# Patient Record
Sex: MALE | Race: WHITE | NOT HISPANIC OR LATINO | Employment: OTHER | URBAN - METROPOLITAN AREA
[De-identification: names, ages, dates, MRNs, and addresses within clinical notes are randomized per-mention and may not be internally consistent; named-entity substitution may affect disease eponyms.]

---

## 2017-01-11 DIAGNOSIS — I10 ESSENTIAL HYPERTENSION WITH GOAL BLOOD PRESSURE LESS THAN 140/90: Primary | ICD-10-CM

## 2017-01-11 NOTE — TELEPHONE ENCOUNTER
amLODIPine (NORVASC) 10 MG tablet      Last Written Prescription Date: 09/02/16  Last Fill Quantity: 90, # refills: 0    Last Office Visit with [facility], [facility] or [facility] prescribing provider:  09/02/16   Future Office Visit:        BP Readings from Last 3 Encounters:   09/02/16 160/80   08/09/16 150/76   08/09/16 126/75           [facility]

## 2017-01-11 NOTE — TELEPHONE ENCOUNTER
Routing refill request to provider for review/approval because:  Last 2 BP reading above goal.    Vielka Mcguire RN

## 2017-01-13 RX ORDER — AMLODIPINE BESYLATE 10 MG/1
10 TABLET ORAL DAILY
Qty: 30 TABLET | Refills: 0 | Status: SHIPPED | OUTPATIENT
Start: 2017-01-13 | End: 2017-02-10

## 2017-02-10 ENCOUNTER — OFFICE VISIT (OUTPATIENT)
Dept: FAMILY MEDICINE | Facility: CLINIC | Age: 57
End: 2017-02-10
Payer: COMMERCIAL

## 2017-02-10 VITALS
HEIGHT: 71 IN | OXYGEN SATURATION: 99 % | SYSTOLIC BLOOD PRESSURE: 148 MMHG | BODY MASS INDEX: 29.4 KG/M2 | DIASTOLIC BLOOD PRESSURE: 80 MMHG | TEMPERATURE: 98.8 F | HEART RATE: 75 BPM | WEIGHT: 210 LBS

## 2017-02-10 DIAGNOSIS — E78.5 HYPERLIPIDEMIA LDL GOAL <130: ICD-10-CM

## 2017-02-10 DIAGNOSIS — I10 ESSENTIAL HYPERTENSION WITH GOAL BLOOD PRESSURE LESS THAN 140/90: Primary | ICD-10-CM

## 2017-02-10 PROCEDURE — 99214 OFFICE O/P EST MOD 30 MIN: CPT | Performed by: FAMILY MEDICINE

## 2017-02-10 RX ORDER — METOPROLOL SUCCINATE 50 MG/1
50 TABLET, EXTENDED RELEASE ORAL DAILY
Qty: 90 TABLET | Refills: 0 | Status: SHIPPED | OUTPATIENT
Start: 2017-02-10 | End: 2017-08-03 | Stop reason: ALTCHOICE

## 2017-02-10 RX ORDER — AMLODIPINE BESYLATE 5 MG/1
10 TABLET ORAL DAILY
Qty: 180 TABLET | Refills: 0 | Status: SHIPPED | OUTPATIENT
Start: 2017-02-10 | End: 2017-05-25

## 2017-02-10 RX ORDER — SIMVASTATIN 20 MG
20 TABLET ORAL EVERY EVENING
Qty: 90 TABLET | Refills: 0 | Status: SHIPPED | OUTPATIENT
Start: 2017-02-10 | End: 2017-05-25

## 2017-02-10 RX ORDER — CHLORTHALIDONE 25 MG/1
25 TABLET ORAL DAILY
Qty: 90 TABLET | Refills: 0 | Status: SHIPPED | OUTPATIENT
Start: 2017-02-10 | End: 2017-05-25

## 2017-02-10 ASSESSMENT — PAIN SCALES - GENERAL: PAINLEVEL: NO PAIN (0)

## 2017-02-10 NOTE — NURSING NOTE
"Chief Complaint   Patient presents with     Hypertension     Lipids       Initial /84 mmHg  Pulse 75  Temp(Src) 98.8  F (37.1  C) (Oral)  Ht 5' 11\" (1.803 m)  Wt 210 lb (95.255 kg)  BMI 29.30 kg/m2  SpO2 99% Estimated body mass index is 29.3 kg/(m^2) as calculated from the following:    Height as of this encounter: 5' 11\" (1.803 m).    Weight as of this encounter: 210 lb (95.255 kg).  Medication Reconciliation: complete   YUSUF Burt MA      "

## 2017-02-10 NOTE — PATIENT INSTRUCTIONS
================================================================================  Normal Values   Blood pressure  <140/90 for most adults    <130/80 for some chronic diseases (ask your care team about yours)    BMI (body mass index)  18.5-25 kg/m2 (based on height and weight)     Thank you for visiting [facility]    Normal or non-critical lab and imaging results will be communicated to you by MyChart, letter or phone within 7 days.  If you do not hear from us within 10 days, please call the clinic. If you have a critical or abnormal lab result, we will notify you by phone as soon as possible.     If you have any questions regarding your visit please contact:     Team Comfort:   Clinic Hours Telephone Number   Dr. Chacho Jones 7am-5pm  Monday - Friday [phone]  Azucena Vora RN   Pharmacy 8:00am-8pm Monday-Friday    9am-5pm Saturday-Sunday [phone]   Urgent Care 11am-9pm Monday-Friday        9am-5pm Saturday-Sunday [phone]     After hours, weekend or if you need to make an appointment with your primary provider please call [phone].   After Hours nurse advise: call [place] [facility]: [phone]    Medication Refills:  Call your pharmacy and they will forward the refill to us. Please allow 3 business days for your refills to be completed.

## 2017-02-10 NOTE — MR AVS SNAPSHOT
After Visit Summary   2/10/2017    Erik Youngblood    MRN: [id number]           Patient Information     Date Of Birth          1960        Visit Information        Provider Department      2/10/2017 10:00 AM Chacho Garcia MD [facility]        Today's Diagnoses     Essential hypertension with goal blood pressure less than 140/90    -  1     Hyperlipidemia LDL goal <130           Care Instructions        ================================================================================  Normal Values   Blood pressure  <140/90 for most adults    <130/80 for some chronic diseases (ask your care team about yours)    BMI (body mass index)  18.5-25 kg/m2 (based on height and weight)     Thank you for visiting [facility]    Normal or non-critical lab and imaging results will be communicated to you by MyChart, letter or phone within 7 days.  If you do not hear from us within 10 days, please call the clinic. If you have a critical or abnormal lab result, we will notify you by phone as soon as possible.     If you have any questions regarding your visit please contact:     Team Comfort:   Clinic Hours Telephone Number   Dr. Chacho Jones 7am-5pm  Monday - Friday [phone]  Azucena Vora RN   Pharmacy 8:00am-8pm Monday-Friday    9am-5pm Saturday-Sunday [phone]   Urgent Care 11am-9pm Monday-Friday        9am-5pm Saturday-Sunday [phone]     After hours, weekend or if you need to make an appointment with your primary provider please call [phone].   After Hours nurse advise: call [place] [facility]: [phone]    Medication Refills:  Call your pharmacy and they will forward the refill to us. Please allow 3 business days for your refills to be completed.                Follow-ups after your visit        Follow-up notes from your care team     Return in about 4 weeks  "(around 3/7/2017) for blood pressure check (and fasting labs in the near future).      Future tests that were ordered for you today     Open Future Orders        Priority Expected Expires Ordered    Lipid panel reflex to direct LDL Routine 2/11/2017 2/10/2018 2/10/2017    ALT Routine 2/11/2017 2/10/2018 2/10/2017    Potassium Routine 2/11/2017 2/10/2018 2/10/2017    Creatinine Routine 2/11/2017 2/10/2018 2/10/2017            Who to contact     If you have questions or need follow up information about today's clinic visit or your schedule please contact [facility] directly at [phone].  Normal or non-critical lab and imaging results will be communicated to you by Fingooroohart, letter or phone within 4 business days after the clinic has received the results. If you do not hear from us within 7 days, please contact the clinic through Optimal Internet Solutionst or phone. If you have a critical or abnormal lab result, we will notify you by phone as soon as possible.  Submit refill requests through Solairedirect or call your pharmacy and they will forward the refill request to us. Please allow 3 business days for your refill to be completed.          Additional Information About Your Visit        FingoorooharCITTIO Information     Solairedirect gives you secure access to your electronic health record. If you see a primary care provider, you can also send messages to your care team and make appointments. If you have questions, please call your primary care clinic.  If you do not have a primary care provider, please call [phone] and they will assist you.        Care EveryWhere ID     This is your Care EveryWhere ID. This could be used by other organizations to access your [place] medical records  [id number]        Your Vitals Were     Pulse Temperature Height BMI (Body Mass Index) Pulse Oximetry       75 98.8  F (37.1  C) (Oral) 5' 11\" (1.803 m) 29.30 kg/m2 99%        Blood Pressure from Last 3 Encounters:   02/10/17 148/80   09/02/16 " 160/80   08/09/16 150/76    Weight from Last 3 Encounters:   02/10/17 210 lb (95.255 kg)   09/02/16 216 lb 4.8 oz (98.113 kg)   08/09/16 212 lb (96.163 kg)                 Today's Medication Changes          These changes are accurate as of: 2/10/17 10:42 AM.  If you have any questions, ask your nurse or doctor.               Start taking these medicines.        Dose/Directions    metoprolol 50 MG 24 hr tablet   Commonly known as:  TOPROL-XL   Used for:  Essential hypertension with goal blood pressure less than 140/90   Started by:  Chacho Garcia MD        Dose:  50 mg   Take 1 tablet (50 mg) by mouth daily for blood pressure.   Quantity:  90 tablet   Refills:  0         These medicines have changed or have updated prescriptions.        Dose/Directions    amLODIPine 5 MG tablet   Commonly known as:  NORVASC   This may have changed:  medication strength   Used for:  Essential hypertension with goal blood pressure less than 140/90   Changed by:  Chacho Garcia MD        Dose:  10 mg   Take 2 tablets (10 mg) by mouth daily for blood pressure.   Quantity:  180 tablet   Refills:  0       chlorthalidone 25 MG tablet   Commonly known as:  HYGROTON   This may have changed:  See the new instructions.   Used for:  Essential hypertension with goal blood pressure less than 140/90   Changed by:  Chacho Garcia MD        Dose:  25 mg   Take 1 tablet (25 mg) by mouth daily for blood pressure.   Quantity:  90 tablet   Refills:  0       simvastatin 20 MG tablet   Commonly known as:  ZOCOR   This may have changed:  See the new instructions.   Used for:  Hyperlipidemia LDL goal <130   Changed by:  Chacho Garcia MD        Dose:  20 mg   Take 1 tablet (20 mg) by mouth every evening for cholesterol.   Quantity:  90 tablet   Refills:  0            Where to get your medicines      These medications were sent to [facility] [place] - [address]  [address], [place] [address]      Phone:  [phone]    - amLODIPine 5 MG tablet  - chlorthalidone 25 MG tablet  - metoprolol 50 MG 24 hr tablet  - simvastatin 20 MG tablet             Primary Care Provider Office Phone # Fax #    Chacho Garcia [phone]       [facility] [address]  [facility] [id number]        Thank you!     Thank you for choosing [facility]  for your care. Our goal is always to provide you with excellent care. Hearing back from our patients is one way we can continue to improve our services. Please take a few minutes to complete the written survey that you may receive in the mail after your visit with us. Thank you!             Your Updated Medication List - Protect others around you: Learn how to safely use, store and throw away your medicines at www.disposemymeds.org.          This list is accurate as of: 2/10/17 10:42 AM.  Always use your most recent med list.                   Brand Name Dispense Instructions for use    amLODIPine 5 MG tablet    NORVASC    180 tablet    Take 2 tablets (10 mg) by mouth daily for blood pressure.       chlorthalidone 25 MG tablet    HYGROTON    90 tablet    Take 1 tablet (25 mg) by mouth daily for blood pressure.       CITRUCEL Powd   Generic drug:  methylcellulose (laxative)      Take by mouth daily       metoprolol 50 MG 24 hr tablet    TOPROL-XL    90 tablet    Take 1 tablet (50 mg) by mouth daily for blood pressure.       simvastatin 20 MG tablet    ZOCOR    90 tablet    Take 1 tablet (20 mg) by mouth every evening for cholesterol.       VITAMIN D3 PO      Take 2,000 Units by mouth daily

## 2017-02-13 DIAGNOSIS — I10 ESSENTIAL HYPERTENSION WITH GOAL BLOOD PRESSURE LESS THAN 140/90: ICD-10-CM

## 2017-02-13 DIAGNOSIS — E78.5 HYPERLIPIDEMIA LDL GOAL <130: ICD-10-CM

## 2017-02-13 LAB
ALT SERPL W P-5'-P-CCNC: 58 U/L (ref 0–70)
CHOLEST SERPL-MCNC: 115 MG/DL
CREAT SERPL-MCNC: 1.24 MG/DL (ref 0.66–1.25)
GFR SERPL CREATININE-BSD FRML MDRD: 60 ML/MIN/1.7M2
HDLC SERPL-MCNC: 52 MG/DL
LDLC SERPL CALC-MCNC: 44 MG/DL
NONHDLC SERPL-MCNC: 63 MG/DL
POTASSIUM SERPL-SCNC: 3.2 MMOL/L (ref 3.4–5.3)
TRIGL SERPL-MCNC: 94 MG/DL

## 2017-02-13 PROCEDURE — 82565 ASSAY OF CREATININE: CPT | Performed by: FAMILY MEDICINE

## 2017-02-13 PROCEDURE — 36415 COLL VENOUS BLD VENIPUNCTURE: CPT | Performed by: FAMILY MEDICINE

## 2017-02-13 PROCEDURE — 84460 ALANINE AMINO (ALT) (SGPT): CPT | Performed by: FAMILY MEDICINE

## 2017-02-13 PROCEDURE — 84132 ASSAY OF SERUM POTASSIUM: CPT | Performed by: FAMILY MEDICINE

## 2017-02-13 PROCEDURE — 80061 LIPID PANEL: CPT | Performed by: FAMILY MEDICINE

## 2017-02-14 RX ORDER — AMLODIPINE BESYLATE 10 MG/1
TABLET ORAL
Qty: 30 TABLET | Refills: 0 | OUTPATIENT
Start: 2017-02-14

## 2017-02-14 NOTE — TELEPHONE ENCOUNTER
Medication is being denied due to: RX was sent to [facility] in [place] on 02/10/2017 for #180 with 0 refills.  No further refills should be needed at this time.   Charline Rodríguez RN

## 2017-05-17 ENCOUNTER — TRANSFERRED RECORDS (OUTPATIENT)
Dept: HEALTH INFORMATION MANAGEMENT | Facility: CLINIC | Age: 57
End: 2017-05-17

## 2017-05-25 DIAGNOSIS — I10 ESSENTIAL HYPERTENSION WITH GOAL BLOOD PRESSURE LESS THAN 140/90: ICD-10-CM

## 2017-05-25 DIAGNOSIS — E78.5 HYPERLIPIDEMIA LDL GOAL <130: ICD-10-CM

## 2017-05-25 NOTE — TELEPHONE ENCOUNTER
simvastatin (ZOCOR) 20 MG tablet     Last Written Prescription Date: 02/10/17  Last Fill Quantity: 90, # refills: 0  Last Office Visit with [facility] or [facility] prescribing provider: 02/10/17       Lab Results   Component Value Date    CHOL 115 02/13/2017     Lab Results   Component Value Date    HDL 52 02/13/2017     Lab Results   Component Value Date    LDL 44 02/13/2017     Lab Results   Component Value Date    TRIG 94 02/13/2017     No results found for: CHOLJHONLRATIO          amLODIPine (NORVASC) 5 MG tablet      Last Written Prescription Date: 02/10/17  Last Fill Quantity: 90, # refills: 0    Last Office Visit with [facility] or [facility] prescribing provider:  02/10/17   Future Office Visit:        BP Readings from Last 3 Encounters:   02/10/17 148/80   09/02/16 160/80   08/09/16 150/76       chlorthalidone (HYGROTON) 25 MG tablet      Last Written Prescription Date: 02/10/17  Last Fill Quantity: 90, # refills: 0  Last Office Visit with [facility] or [facility] prescribing provider: 02/10/17       Potassium   Date Value Ref Range Status   02/13/2017 3.2 (L) 3.4 - 5.3 mmol/L Final     Creatinine   Date Value Ref Range Status   02/13/2017 1.24 0.66 - 1.25 mg/dL Final     BP Readings from Last 3 Encounters:   02/10/17 148/80   09/02/16 160/80   08/09/16 150/76           Laura Hernandez  [facility]

## 2017-05-30 RX ORDER — SIMVASTATIN 20 MG
TABLET ORAL
Qty: 90 TABLET | Refills: 1 | Status: SHIPPED | OUTPATIENT
Start: 2017-05-30 | End: 2017-08-03

## 2017-05-30 RX ORDER — AMLODIPINE BESYLATE 5 MG/1
TABLET ORAL
Qty: 180 TABLET | Refills: 0 | Status: SHIPPED | OUTPATIENT
Start: 2017-05-30 | End: 2017-08-03

## 2017-05-30 NOTE — TELEPHONE ENCOUNTER
Simvastatin and Amlodipine - Prescription approved per [facility] Refill Protocol.    Chlorthalidone - Routing refill request to provider for review/approval because:  Labs out of range:  Potassium    Charline Rodríguez RN

## 2017-06-01 RX ORDER — CHLORTHALIDONE 25 MG/1
TABLET ORAL
Qty: 30 TABLET | Refills: 0 | Status: SHIPPED | OUTPATIENT
Start: 2017-06-01 | End: 2017-06-29

## 2017-06-01 NOTE — TELEPHONE ENCOUNTER
Called pt is notified of refill and lab appt is scheduled.  Julio C Rabago,  For [facility] Comfort [facility]

## 2017-06-08 DIAGNOSIS — I10 ESSENTIAL HYPERTENSION WITH GOAL BLOOD PRESSURE LESS THAN 140/90: ICD-10-CM

## 2017-06-08 LAB — POTASSIUM SERPL-SCNC: 3.1 MMOL/L (ref 3.4–5.3)

## 2017-06-08 PROCEDURE — 84132 ASSAY OF SERUM POTASSIUM: CPT | Performed by: FAMILY MEDICINE

## 2017-06-08 PROCEDURE — 36415 COLL VENOUS BLD VENIPUNCTURE: CPT | Performed by: FAMILY MEDICINE

## 2017-06-29 ENCOUNTER — TELEPHONE (OUTPATIENT)
Dept: FAMILY MEDICINE | Facility: CLINIC | Age: 57
End: 2017-06-29

## 2017-06-29 DIAGNOSIS — I10 ESSENTIAL HYPERTENSION WITH GOAL BLOOD PRESSURE LESS THAN 140/90: ICD-10-CM

## 2017-07-03 NOTE — TELEPHONE ENCOUNTER
Reason for Call:  Other prescription    Detailed comments: only 30 pills for Hygroton but 90 for the others - please call back maybe you dont want me to continue on them?    Phone Number Patient can be reached at: Home number on file [phone] (home)    Best Time: any    Can we leave a detailed message on this number? YES    Call taken on 7/3/2017 at 11:13 AM by Radha Quiroga

## 2017-07-06 RX ORDER — CHLORTHALIDONE 25 MG/1
TABLET ORAL
Qty: 90 TABLET | Refills: 0 | Status: SHIPPED | OUTPATIENT
Start: 2017-07-06 | End: 2017-08-03

## 2017-07-06 NOTE — TELEPHONE ENCOUNTER
Routing refill request to provider for review/approval because:  Potassium is abnormal; provider to advise. Patient would like 90 day.    Shakila Meredith RN, [facility] Triage

## 2017-07-06 NOTE — TELEPHONE ENCOUNTER
Pt is out of medication, is this being approved or denied?  Thank you very kindly!  [facility]aela [facility] [place]

## 2017-07-11 RX ORDER — POTASSIUM CHLORIDE 750 MG/1
10 TABLET, EXTENDED RELEASE ORAL DAILY
Qty: 90 TABLET | Refills: 1 | Status: SHIPPED | OUTPATIENT
Start: 2017-07-11 | End: 2017-12-21

## 2017-07-11 NOTE — TELEPHONE ENCOUNTER
Please advise the patient that I recommend he takes a potassium supplement. I sent a prescription for that. His potassium is probably low because of his chlorthalidone, which is otherwise a very good blood pressure medication. We can recheck the potassium level at his next hypertension visit, which needs to be scheduled within the next 2-4 weeks.

## 2017-08-03 ENCOUNTER — OFFICE VISIT (OUTPATIENT)
Dept: FAMILY MEDICINE | Facility: CLINIC | Age: 57
End: 2017-08-03
Payer: COMMERCIAL

## 2017-08-03 VITALS
BODY MASS INDEX: 29.01 KG/M2 | DIASTOLIC BLOOD PRESSURE: 82 MMHG | WEIGHT: 208 LBS | TEMPERATURE: 97.9 F | HEART RATE: 54 BPM | OXYGEN SATURATION: 96 % | SYSTOLIC BLOOD PRESSURE: 138 MMHG

## 2017-08-03 DIAGNOSIS — E78.5 HYPERLIPIDEMIA LDL GOAL <130: ICD-10-CM

## 2017-08-03 DIAGNOSIS — I10 ESSENTIAL HYPERTENSION WITH GOAL BLOOD PRESSURE LESS THAN 140/90: ICD-10-CM

## 2017-08-03 LAB
ALT SERPL W P-5'-P-CCNC: 50 U/L (ref 0–70)
ANION GAP SERPL CALCULATED.3IONS-SCNC: 8 MMOL/L (ref 3–14)
BUN SERPL-MCNC: 17 MG/DL (ref 7–30)
CALCIUM SERPL-MCNC: 9.4 MG/DL (ref 8.5–10.1)
CHLORIDE SERPL-SCNC: 98 MMOL/L (ref 94–109)
CHOLEST SERPL-MCNC: 125 MG/DL
CO2 SERPL-SCNC: 31 MMOL/L (ref 20–32)
CREAT SERPL-MCNC: 1.08 MG/DL (ref 0.66–1.25)
GFR SERPL CREATININE-BSD FRML MDRD: 70 ML/MIN/1.7M2
GLUCOSE SERPL-MCNC: 98 MG/DL (ref 70–99)
HDLC SERPL-MCNC: 58 MG/DL
LDLC SERPL CALC-MCNC: 48 MG/DL
NONHDLC SERPL-MCNC: 67 MG/DL
POTASSIUM SERPL-SCNC: 3.4 MMOL/L (ref 3.4–5.3)
SODIUM SERPL-SCNC: 137 MMOL/L (ref 133–144)
TRIGL SERPL-MCNC: 93 MG/DL

## 2017-08-03 PROCEDURE — 99214 OFFICE O/P EST MOD 30 MIN: CPT | Performed by: FAMILY MEDICINE

## 2017-08-03 PROCEDURE — 36415 COLL VENOUS BLD VENIPUNCTURE: CPT | Performed by: FAMILY MEDICINE

## 2017-08-03 PROCEDURE — 84460 ALANINE AMINO (ALT) (SGPT): CPT | Performed by: FAMILY MEDICINE

## 2017-08-03 PROCEDURE — 80061 LIPID PANEL: CPT | Performed by: FAMILY MEDICINE

## 2017-08-03 PROCEDURE — 80048 BASIC METABOLIC PNL TOTAL CA: CPT | Performed by: FAMILY MEDICINE

## 2017-08-03 RX ORDER — SIMVASTATIN 20 MG
TABLET ORAL
Qty: 90 TABLET | Refills: 1 | Status: SHIPPED | OUTPATIENT
Start: 2017-08-03 | End: 2018-03-22

## 2017-08-03 RX ORDER — AMLODIPINE BESYLATE 5 MG/1
10 TABLET ORAL DAILY
Qty: 180 TABLET | Refills: 1 | Status: SHIPPED | OUTPATIENT
Start: 2017-08-03 | End: 2018-03-08

## 2017-08-03 RX ORDER — CHLORTHALIDONE 25 MG/1
TABLET ORAL
Qty: 90 TABLET | Refills: 1 | Status: SHIPPED | OUTPATIENT
Start: 2017-08-03 | End: 2018-03-22

## 2017-08-03 NOTE — NURSING NOTE
"Chief Complaint   Patient presents with     Hypertension       Initial /82 (BP Location: Right arm, Patient Position: Chair, Cuff Size: Adult Large)  Pulse 54  Temp 97.9  F (36.6  C) (Oral)  Wt 208 lb (94.3 kg)  SpO2 96%  BMI 29.01 kg/m2 Estimated body mass index is 29.01 kg/(m^2) as calculated from the following:    Height as of 2/10/17: 5' 11\" (1.803 m).    Weight as of this encounter: 208 lb (94.3 kg).  Medication Reconciliation: complete   An,CMA (AMAA)      "

## 2017-08-03 NOTE — MR AVS SNAPSHOT
After Visit Summary   8/3/2017    Erik Youngblood    MRN: [id number]           Patient Information     Date Of Birth          1960        Visit Information        Provider Department      8/3/2017 9:40 AM Chacho Garcia MD [facility]        Today's Diagnoses     Essential hypertension with goal blood pressure less than 140/90        Hyperlipidemia LDL goal <130          Care Instructions    This summary includes the important diagnoses, test, medications and other important parts of your medical history.  Below are a few good we sites you can use to learn more about these.     Www.[facility] Center[facility].org : Up to date and easily searchable information on multiple topics.  Www.[facility] Center[facility].[email or web address]/[email or web address] : [facility] $4.99 medications  Www.medlineZe-gen.gov : medication info, interactive tutorials, watch real surgeries online  Www.familydoctor.org : good info from the Academy of Family Physicians  Www.Verified Person.Olaworks : good info from the [facility]  Www.cdc.gov : public health info, travel advisories, epidemics (H1N1)  Www.aap.org : children's health info, normal development, vaccinations  Www.health.state.[place].us : [place] dept of heatlh, public health issues in [place], N1N1    Based on your medical history and these are the current health maintenance or preventive care services that you are due for (some may have been done at this visit:)  Health Maintenance Due   Topic Date Due     EYE EXAM Q1 YEAR  01/11/2017     =================================================================================  Normal Values   Blood pressure  <140/90 for most adults    <130/80 for some chronic diseases (ask your care team about yours)    BMI (body mass index)  18.5-25 kg/m2 (based on height and weight)     Thank you for visiting [facility]    Normal or non-critical lab and imaging results will be communicated to you by MyChart, letter or phone within 7 days.  If you do not  hear from us within 10 days, please call the clinic. If you have a critical or abnormal lab result, we will notify you by phone as soon as possible.     If you have any questions regarding your visit please contact:     Team Comfort:   Clinic Hours Telephone Number   Dr. Chacho Jones   7am-5pm  Monday - Friday [phone]     Pharmacy 8:30am-9pm Monday-Friday    9am-5pm Saturday-Sunday [phone]   Urgent Care 11am-9pm Monday-Friday        9am-5pm Saturday-Sunday [phone]     After hours, weekend or if you need to make an appointment with your primary provider please call [phone].   After Hours nurse advise: call [place] [facility]: [phone]    Medication Refills:  Call your pharmacy and they will forward the refill to us. Please allow 3 business days for your refills to be completed.    Use Znaptag (secure email communication and access to your chart) to send your primary care provider a message or make an appointment. Ask someone on your Team how to sign up for Znaptag. To log on to [employer] or for more information in [place] please visit the website at [email or web address].  As of October 8, 2013, all password changes, disabled accounts, or ID changes in Znaptag/MyHealth will be done by our Access Services Department.   If you need help with your account or password, call: [phone]. Clinic staff no longer has the ability to change passwords.             Follow-ups after your visit        Follow-up notes from your care team     Return in about 6 months (around 1/20/2018) for full physical, blood pressure check, lab tests.      Who to contact     If you have questions or need follow up information about today's clinic visit or your schedule please contact [facility] directly at [phone].  Normal or non-critical lab and imaging results will be communicated to you by PredictAdhart, letter or phone  within 4 business days after the clinic has received the results. If you do not hear from us within 7 days, please contact the clinic through VasoNova or phone. If you have a critical or abnormal lab result, we will notify you by phone as soon as possible.  Submit refill requests through VasoNova or call your pharmacy and they will forward the refill request to us. Please allow 3 business days for your refill to be completed.          Additional Information About Your Visit        VasoNova Information     VasoNova gives you secure access to your electronic health record. If you see a primary care provider, you can also send messages to your care team and make appointments. If you have questions, please call your primary care clinic.  If you do not have a primary care provider, please call [phone] and they will assist you.        Care EveryWhere ID     This is your Care EveryWhere ID. This could be used by other organizations to access your [place] medical records  [id number]        Your Vitals Were     Pulse Temperature Pulse Oximetry BMI (Body Mass Index)          54 97.9  F (36.6  C) (Oral) 96% 29.01 kg/m2         Blood Pressure from Last 3 Encounters:   08/03/17 138/82   02/10/17 148/80   09/02/16 160/80    Weight from Last 3 Encounters:   08/03/17 208 lb (94.3 kg)   02/10/17 210 lb (95.3 kg)   09/02/16 216 lb 4.8 oz (98.1 kg)              We Performed the Following     ALT     Basic metabolic panel     Lipid panel reflex to direct LDL          Today's Medication Changes          These changes are accurate as of: 8/3/17 10:30 AM.  If you have any questions, ask your nurse or doctor.               These medicines have changed or have updated prescriptions.        Dose/Directions    amLODIPine 5 MG tablet   Commonly known as:  NORVASC   This may have changed:  See the new instructions.   Used for:  Essential hypertension with goal blood pressure less than 140/90   Changed by:  Chacho Garcia MD        Dose:   10 mg   Take 2 tablets (10 mg) by mouth daily for blood pressure.   Quantity:  180 tablet   Refills:  1       chlorthalidone 25 MG tablet   Commonly known as:  HYGROTON   This may have changed:  See the new instructions.   Used for:  Essential hypertension with goal blood pressure less than 140/90   Changed by:  Chacoh Garcia MD        TAKE ONE TABLET BY MOUTH EVERY DAY FOR BLOOD PRESSURE   Quantity:  90 tablet   Refills:  1       simvastatin 20 MG tablet   Commonly known as:  ZOCOR   This may have changed:  See the new instructions.   Used for:  Hyperlipidemia LDL goal <130   Changed by:  Chacho Garcia MD        TAKE ONE TABLET BY MOUTH EVERY DAY IN THE EVENING FOR CHOLESTEROL   Quantity:  90 tablet   Refills:  1         Stop taking these medicines if you haven't already. Please contact your care team if you have questions.     metoprolol 50 MG 24 hr tablet   Commonly known as:  TOPROL-XL   Stopped by:  Chacho Garcia MD                Where to get your medicines      These medications were sent to [facility] - [place], [place] - [address]  [address], [facility] [address]     Phone:  [phone]     amLODIPine 5 MG tablet    chlorthalidone 25 MG tablet    simvastatin 20 MG tablet                Primary Care Provider Office Phone # Fax #    Chacho Garcia [phone]       [facility] [address]  [facility] [address]        Equal Access to Services     [facility]: Hadii aad ku hadasho Soomaali, waaxda luqadaha, qaybta kaalmada adeegyada, waxay idiin hayaan chana kharash huber . So [facility] [phone].    ATENCIÓN: Si habla español, tiene a morris disposición servicios gratuitos de asistencia lingüística. Llame al [phone].    We comply with applicable federal civil rights laws and [place] laws. We do not discriminate on the basis of race, color, national origin, age, disability sex, sexual orientation or gender  identity.            Thank you!     Thank you for choosing [facility]  for your care. Our goal is always to provide you with excellent care. Hearing back from our patients is one way we can continue to improve our services. Please take a few minutes to complete the written survey that you may receive in the mail after your visit with us. Thank you!             Your Updated Medication List - Protect others around you: Learn how to safely use, store and throw away your medicines at www.disposemymeds.org.          This list is accurate as of: 8/3/17 10:30 AM.  Always use your most recent med list.                   Brand Name Dispense Instructions for use Diagnosis    amLODIPine 5 MG tablet    NORVASC    180 tablet    Take 2 tablets (10 mg) by mouth daily for blood pressure.    Essential hypertension with goal blood pressure less than 140/90       chlorthalidone 25 MG tablet    HYGROTON    90 tablet    TAKE ONE TABLET BY MOUTH EVERY DAY FOR BLOOD PRESSURE    Essential hypertension with goal blood pressure less than 140/90       CITRUCEL Powd   Generic drug:  methylcellulose (laxative)      Take by mouth daily        potassium chloride SA 10 MEQ CR tablet    K-DUR/KLOR-CON M    90 tablet    Take 1 tablet (10 mEq) by mouth daily    Essential hypertension with goal blood pressure less than 140/90       simvastatin 20 MG tablet    ZOCOR    90 tablet    TAKE ONE TABLET BY MOUTH EVERY DAY IN THE EVENING FOR CHOLESTEROL    Hyperlipidemia LDL goal <130       VITAMIN D3 PO      Take 2,000 Units by mouth daily

## 2017-08-03 NOTE — PROGRESS NOTES
SUBJECTIVE:                                                    Erik Youngblood is a 57 year old male who presents to clinic today for the following health issues:      Hypertension Follow-up      Outpatient blood pressures are being checked at home.  Results are 118/78.    Low Salt Diet: low salt      Amount of exercise or physical activity: 4-5 days/week for an average of 30-45 minutes    Problems taking medications regularly: No    Medication side effects: none  Diet: regular (no restrictions)    Past medical, family, and social histories, medications, and allergies are reviewed and updated in Epic.     ROS:  C: NEGATIVE for fever, chills, change in weight  E/M: NEGATIVE for ear, mouth and throat problems  R: NEGATIVE for significant cough or SOB  CV: NEGATIVE for chest pain, palpitations or peripheral edema  ROS otherwise negative    Any history above obtained by the Medical Assistant was reviewed by Dr. Chacho Garcia MD, and edited when necessary.    This document serves as a record of the services and decisions personally performed and made by Dr. Garcia. It was created on his behalf by Lyudmila Berrios, a trained medical scribe. The creation of this document is based the provider's statements to the medical scribe.  Lyudmila Berrios August 3, 2017 10:22 AM     OBJECTIVE:                                                    /82 (BP Location: Right arm, Patient Position: Chair, Cuff Size: Adult Large)  Pulse 54  Temp 97.9  F (36.6  C) (Oral)  Wt 94.3 kg (208 lb)  SpO2 96%  BMI 29.01 kg/m2   Body mass index is 29.01 kg/(m^2).     GENERAL: healthy, alert and no distress  EYES: Eyes grossly normal to inspection, PERRL, EOMI, sclerae white and conjunctivae normal  Heart & CV:  RRR no murmur.  Intact distal pulses, good cap refill.  LUNGS:  CTA B/L, no wheezing or crackles.   MS: no gross musculoskeletal defects noted, no edema  SKIN: no suspicious lesions or rashes  NEURO: Normal strength and tone,  sensory exam grossly normal, mentation intact, oriented times 3 and cranial nerves 2-12 intact  PSYCH: mentation appears normal, affect normal/bright     Diagnostic Test Results:  none      ASSESSMENT/PLAN:                                                      (I10) Essential hypertension with goal blood pressure less than 140/90  Comment: Well controlled.  Plan: amLODIPine (NORVASC) 5 MG tablet,         chlorthalidone (HYGROTON) 25 MG tablet, Basic         metabolic panel        Follow up in 6 months at [facility].    (E78.5) Hyperlipidemia LDL goal <130  Comment: Fasting. Historically at goal.  Plan: simvastatin (ZOCOR) 20 MG tablet, Lipid panel         reflex to direct LDL, ALT        Follow up in 6 months at [facility].      The information in this document, created by the medical scribe for me, accurately reflects the services I personally performed and the decisions made by me. I have reviewed and approved this document for accuracy prior to leaving the patient care area. August 3, 2017 10:22 AM   Chacho Garcia MD

## 2017-08-03 NOTE — PATIENT INSTRUCTIONS
This summary includes the important diagnoses, test, medications and other important parts of your medical history.  Below are a few good we sites you can use to learn more about these.     Www.iHigh.org : Up to date and easily searchable information on multiple topics.  Www.iHigh.org/Pharmacy/c_[email or web address].asp : [facility] $4.99 medications  Www.medlineplus.gov : medication info, interactive tutorials, watch real surgeries online  Www.familydoctor.org : good info from the Academy of Family Physicians  Www.mayoDailyLookinic.com : good info from the [facility]  Www.cdc.gov : public health info, travel advisories, epidemics (H1N1)  Www.aap.org : children's health info, normal development, vaccinations  Www.health.[facility].[place].us : [place] dept of heat, public health issues in [place], N1N1    Based on your medical history and these are the current health maintenance or preventive care services that you are due for (some may have been done at this visit:)  Health Maintenance Due   Topic Date Due     EYE EXAM Q1 YEAR  01/11/2017     =================================================================================  Normal Values   Blood pressure  <140/90 for most adults    <130/80 for some chronic diseases (ask your care team about yours)    BMI (body mass index)  18.5-25 kg/m2 (based on height and weight)     Thank you for visiting [facility]    Normal or non-critical lab and imaging results will be communicated to you by MyChart, letter or phone within 7 days.  If you do not hear from us within 10 days, please call the clinic. If you have a critical or abnormal lab result, we will notify you by phone as soon as possible.     If you have any questions regarding your visit please contact:     Team Comfort:   Clinic Hours Telephone Number   Dr. Chacho Jones   7am-5pm  Monday - Friday [phone]     Pharmacy 8:30am-9pm Monday-Friday     9am-5pm Saturday-Sunday [phone]   Urgent Care 11am-9pm Monday-Friday        9am-5pm Saturday-Sunday [phone]     After hours, weekend or if you need to make an appointment with your primary provider please call [phone].   After Hours nurse advise: call [facility]: [phone]    Medication Refills:  Call your pharmacy and they will forward the refill to us. Please allow 3 business days for your refills to be completed.    Use [employer] (secure email communication and access to your chart) to send your primary care provider a message or make an appointment. Ask someone on your Team how to sign up for [employer]. To log on to [facility] or for more information in [employer] please visit the website at [email or web address].  As of October 8, 2013, all password changes, disabled accounts, or ID changes in [employer]/MyHealth will be done by our Access Services Department.   If you need help with your account or password, call: [phone]. Clinic staff no longer has the ability to change passwords.

## 2017-12-21 DIAGNOSIS — I10 ESSENTIAL HYPERTENSION WITH GOAL BLOOD PRESSURE LESS THAN 140/90: ICD-10-CM

## 2017-12-21 NOTE — TELEPHONE ENCOUNTER
Requested Prescriptions   Pending Prescriptions Disp Refills     potassium chloride (K-TAB,KLOR-CON) 10 MEQ tablet [Pharmacy Med Name: POTASSIUM CHLORIDE ER 10MEQ TBCR]  Last Written Prescription Date:  07/11/17  Last Fill Quantity: 90,  # refills: 1   Last Office Visit with [facility], P or [facility] prescribing provider:  08/03/17   Future Office Visit:    90 tablet 1     Sig: TAKE ONE TABLET BY MOUTH EVERY DAY    Potassium Supplements Protocol Passed    12/21/2017  8:09 AM       Passed - Recent or future visit with authorizing provider's specialty    Patient had office visit in the last year or has a visit in the next 30 days with authorizing provider.  See chart review.              Passed - Patient is age 18 or older       Passed - Normal serum potassium in past 12 months    Recent Labs   Lab Test  08/03/17   1040   POTASSIUM  3.4

## 2017-12-26 RX ORDER — POTASSIUM CHLORIDE 750 MG/1
TABLET, EXTENDED RELEASE ORAL
Qty: 90 TABLET | Refills: 0 | Status: SHIPPED | OUTPATIENT
Start: 2017-12-26 | End: 2018-03-22

## 2017-12-27 NOTE — TELEPHONE ENCOUNTER
Prescription approved per [facility] Refill Protocol.  Maggy Dalton RN

## 2018-03-08 DIAGNOSIS — I10 ESSENTIAL HYPERTENSION WITH GOAL BLOOD PRESSURE LESS THAN 140/90: ICD-10-CM

## 2018-03-08 NOTE — TELEPHONE ENCOUNTER
"Requested Prescriptions   Pending Prescriptions Disp Refills     amLODIPine (NORVASC) 5 MG tablet [Pharmacy Med Name: AMLODIPINE BESYLATE 5MG TABS]  Last Written Prescription Date:  08/03/17  Last Fill Quantity: 180,  # refills: 1   Last Office Visit with [facility], [facility] or [facility] prescribing provider:  08/03/17   Future Office Visit:    Next 5 appointments (look out 90 days)     Mar 22, 2018  9:40 AM CDT   Office Visit with Chacho Garcia MD   [facility]    [address] [place] [id number]   [phone]                180 tablet 1     Sig: TAKE TWO TABLETS BY MOUTH EVERY DAY FOR BLOOD PRESSURE    Calcium Channel Blockers Protocol  Passed    3/8/2018 11:08 AM       Passed - Blood pressure under 140/90 in past 12 months    BP Readings from Last 3 Encounters:   08/03/17 138/82   02/10/17 148/80   09/02/16 160/80                Passed - Recent (12 mo) or future (30 days) visit within the authorizing provider's specialty    Patient had office visit in the last year or has a visit in the next 30 days with authorizing provider.  See \"Patient Info\" tab in inbasket, or \"Choose Columns\" in Meds & Orders section of the refill encounter.            Passed - Patient is age 18 or older       Passed - Normal serum creatinine on file in past 12 months    Recent Labs   Lab Test  08/03/17   1040   CR  1.08               "

## 2018-03-09 RX ORDER — AMLODIPINE BESYLATE 5 MG/1
TABLET ORAL
Qty: 60 TABLET | Refills: 0 | Status: SHIPPED | OUTPATIENT
Start: 2018-03-09 | End: 2018-03-22

## 2018-03-09 NOTE — TELEPHONE ENCOUNTER
"Requested Prescriptions   Pending Prescriptions Disp Refills     amLODIPine (NORVASC) 5 MG tablet [Pharmacy Med Name: AMLODIPINE BESYLATE 5MG TABS] 60 tablet 0     Sig: TAKE TWO TABLETS BY MOUTH EVERY DAY FOR BLOOD PRESSURE    Calcium Channel Blockers Protocol  Passed    3/9/2018 12:02 PM       Passed - Blood pressure under 140/90 in past 12 months    BP Readings from Last 3 Encounters:   08/03/17 138/82   02/10/17 148/80   09/02/16 160/80                Passed - Recent (12 mo) or future (30 days) visit within the authorizing provider's specialty    Patient had office visit in the last year or has a visit in the next 30 days with authorizing provider.  See \"Patient Info\" tab in inbasket, or \"Choose Columns\" in Meds & Orders section of the refill encounter.            Passed - Patient is age 18 or older       Passed - Normal serum creatinine on file in past 12 months    Recent Labs   Lab Test  08/03/17   1040   CR  1.08             Medication is being filled for 1 time refill only due to:  Patient needs to be seen because due for yearly physical. Scheduled..     Next 5 appointments (look out 90 days)     Mar 22, 2018  9:40 AM CDT   [facility] MD   [facility]    [address] [place] [id number] [phone]                  Triny Nye RN, BSN      "

## 2018-03-22 ENCOUNTER — OFFICE VISIT (OUTPATIENT)
Dept: FAMILY MEDICINE | Facility: CLINIC | Age: 58
End: 2018-03-22
Payer: COMMERCIAL

## 2018-03-22 VITALS
TEMPERATURE: 97.6 F | SYSTOLIC BLOOD PRESSURE: 138 MMHG | HEIGHT: 71 IN | WEIGHT: 206 LBS | BODY MASS INDEX: 28.84 KG/M2 | HEART RATE: 62 BPM | OXYGEN SATURATION: 99 % | DIASTOLIC BLOOD PRESSURE: 78 MMHG

## 2018-03-22 DIAGNOSIS — I10 ESSENTIAL HYPERTENSION WITH GOAL BLOOD PRESSURE LESS THAN 140/90: Primary | ICD-10-CM

## 2018-03-22 DIAGNOSIS — E78.5 HYPERLIPIDEMIA LDL GOAL <130: ICD-10-CM

## 2018-03-22 LAB
ALT SERPL W P-5'-P-CCNC: 51 U/L (ref 0–70)
CHOLEST SERPL-MCNC: 134 MG/DL
CREAT SERPL-MCNC: 1.09 MG/DL (ref 0.66–1.25)
GFR SERPL CREATININE-BSD FRML MDRD: 69 ML/MIN/1.7M2
HDLC SERPL-MCNC: 58 MG/DL
LDLC SERPL CALC-MCNC: 57 MG/DL
NONHDLC SERPL-MCNC: 76 MG/DL
POTASSIUM SERPL-SCNC: 3 MMOL/L (ref 3.4–5.3)
TRIGL SERPL-MCNC: 96 MG/DL

## 2018-03-22 PROCEDURE — 36415 COLL VENOUS BLD VENIPUNCTURE: CPT | Performed by: FAMILY MEDICINE

## 2018-03-22 PROCEDURE — 99214 OFFICE O/P EST MOD 30 MIN: CPT | Performed by: FAMILY MEDICINE

## 2018-03-22 PROCEDURE — 84132 ASSAY OF SERUM POTASSIUM: CPT | Performed by: FAMILY MEDICINE

## 2018-03-22 PROCEDURE — 82565 ASSAY OF CREATININE: CPT | Performed by: FAMILY MEDICINE

## 2018-03-22 PROCEDURE — 84460 ALANINE AMINO (ALT) (SGPT): CPT | Performed by: FAMILY MEDICINE

## 2018-03-22 PROCEDURE — 80061 LIPID PANEL: CPT | Performed by: FAMILY MEDICINE

## 2018-03-22 RX ORDER — CHLORTHALIDONE 25 MG/1
25 TABLET ORAL DAILY
Qty: 90 TABLET | Refills: 1 | Status: SHIPPED | OUTPATIENT
Start: 2018-03-22 | End: 2018-04-12 | Stop reason: ALTCHOICE

## 2018-03-22 RX ORDER — SIMVASTATIN 20 MG
20 TABLET ORAL EVERY EVENING
Qty: 90 TABLET | Refills: 1 | Status: SHIPPED | OUTPATIENT
Start: 2018-03-22 | End: 2018-10-18

## 2018-03-22 RX ORDER — AMLODIPINE BESYLATE 5 MG/1
10 TABLET ORAL DAILY
Qty: 180 TABLET | Refills: 1 | Status: SHIPPED | OUTPATIENT
Start: 2018-03-22 | End: 2018-09-29

## 2018-03-22 RX ORDER — POTASSIUM CHLORIDE 750 MG/1
10 TABLET, EXTENDED RELEASE ORAL DAILY
Qty: 90 TABLET | Refills: 1 | Status: SHIPPED | OUTPATIENT
Start: 2018-03-22 | End: 2018-06-29

## 2018-03-22 ASSESSMENT — PAIN SCALES - GENERAL: PAINLEVEL: NO PAIN (0)

## 2018-03-22 NOTE — PATIENT INSTRUCTIONS
At [facility], we strive to deliver an exceptional experience to you, every time we see you.  If you receive a survey in the mail, please send us back your thoughts. We really do value your feedback.    Based on your medical history, these are the current health maintenance/preventive care services that you are due for (some may have been done at this visit.)  Health Maintenance Due   Topic Date Due     EYE EXAM Q1 YEAR  01/11/2017         Suggested websites for health information:  Www.[facility].org : Up to date and easily searchable information on multiple topics.  Www.medlineplus.gov : medication info, interactive tutorials, watch real surgeries online  Www.familydoctor.org : good info from the Academy of Family Physicians  Www.cdc.gov : public health info, travel advisories, epidemics (H1N1)  Www.aap.org : children's health info, normal development, vaccinations  Www.health.[facility].[place].us : [place] dept of health, public health issues in [place], N1N1    Your care team:                            Family Medicine Internal Medicine   MD Waylon Reyes MD Shantel Branch-Fleming, MD Katya Georgiev PA-C Megan Hill, APRN CNP    Dimitrios Andino MD Pediatrics   Austin Stephens, PAAsaelC  Jesi Newby, CNP Mirela Zamudio APRN CNP   MD Amanda Baron MD Deborah Mielke, MD Kim Thein, APRN CNP      Clinic hours: Monday - Thursday 7 am-7 pm; Fridays 7 am-5 pm.   Urgent care: Monday - Friday 11 am-9 pm; Saturday and Sunday 9 am-5 pm.  Pharmacy : Monday -Thursday 8 am-8 pm; Friday 8 am-6 pm; Saturday and Sunday 9 am-5 pm.     Clinic: [phone]   Pharmacy: [phone]

## 2018-03-22 NOTE — MR AVS SNAPSHOT
After Visit Summary   3/22/2018    Erik Youngblood    MRN: [id number]           Patient Information     Date Of Birth          1960        Visit Information        Provider Department      3/22/2018 1:40 PM Chacho Garcia MD [facility]        Today's Diagnoses     Essential hypertension with goal blood pressure less than 140/90    -  1    Hyperlipidemia LDL goal <130          Care Instructions    At [facility], we strive to deliver an exceptional experience to you, every time we see you.  If you receive a survey in the mail, please send us back your thoughts. We really do value your feedback.    Based on your medical history, these are the current health maintenance/preventive care services that you are due for (some may have been done at this visit.)  Health Maintenance Due   Topic Date Due     EYE EXAM Q1 YEAR  01/11/2017         Suggested websites for health information:  WwwStepOne : Up to date and easily searchable information on multiple topics.  Www.[employer].gov : medication info, interactive tutorials, watch real surgeries online  Www.familydoctor.org : good info from the Academy of Family Physicians  Www.cdc.gov : public health info, travel advisories, epidemics (H1N1)  Www.aap.org : children's health info, normal development, vaccinations  Www.health.[facility].[place].us : [place] dept of health, public health issues in [place], N1N1    Your care team:                            Family Medicine Internal Medicine   MD Waylon Reyes MD Shantel Branch-Fleming, MD Katya Georgiev PA-C Megan Hill, IRMA Andino MD Pediatrics   COLIN Vu, NGUYEN Zamudio APRN CNP   MD Amanda Baron MD Deborah Mielke, MD Kim Thein, APRN CNP      Clinic hours: Monday - Thursday 7 am-7 pm; Fridays 7 am-5 pm.   Urgent care: Monday - Friday 11 am-9 pm; Saturday and Sunday 9 am-5 pm.  Pharmacy : Monday  "-Thursday 8 am-8 pm; Friday 8 am-6 pm; Saturday and Sunday 9 am-5 pm.     Clinic: [phone]   Pharmacy: [phone]              Follow-ups after your visit        Follow-up notes from your care team     Return in about 6 months (around 9/22/2018) for full physical, cholesterol, blood pressure check.      Who to contact     If you have questions or need follow up information about today's clinic visit or your schedule please contact [facility] directly at [phone].  Normal or non-critical lab and imaging results will be communicated to you by [employer], letter or phone within 4 business days after the clinic has received the results. If you do not hear from us within 7 days, please contact the clinic through [employer] or phone. If you have a critical or abnormal lab result, we will notify you by phone as soon as possible.  Submit refill requests through [employer] or call your pharmacy and they will forward the refill request to us. Please allow 3 business days for your refill to be completed.          Additional Information About Your Visit        MyChart Information     [employer] gives you secure access to your electronic health record. If you see a primary care provider, you can also send messages to your care team and make appointments. If you have questions, please call your primary care clinic.  If you do not have a primary care provider, please call [phone] and they will assist you.        Care EveryWhere ID     This is your Care EveryWhere ID. This could be used by other organizations to access your [facility] medical records  [id number]        Your Vitals Were     Pulse Temperature Height Pulse Oximetry BMI (Body Mass Index)       62 97.6  F (36.4  C) (Oral) 5' 11\" (1.803 m) 99% 28.73 kg/m2        Blood Pressure from Last 3 Encounters:   03/22/18 138/78   08/03/17 138/82   02/10/17 148/80    Weight from Last 3 Encounters:   03/22/18 206 lb (93.4 kg)   08/03/17 208 lb (94.3 kg) "   02/10/17 210 lb (95.3 kg)              We Performed the Following     ALT     Creatinine     Lipid panel reflex to direct LDL Non-fasting     Potassium          Today's Medication Changes          These changes are accurate as of 3/22/18  2:15 PM.  If you have any questions, ask your nurse or doctor.               These medicines have changed or have updated prescriptions.        Dose/Directions    amLODIPine 5 MG tablet   Commonly known as:  NORVASC   This may have changed:  See the new instructions.   Used for:  Essential hypertension with goal blood pressure less than 140/90   Changed by:  Chacho Garcia MD        Dose:  10 mg   Take 2 tablets (10 mg) by mouth daily for blood pressure.   Quantity:  180 tablet   Refills:  1       chlorthalidone 25 MG tablet   Commonly known as:  HYGROTON   This may have changed:    - how much to take  - how to take this  - when to take this  - additional instructions   Used for:  Essential hypertension with goal blood pressure less than 140/90   Changed by:  Chacho Garcia MD        Dose:  25 mg   Take 1 tablet (25 mg) by mouth daily for blood pressure.   Quantity:  90 tablet   Refills:  1       potassium chloride 10 MEQ tablet   Commonly known as:  K-TAB,KLOR-CON   This may have changed:  See the new instructions.   Used for:  Essential hypertension with goal blood pressure less than 140/90   Changed by:  Chacho Garcia MD        Dose:  10 mEq   Take 1 tablet (10 mEq) by mouth daily   Quantity:  90 tablet   Refills:  1       simvastatin 20 MG tablet   Commonly known as:  ZOCOR   This may have changed:    - how much to take  - how to take this  - when to take this  - additional instructions   Used for:  Hyperlipidemia LDL goal <130   Changed by:  Chacho Garcia MD        Dose:  20 mg   Take 1 tablet (20 mg) by mouth every evening for cholesterol.   Quantity:  90 tablet   Refills:  1            Where to get your medicines      [place]  [facility] [place] [address]  [address], [facility] [address]     Phone:  [phone]     amLODIPine 5 MG tablet    chlorthalidone 25 MG tablet    potassium chloride 10 MEQ tablet    simvastatin 20 MG tablet                Primary Care Provider Office Phone # Fax #    Chacho Garcia [phone]       [address]  [facility] [address]        Equal Access to Services     ASIYA SPENCE : Hadii aad ku hadasho Soomaali, waaxda luqadaha, qaybta kaalmada adeegyada, waxay idiin hayaan adeeg kharash la'michael . So [facility] [phone].    ATENCIÓN: Si habla español, tiene a morris disposición servicios gratuitos de asistencia lingüística. [facility] [phone].    We comply with applicable federal civil rights laws and [place] laws. We do not discriminate on the basis of race, color, national origin, age, disability, sex, sexual orientation, or gender identity.            Thank you!     Thank you for choosing [facility]  for your care. Our goal is always to provide you with excellent care. Hearing back from our patients is one way we can continue to improve our services. Please take a few minutes to complete the written survey that you may receive in the mail after your visit with us. Thank you!             Your Updated Medication List - Protect others around you: Learn how to safely use, store and throw away your medicines at www.disposemymeds.org.          This list is accurate as of 3/22/18  2:15 PM.  Always use your most recent med list.                   Brand Name Dispense Instructions for use Diagnosis    amLODIPine 5 MG tablet    NORVASC    180 tablet    Take 2 tablets (10 mg) by mouth daily for blood pressure.    Essential hypertension with goal blood pressure less than 140/90       chlorthalidone 25 MG tablet    HYGROTON    90 tablet    Take 1 tablet (25 mg) by mouth daily for blood pressure.    Essential hypertension with  goal blood pressure less than 140/90       CITRUCEL Powd   Generic drug:  methylcellulose (laxative)      Take by mouth daily        potassium chloride 10 MEQ tablet    K-TAB,KLOR-CON    90 tablet    Take 1 tablet (10 mEq) by mouth daily    Essential hypertension with goal blood pressure less than 140/90       simvastatin 20 MG tablet    ZOCOR    90 tablet    Take 1 tablet (20 mg) by mouth every evening for cholesterol.    Hyperlipidemia LDL goal <130       VITAMIN D3 PO      Take 2,000 Units by mouth daily

## 2018-03-22 NOTE — PROGRESS NOTES
"  SUBJECTIVE:   Erik Youngblood is a 57 year old male who presents to clinic today for the following health issues:      Hyperlipidemia Follow-Up      Rate your low fat/cholesterol diet?: poor, went on vacation recently     Taking statin?  Yes, no muscle aches from statin    Other lipid medications/supplements?:  none    Hypertension Follow-up      Outpatient blood pressures are being checked at home.  Results are very good: yesterday morning - 118/73, yesterday afternoon - 118/72; this morning - 122/82    Low Salt Diet: no added salt    He suspects he has a component of white coat hypertension.      Amount of exercise or physical activity: 4-5 days/week for an average of 30-45 minutes    Problems taking medications regularly: No    Medication side effects: none    Diet: regular (no restrictions)      Past medical, family, and social histories, medications, and allergies are reviewed and updated in Epic.     ROS:  CONSTITUTIONAL: NEGATIVE for fever, chills, change in weight  ENT/MOUTH: He notes his left ear has been bothersome since he flew home from [place].  RESP: NEGATIVE for significant cough or SOB  CV: NEGATIVE for chest pain, palpitations or peripheral edema  ROS otherwise negative    This document serves as a record of the services and decisions personally performed and made by Dr. Garcia. It was created on his behalf by Lyudmila Berrios, a trained medical scribe. The creation of this document is based the provider's statements to the medical scribe.  Lyudmila Berrios March 22, 2018 2:09 PM     OBJECTIVE:                                                    /78  Pulse 62  Temp 97.6  F (36.4  C) (Oral)  Ht 1.803 m (5' 11\")  Wt 93.4 kg (206 lb)  SpO2 99%  BMI 28.73 kg/m2   Body mass index is 28.73 kg/(m^2).     GENERAL: healthy, alert and no distress  EYES: Eyes grossly normal to inspection, PERRL, EOMI, sclerae white and conjunctivae normal  HENT: bilateral ear canals and TMs normal  RESP: lungs " clear to auscultation - no crackles or wheezes, no areas of dullness, no tachypnea  CV: Heart regular rate and rhythm without murmur, click or rub. No peripheral edema and peripheral pulses strong  MS: no gross musculoskeletal defects noted, no edema  SKIN: no suspicious lesions or rashes  NEURO: Normal strength and tone, sensory exam grossly normal, mentation intact, oriented times 3 and cranial nerves 2-12 intact  PSYCH: mentation appears normal, affect normal/bright     Diagnostic Test Results:  none      ASSESSMENT/PLAN:                                                      (I10) Essential hypertension with goal blood pressure less than 140/90  (primary encounter diagnosis)  Comment: Well controlled.  Plan: amLODIPine (NORVASC) 5 MG tablet,         chlorthalidone (HYGROTON) 25 MG tablet,         potassium chloride (K-TAB,KLOR-CON) 10 MEQ         tablet, Potassium, Creatinine        Return in about 6 months (around 9/22/2018) for full physical, cholesterol, blood pressure check.     (E78.5) Hyperlipidemia LDL goal <130  Comment: historically at goal. Non-fasting (last ate 6:30am).  Plan: simvastatin (ZOCOR) 20 MG tablet, Lipid panel         reflex to direct LDL Non-fasting, ALT        Follow up in 6 months.       The information in this document, created by the medical scribe for me, accurately reflects the services I personally performed and the decisions made by me. I have reviewed and approved this document for accuracy prior to leaving the patient care area. March 22, 2018 2:09 PM   Chacho Garcia MD

## 2018-04-09 ENCOUNTER — MYC MEDICAL ADVICE (OUTPATIENT)
Dept: FAMILY MEDICINE | Facility: CLINIC | Age: 58
End: 2018-04-09

## 2018-04-09 DIAGNOSIS — I10 ESSENTIAL HYPERTENSION WITH GOAL BLOOD PRESSURE LESS THAN 140/90: ICD-10-CM

## 2018-04-09 LAB — POTASSIUM SERPL-SCNC: 3.2 MMOL/L (ref 3.4–5.3)

## 2018-04-09 PROCEDURE — 36415 COLL VENOUS BLD VENIPUNCTURE: CPT | Performed by: FAMILY MEDICINE

## 2018-04-09 PROCEDURE — 84132 ASSAY OF SERUM POTASSIUM: CPT | Performed by: FAMILY MEDICINE

## 2018-04-10 NOTE — TELEPHONE ENCOUNTER
Labs are complete from yesterday. Also review wanting a change in BP medication. Did send MyChart to obtain additional information on the symptoms patient is having.    Shakila Meredith RN, [facility] Triage

## 2018-04-11 ENCOUNTER — MYC MEDICAL ADVICE (OUTPATIENT)
Dept: FAMILY MEDICINE | Facility: CLINIC | Age: 58
End: 2018-04-11

## 2018-04-11 DIAGNOSIS — I10 ESSENTIAL HYPERTENSION WITH GOAL BLOOD PRESSURE LESS THAN 140/90: Primary | ICD-10-CM

## 2018-04-12 ENCOUNTER — MYC MEDICAL ADVICE (OUTPATIENT)
Dept: FAMILY MEDICINE | Facility: CLINIC | Age: 58
End: 2018-04-12

## 2018-04-12 RX ORDER — HYDROCHLOROTHIAZIDE 25 MG/1
25 TABLET ORAL DAILY
Qty: 90 TABLET | Refills: 0 | Status: SHIPPED | OUTPATIENT
Start: 2018-04-12 | End: 2018-06-27

## 2018-04-23 ENCOUNTER — OFFICE VISIT (OUTPATIENT)
Dept: OPTOMETRY | Facility: CLINIC | Age: 58
End: 2018-04-23
Payer: COMMERCIAL

## 2018-04-23 DIAGNOSIS — H10.13 ALLERGIC CONJUNCTIVITIS OF BOTH EYES: ICD-10-CM

## 2018-04-23 DIAGNOSIS — H52.4 PRESBYOPIA: Primary | ICD-10-CM

## 2018-04-23 DIAGNOSIS — H52.203 ASTIGMATISM OF BOTH EYES, UNSPECIFIED TYPE: ICD-10-CM

## 2018-04-23 PROCEDURE — 92015 DETERMINE REFRACTIVE STATE: CPT | Performed by: OPTOMETRIST

## 2018-04-23 PROCEDURE — 92014 COMPRE OPH EXAM EST PT 1/>: CPT | Performed by: OPTOMETRIST

## 2018-04-23 RX ORDER — AZELASTINE HYDROCHLORIDE 0.5 MG/ML
1 SOLUTION/ DROPS OPHTHALMIC 2 TIMES DAILY
Qty: 1 BOTTLE | Refills: 11 | Status: SHIPPED | OUTPATIENT
Start: 2018-04-23 | End: 2018-10-18

## 2018-04-23 ASSESSMENT — TONOMETRY
OS_IOP_MMHG: 18
IOP_METHOD: TONOPEN
OD_IOP_MMHG: 20

## 2018-04-23 ASSESSMENT — REFRACTION_MANIFEST
OD_SPHERE: PLANO
OD_AXIS: 178
OS_ADD: +2.25
OS_AXIS: 002
OS_SPHERE: -0.75
OD_ADD: +2.25
OS_CYLINDER: +1.25
OD_CYLINDER: +0.75

## 2018-04-23 ASSESSMENT — REFRACTION_WEARINGRX
OD_ADD: +2.00
OS_SPHERE: -0.75
OD_AXIS: 178
SPECS_TYPE: OTC READERS
SPECS_TYPE: EXAM
OS_AXIS: 002
OS_CYLINDER: +0.50
OD_SPHERE: PLANO
OD_CYLINDER: +0.75
OS_ADD: +2.00

## 2018-04-23 ASSESSMENT — EXTERNAL EXAM - LEFT EYE: OS_EXAM: NORMAL

## 2018-04-23 ASSESSMENT — VISUAL ACUITY
OD_SC: 20/30
METHOD: SNELLEN - LINEAR
OD_SC: 20/30
OS_SC: 2030
OD_SC+: -2
OS_SC: 20/40

## 2018-04-23 ASSESSMENT — SLIT LAMP EXAM - LIDS
COMMENTS: NORMAL
COMMENTS: NORMAL

## 2018-04-23 ASSESSMENT — EXTERNAL EXAM - RIGHT EYE: OD_EXAM: NORMAL

## 2018-04-23 ASSESSMENT — CONF VISUAL FIELD
OD_NORMAL: 1
OS_NORMAL: 1

## 2018-04-23 ASSESSMENT — CUP TO DISC RATIO
OS_RATIO: 0.5
OD_RATIO: 0.5

## 2018-04-23 NOTE — PATIENT INSTRUCTIONS
Eyeglass prescription given.    Optivar- 1 drop both eyes 2 x day as needed for itchy eyes.    Return in 1 year for a complete eye exam or sooner if needed.    Adam Aguilera, JEFF    The affects of the dilating drops last for 4- 6 hours.  You will be more sensitive to light and vision will be blurry up close.  Mydriatic sunglasses were given if needed.      Optometry Providers       Clinic Locations                                 Telephone Number   Dr. Laurel Mejia [facility] [place]   [place] [phone]     [facility] Hours:                [place] [facility] Hours:       [facility] Hours:   [address]   [address]      [address] [place] [address]   [place] [address]    [place] [address]  Phone: [phone]                    Phone: [phone]     Phone: [phone]                      Monday 8:00-7:00                          Monday 8:00-7:00                          Monday 8:00-7:00              Tuesday 8:00-6:00                          Tuesday 8:00-7:00                          Tuesday 8:00-7:00              Wednesday 8:00-6:00                  Wednesday 8:00-7:00                   Wednesday 8:00-7:00      Thursday 8:00-6:00                        Thursday 8:00-7:00                         Thursday 8:00-7:00            Friday 8:00-5:00                              Friday 8:00-5:00                              Friday 8:00-5:00    [facility] Hours:   [address] [place] [address]  [phone]    Monday 8:00-7:00  Tuesday 8:00-7:00  Wednesday 8:00-7:00  Thursday 8:00-7:00  Friday 8:00-5:00  Please log on to [email or web address] to order your contact lenses.  The link is found on the [facility] page.  As always, Thank you for trusting us with your health care needs!

## 2018-04-23 NOTE — PROGRESS NOTES
Chief Complaint   Patient presents with     COMPREHENSIVE EYE EXAM         Last Eye Exam: 1-  Dilated Previously: Yes    What are you currently using to see? otc readers       Distance Vision Acuity: Satisfied with vision    Near Vision Acuity: Satisfied with vision while reading  with otc readers    Eye Comfort: good,today itchy and heavy patient was in [facility] last week  Do you use eye drops? 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          Medical, surgical and family histories reviewed and updated 4/23/2018.       OBJECTIVE: See Ophthalmology exam    ASSESSMENT:    ICD-10-CM    1. Presbyopia H52.4 REFRACTION   2. Astigmatism of both eyes, unspecified type H52.203 REFRACTION   3. Allergic conjunctivitis of both eyes H10.13 EYE EXAM (SIMPLE-NONBILLABLE)     azelastine (OPTIVAR) 0.05 % SOLN ophthalmic solution      PLAN:     Patient Instructions   Eyeglass prescription given.    Optivar- 1 drop both eyes 2 x day as needed for itchy eyes.    Return in 1 year for a complete eye exam or sooner if needed.    Adam Aguilera, OD

## 2018-04-23 NOTE — LETTER
4/23/2018         RE: Erik Youngblood  [address]  [facility] [address]        Dear Colleague,    Thank you for referring your patient, Erik Youngblood, to the [facility]. Please see a copy of my visit note below.    Chief Complaint   Patient presents with     COMPREHENSIVE EYE EXAM         Last Eye Exam: 1-  Dilated Previously: Yes    What are you currently using to see? otc readers       Distance Vision Acuity: Satisfied with vision    Near Vision Acuity: Satisfied with vision while reading  with otc readers    Eye Comfort: good,today itchy and heavy patient was in [facility] last week  Do you use eye drops? 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          Medical, surgical and family histories reviewed and updated 4/23/2018.       OBJECTIVE: See Ophthalmology exam    ASSESSMENT:    ICD-10-CM    1. Presbyopia H52.4 REFRACTION   2. Astigmatism of both eyes, unspecified type H52.203 REFRACTION   3. Allergic conjunctivitis of both eyes H10.13 EYE EXAM (SIMPLE-NONBILLABLE)     azelastine (OPTIVAR) 0.05 % SOLN ophthalmic solution      PLAN:     Patient Instructions   Eyeglass prescription given.    Optivar- 1 drop both eyes 2 x day as needed for itchy eyes.    Return in 1 year for a complete eye exam or sooner if needed.    Adam Aguilera, JEFF           Again, thank you for allowing me to participate in the care of your patient.        Sincerely,        Adam Aguilera, OD

## 2018-04-23 NOTE — MR AVS SNAPSHOT
After Visit Summary   4/23/2018    Erik Youngblood    MRN: [id number]           Patient Information     Date Of Birth          1960        Visit Information        Provider Department      4/23/2018 2:40 PM Adam Aguilera OD [facility]        Today's Diagnoses     Presbyopia    -  1    Astigmatism of both eyes, unspecified type        Allergic conjunctivitis of both eyes          Care Instructions    Eyeglass prescription given.    Optivar- 1 drop both eyes 2 x day as needed for itchy eyes.    Return in 1 year for a complete eye exam or sooner if needed.    Adam Aguilera, JEFF    The affects of the dilating drops last for 4- 6 hours.  You will be more sensitive to light and vision will be blurry up close.  Mydriatic sunglasses were given if needed.      Optometry Providers       Clinic Locations                                 Telephone Number   Dr. Laurel Mejia [facility] [place]   [place] [phone]     [facility] Hours:                [facility] Hours:       [facility] Hours:   [address]   [address]     [address] [place] [address]   [place] [address]    [place] [address]  Phone: [phone]                    Phone: [phone]     Phone: [phone]                      Monday 8:00-7:00                          Monday 8:00-7:00                          Monday 8:00-7:00              Tuesday 8:00-6:00                          Tuesday 8:00-7:00                          Tuesday 8:00-7:00              Wednesday 8:00-6:00                  Wednesday 8:00-7:00                   Wednesday 8:00-7:00      Thursday 8:00-6:00                        Thursday 8:00-7:00                         Thursday 8:00-7:00            Friday 8:00-5:00                              Friday 8:00-5:00                               Friday 8:00-5:00    [facility] Hours:   [address] [place] [address]  [phone]    Monday 8:00-7:00  Tuesday 8:00-7:00  Wednesday 8:00-7:00  Thursday 8:00-7:00  Friday 8:00-5:00  Please log on to [email or web address] to order your contact lenses.  The link is found on the Eye Care [facility] Vision Services page.  As always, Thank you for trusting us with your health care needs!              Follow-ups after your visit        Follow-up notes from your care team     Return in about 1 year (around 4/23/2019) for Annual Visit.      Your next 10 appointments already scheduled     Oct 16, 2018  1:00 PM CDT   New Visit with Suzanna Elmore MD   [facility]    [address] [place] [address] [phone]              Who to contact     If you have questions or need follow up information about today's clinic visit or your schedule please contact [facility] directly at [phone].  Normal or non-critical lab and imaging results will be communicated to you by MyChart, letter or phone within 4 business days after the clinic has received the results. If you do not hear from us within 7 days, please contact the clinic through [employer] or phone. If you have a critical or abnormal lab result, we will notify you by phone as soon as possible.  Submit refill requests through [employer] or call your pharmacy and they will forward the refill request to us. Please allow 3 business days for your refill to be completed.          Additional Information About Your Visit        MyChart Information     [employer] gives you secure access to your electronic health record. If you see a primary care provider, you can also send messages to your care team and make appointments. If you have questions, please call your primary care clinic.  If you do not have a primary care provider, please call [phone] and they will assist you.        Care EveryWhere ID      This is your Care EveryWhere ID. This could be used by other organizations to access your [facility] medical records  [id number]         Blood Pressure from Last 3 Encounters:   03/22/18 138/78   08/03/17 138/82   02/10/17 148/80    Weight from Last 3 Encounters:   03/22/18 93.4 kg (206 lb)   08/03/17 94.3 kg (208 lb)   02/10/17 95.3 kg (210 lb)              We Performed the Following     EYE EXAM (SIMPLE-NONBILLABLE)     REFRACTION          Today's Medication Changes          These changes are accurate as of 4/23/18  3:44 PM.  If you have any questions, ask your nurse or doctor.               Start taking these medicines.        Dose/Directions    azelastine 0.05 % Soln ophthalmic solution   Commonly known as:  OPTIVAR   Used for:  Allergic conjunctivitis of both eyes   Started by:  Adam Aguilera, OD        Dose:  1 drop   Apply 1 drop to eye 2 times daily   Quantity:  1 Bottle   Refills:  11            Where to get your medicines      These medications were sent to [facility] - [place], [place] - [address]  [address], [facility] [address]     Phone:  [phone]     azelastine 0.05 % Soln ophthalmic solution                Primary Care Provider Office Phone # Fax #    Chacho Garcia [phone]       [address]  [facility] [address]        Equal Access to Services     ASIYA SPENCE AH: Hadii aad ku hadasho Soomaali, waaxda luqadaha, qaybta kaalmada adeegyada, waxdave anabell scruggs. So [facility] [phone].    ATENCIÓN: Si habla español, tiene a morris disposición servicios gratuitos de asistencia lingüística. Llame al [phone].    We comply with applicable federal civil rights laws and [place] laws. We do not discriminate on the basis of race, color, national origin, age, disability, sex, sexual orientation, or gender identity.            Thank you!     Thank you for choosing [facility]  for your care. Our goal  is always to provide you with excellent care. Hearing back from our patients is one way we can continue to improve our services. Please take a few minutes to complete the written survey that you may receive in the mail after your visit with us. Thank you!             Your Updated Medication List - Protect others around you: Learn how to safely use, store and throw away your medicines at www.disposemymeds.org.          This list is accurate as of 4/23/18  3:44 PM.  Always use your most recent med list.                   Brand Name Dispense Instructions for use Diagnosis    amLODIPine 5 MG tablet    NORVASC    180 tablet    Take 2 tablets (10 mg) by mouth daily for blood pressure.    Essential hypertension with goal blood pressure less than 140/90       azelastine 0.05 % Soln ophthalmic solution    OPTIVAR    1 Bottle    Apply 1 drop to eye 2 times daily    Allergic conjunctivitis of both eyes       CITRUCEL Powd   Generic drug:  methylcellulose (laxative)      Take by mouth daily        hydrochlorothiazide 25 MG tablet    HYDRODIURIL    90 tablet    Take 1 tablet (25 mg) by mouth daily for blood pressure.    Essential hypertension with goal blood pressure less than 140/90       potassium chloride 10 MEQ tablet    K-TAB,KLOR-CON    90 tablet    Take 1 tablet (10 mEq) by mouth daily    Essential hypertension with goal blood pressure less than 140/90       simvastatin 20 MG tablet    ZOCOR    90 tablet    Take 1 tablet (20 mg) by mouth every evening for cholesterol.    Hyperlipidemia LDL goal <130       VITAMIN D3 PO      Take 2,000 Units by mouth daily

## 2018-05-22 ENCOUNTER — TRANSFERRED RECORDS (OUTPATIENT)
Dept: HEALTH INFORMATION MANAGEMENT | Facility: CLINIC | Age: 58
End: 2018-05-22

## 2018-06-27 DIAGNOSIS — I10 ESSENTIAL HYPERTENSION WITH GOAL BLOOD PRESSURE LESS THAN 140/90: ICD-10-CM

## 2018-06-28 NOTE — TELEPHONE ENCOUNTER
"Requested Prescriptions   Pending Prescriptions Disp Refills     hydrochlorothiazide (HYDRODIURIL) 25 MG tablet [Pharmacy Med Name: HYDROCHLOROTHIAZIDE 25MG TABS]  Last Written Prescription Date:  04/12/18  Last Fill Quantity: 90,  # refills: 0   Last Office Visit with G, P or [facility] prescribing provider:  03/22/18   Future Office Visit:    90 tablet 0     Sig: TAKE ONE TABLET BY MOUTH EVERY DAY FOR BLOOD PRESSURE    Diuretics (Including Combos) Protocol Failed    6/27/2018  9:51 PM       Failed - Normal serum potassium on file in past 12 months    Recent Labs   Lab Test  04/09/18   1335   POTASSIUM  3.2*                   Passed - Blood pressure under 140/90 in past 12 months    BP Readings from Last 3 Encounters:   03/22/18 138/78   08/03/17 138/82   02/10/17 148/80                Passed - Recent (12 mo) or future (30 days) visit within the authorizing provider's specialty    Patient had office visit in the last 12 months or has a visit in the next 30 days with authorizing provider or within the authorizing provider's specialty.  See \"Patient Info\" tab in inbasket, or \"Choose Columns\" in Meds & Orders section of the refill encounter.           Passed - Patient is age 18 or older       Passed - Normal serum creatinine on file in past 12 months    Recent Labs   Lab Test  03/22/18   1417   CR  1.09             Passed - Normal serum sodium on file in past 12 months    Recent Labs   Lab Test  08/03/17   1040   NA  137                "

## 2018-06-29 ENCOUNTER — MYC MEDICAL ADVICE (OUTPATIENT)
Dept: FAMILY MEDICINE | Facility: CLINIC | Age: 58
End: 2018-06-29

## 2018-06-29 DIAGNOSIS — I10 ESSENTIAL HYPERTENSION WITH GOAL BLOOD PRESSURE LESS THAN 140/90: ICD-10-CM

## 2018-06-29 RX ORDER — HYDROCHLOROTHIAZIDE 25 MG/1
25 TABLET ORAL DAILY
Qty: 90 TABLET | Refills: 1 | Status: SHIPPED | OUTPATIENT
Start: 2018-06-29 | End: 2018-10-18

## 2018-06-29 RX ORDER — POTASSIUM CHLORIDE 1500 MG/1
20 TABLET, EXTENDED RELEASE ORAL DAILY
Qty: 90 TABLET | Refills: 1 | Status: SHIPPED | OUTPATIENT
Start: 2018-06-29 | End: 2018-08-16 | Stop reason: ALTCHOICE

## 2018-06-29 NOTE — TELEPHONE ENCOUNTER
Routing refill request to provider for review/approval because:  Protocol failed  Nubia Jones RN

## 2018-08-16 RX ORDER — POTASSIUM CHLORIDE 750 MG/1
20 TABLET, EXTENDED RELEASE ORAL DAILY
Qty: 180 TABLET | Refills: 1 | Status: SHIPPED | OUTPATIENT
Start: 2018-08-16 | End: 2018-10-18

## 2018-08-17 ENCOUNTER — TELEPHONE (OUTPATIENT)
Dept: FAMILY MEDICINE | Facility: CLINIC | Age: 58
End: 2018-08-17

## 2018-08-17 NOTE — TELEPHONE ENCOUNTER
Prior Authorization:   need a prior auth to get 2 10meq tablets covered due to patient preference; patient says the 20meq tablets are too large for him to swallow and he doesnt want to split them in half; he has been taking 2 of his 10meq tablets instead hence the refill too soon; called insurance and they said it would have to be a prior auth    Drug: Potassium Chloride ER 10meq    Insurance: [employer]    Phone: [phone]    ID: [id number]    Group:     Albaro Tam  [facility]  Phone: [phone]  Fax: [phone]

## 2018-09-04 NOTE — TELEPHONE ENCOUNTER
Central Prior Authorization Team   Phone: [phone]      PA Initiation    Medication: klor con 10meq  Insurance Company: [employer] - Phone [phone] Fax [phone]  Pharmacy Filling the Rx: [facility] - [place], [place] - [address]  Filling Pharmacy Phone: [phone]  Filling Pharmacy Fax: [phone]  Start Date: 9/4/2018

## 2018-09-12 ENCOUNTER — DOCUMENTATION ONLY (OUTPATIENT)
Dept: LAB | Facility: CLINIC | Age: 58
End: 2018-09-12

## 2018-09-12 DIAGNOSIS — I10 ESSENTIAL HYPERTENSION WITH GOAL BLOOD PRESSURE LESS THAN 140/90: Primary | ICD-10-CM

## 2018-09-12 DIAGNOSIS — I10 ESSENTIAL HYPERTENSION WITH GOAL BLOOD PRESSURE LESS THAN 140/90: ICD-10-CM

## 2018-09-12 PROCEDURE — 84132 ASSAY OF SERUM POTASSIUM: CPT | Performed by: FAMILY MEDICINE

## 2018-09-12 PROCEDURE — 36415 COLL VENOUS BLD VENIPUNCTURE: CPT | Performed by: FAMILY MEDICINE

## 2018-09-12 NOTE — PROGRESS NOTES
Patient has a lab appointment on 0912/2018. Please sign pended orders and place any other future orders that are needed.    Thank you,   Kaylene Quigley

## 2018-09-18 ENCOUNTER — TELEPHONE (OUTPATIENT)
Dept: FAMILY MEDICINE | Facility: CLINIC | Age: 58
End: 2018-09-18

## 2018-09-18 LAB — POTASSIUM SERPL-SCNC: 3.6 MMOL/L (ref 3.4–5.3)

## 2018-09-18 NOTE — TELEPHONE ENCOUNTER
..Reason for Call:  Request for results:    Name of test or procedure: labs    Date of test of procedure: 09-12-18    Location of the test or procedure: [facility] lab    OK to leave the result message on voice mail or with a family member? NO, just patient    Phone number Patient can be reached at:  [phone]    Additional comments: just please call with the results, Thank you    Call taken on 9/18/2018 at 11:01 AM by Radha Rivas

## 2018-09-18 NOTE — TELEPHONE ENCOUNTER
Contacted patient and he states came in as a walk in and did bloodwork on 9/12/18. Future potassium order is placed now for processing. Lab verbally notified and will get it processed.  Estevan Perry CMA

## 2018-09-18 NOTE — TELEPHONE ENCOUNTER
Team please contact patient and ask what results he is looking for. Labs have future order in, but no results since 4/9/18 and there are no imaging results in his chart. Please find out what he is needing.  Aviva Bob RN

## 2018-09-29 DIAGNOSIS — I10 ESSENTIAL HYPERTENSION WITH GOAL BLOOD PRESSURE LESS THAN 140/90: ICD-10-CM

## 2018-09-29 NOTE — TELEPHONE ENCOUNTER
"Requested Prescriptions   Pending Prescriptions Disp Refills     amLODIPine (NORVASC) 5 MG tablet [Pharmacy Med Name: AMLODIPINE BESYLATE 5MG TABS] 180 tablet 1     Sig: TAKE TWO TABLETS BY MOUTH DAILY FOR BLOOD PRESSURE    Calcium Channel Blockers Protocol  Passed    9/29/2018  9:14 AM       Passed - Blood pressure under 140/90 in past 12 months    BP Readings from Last 3 Encounters:   03/22/18 138/78   08/03/17 138/82   02/10/17 148/80                Passed - Recent (12 mo) or future (30 days) visit within the authorizing provider's specialty    Patient had office visit in the last 12 months or has a visit in the next 30 days with authorizing provider or within the authorizing provider's specialty.  See \"Patient Info\" tab in inbasket, or \"Choose Columns\" in Meds & Orders section of the refill encounter.           Passed - Patient is age 18 or older       Passed - Normal serum creatinine on file in past 12 months    Recent Labs   Lab Test  03/22/18   1417   CR  1.09             Last Written Prescription Date:  3/22/18  Last Fill Quantity: 180,  # refills: 1   Last office visit: 3/22/2018 with prescribing provider:     Future Office Visit:      "

## 2018-10-01 RX ORDER — AMLODIPINE BESYLATE 5 MG/1
TABLET ORAL
Qty: 60 TABLET | Refills: 0 | Status: SHIPPED | OUTPATIENT
Start: 2018-10-01 | End: 2018-10-18

## 2018-10-01 NOTE — TELEPHONE ENCOUNTER
Medication is being filled for 1 time refill only due to:  Patient needs to be seen because physical and HTN.     Shakila Meredith RN, [facility]

## 2018-10-03 NOTE — TELEPHONE ENCOUNTER
Sent [employer] message for patient to schedule an appointment for further refills.  Julio C Rabago,  [facility]

## 2018-10-05 NOTE — TELEPHONE ENCOUNTER
Patient has scheduled an appointment. See appointment  History for further details.  Julio C Rabago,  [facility]

## 2018-10-16 ENCOUNTER — OFFICE VISIT (OUTPATIENT)
Dept: DERMATOLOGY | Facility: CLINIC | Age: 58
End: 2018-10-16
Payer: COMMERCIAL

## 2018-10-16 DIAGNOSIS — L82.1 SEBORRHEIC KERATOSIS: ICD-10-CM

## 2018-10-16 DIAGNOSIS — D48.5 NEOPLASM OF UNCERTAIN BEHAVIOR OF SKIN: Primary | ICD-10-CM

## 2018-10-16 DIAGNOSIS — L81.4 SOLAR LENTIGINOSIS: ICD-10-CM

## 2018-10-16 PROCEDURE — 99202 OFFICE O/P NEW SF 15 MIN: CPT | Mod: 25 | Performed by: DERMATOLOGY

## 2018-10-16 PROCEDURE — 11100 HC BIOPSY SKIN/SUBQ/MUC MEM, SINGLE LESION: CPT | Performed by: DERMATOLOGY

## 2018-10-16 PROCEDURE — 88342 IMHCHEM/IMCYTCHM 1ST ANTB: CPT | Mod: TC | Performed by: DERMATOLOGY

## 2018-10-16 PROCEDURE — 88305 TISSUE EXAM BY PATHOLOGIST: CPT | Mod: TC | Performed by: DERMATOLOGY

## 2018-10-16 NOTE — LETTER
10/16/2018         RE: Erik Youngblood  [address]  [facility] [address]        Dear Colleague,    Thank you for referring your patient, Erik Youngblood, to the [facility]. Please see a copy of my visit note below.    [facility] Dermatology Note      Dermatology Problem List:  1Lamin NUB s/p bx 10/16/18    Encounter Date: Oct 16, 2018    CC:  Chief Complaint   Patient presents with     Derm Problem     waist up skin check spots on face          History of Present Illness:  Mr. Erik Youngblood is a 58 year old male who presents for a waist up skin check. He was referred to this clinic by his wife. 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 He would like a lesion on his left cheek to be examined due to concern of hitting it while shaving. The patient voices no other concerns.     Past Medical History:   Patient Active Problem List   Diagnosis     Overweight (BMI 25.0-29.9)     Essential hypertension with goal blood pressure less than 140/90     Hyperlipidemia LDL goal <130     Prostate cancer (H)     Status post prostatectomy     History of adenomatous polyp of colon     Advanced directives, counseling/discussion     Advance care planning     Past Medical History:   Diagnosis Date     Adenomatous colon polyp      Allergic conjunctivitis      Cancer (H) 12/22/14    Prostate Cancer / Removed     Complication of anesthesia     nausea     Hypercholesterolemia      Hypertension      Obesity, Class I, BMI 30-34.9 5/19/2016     Spinal stenosis      Past Surgical History:   Procedure Laterality Date     APPENDECTOMY       COLONOSCOPY  8/9/2016    All was good     COLONOSCOPY WITH CO2 INSUFFLATION N/A 8/9/2016    Procedure: COLONOSCOPY WITH CO2 INSUFFLATION;  Surgeon: Evan Brown MD;  Location: [facility]     LAPAROSCOPIC PROSTATECTOMY, LYMPHADENECTOMY, COMBINED  3/20/15    robot assisted, Dr. Anton Alonso     REPAIR PTOSIS       REPAIR  PTOSIS BILATERAL  11/23/2012    Procedure: REPAIR PTOSIS BILATERAL;  BILATERAL UPPER LID MECHANICAL PTOSIS REPAIR AND BILATERAL BROWN PTOSIS;  Surgeon: Lowell Wood MD;  Location: [facility]     REPAIR PTOSIS BROW BILATERAL  11/23/2012    Procedure: REPAIR PTOSIS BROW BILATERAL;;  Surgeon: Lowell Wood MD;  Location: [facility]     VASECTOMY         Social History:   reports that he has never smoked. He has never used smokeless tobacco. He reports that he drinks alcohol. He reports that he does not use illicit drugs.    Family History:  Family History   Problem Relation Age of Onset     Hypertension Mother      Diabetes Mother      Hypertension Father      Prostate Cancer Father      Cancer Father      Hypertension Maternal Grandmother      Hypertension Maternal Grandfather      Hypertension Paternal Grandmother      Hypertension Paternal Grandfather      Hypertension Brother      Prostate Cancer Brother      Cancer Brother      Prostate Cancer Brother      Cerebrovascular Disease No family hx of      Thyroid Disease No family hx of      Glaucoma No family hx of      Macular Degeneration No family hx of        Medications:  Current Outpatient Prescriptions   Medication Sig Dispense Refill     amLODIPine (NORVASC) 5 MG tablet TAKE TWO TABLETS BY MOUTH DAILY FOR BLOOD PRESSURE 60 tablet 0     azelastine (OPTIVAR) 0.05 % SOLN ophthalmic solution Apply 1 drop to eye 2 times daily 1 Bottle 11     Cholecalciferol (VITAMIN D3 PO) Take 2,000 Units by mouth daily       hydrochlorothiazide (HYDRODIURIL) 25 MG tablet Take 1 tablet (25 mg) by mouth daily for blood pressure. 90 tablet 1     methylcellulose, laxative, (CITRUCEL) POWD Take by mouth daily       potassium chloride (K-TAB,KLOR-CON) 10 MEQ tablet Take 2 tablets (20 mEq) by mouth daily 180 tablet 1     simvastatin (ZOCOR) 20 MG tablet Take 1 tablet (20 mg) by mouth every evening for cholesterol. 90 tablet 1       Allergies   Allergen Reactions     Lisinopril  Hives, Swelling and Other (See Comments)     Angioedema?     Penicillins Hives and Swelling       Review of Systems:  -Constitutional: The patient denies fatigue, fevers, chills, unintended weight loss, and night sweats.  -Skin: As above in HPI. No additional skin concerns.    Physical exam:  Vitals: There were no vitals taken for this visit.  GEN: This is a well developed, well-nourished male in no acute distress, in a pleasant mood.    SKIN: Waist-up skin, which includes the head/face, neck, both arms, chest, back, abdomen, digits and/or nails was examined. Declines exam of lower extremities  -pigmented macules left flank both 5 mm adjacent to one another  -pigmented patch on mid back 1.2 cm  -There are waxy stuck on tan to brown papules on the face and back.  -Scattered brown macules on sun exposed areas.   -hypopigmented macules, lower legs  -No other lesions of concern on areas examined.      Impression/Plan:  1. Seborrheic keratosis, solar lentigines    Recommend sunscreens SPF #30 or greater, protective clothing and avoidance of tanning beds.    2. 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 Time-out was performed. The area was cleaned with isopropyl alcohol.  was injected to obtain adequate anesthesia of the lesion on the mid back. 0.5ml of 1% lidocaine with 1:100,000 epinephrine was injected to obtain adequate anesthesia. A  shave biopsy was performed. Hemostasis was achieved with aluminium chloride. Vaseline and a sterile dressing were applied. The patient tolerated the procedure and no complications were noted. The patient was provided with verbal and written  post care instructions.      3. Pigmented macules left flank both 5 mm adjacent to one another- consistent with benign nevi but given close proximity we will photograph and follow    Photodocumentation obtained - we will continue to monitor.   4. 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 Suzanna Elmore MD, based on data collection and the provider's statements to me.       Provider Disclosure:   The documentation recorded by the scribe accurately reflects the services I personally performed and the decisions made by me.    Suzanna Elmore MD    Department of Dermatology  [facility]: Phone: [phone], Fax:[phone]  [facility]: Phone: [phone], Fax: [phone]          Again, thank you for allowing me to participate in the care of your patient.        Sincerely,        Suzanna Elmore MD

## 2018-10-16 NOTE — NURSING NOTE
The following medication was given:     MEDICATION:  Lidocaine with epinephrine 1% 1:[id number]  ROUTE: SQ  SITE: mid back  DOSE: 1cc  LOT #: -[id number]  : Herb  EXPIRATION DATE: sept 1, 2019  NDC#: 1210-9307-13   Was there drug waste? No  Multi-dose vial: Yes    Ellen Louis LPN  October 16, 2018

## 2018-10-16 NOTE — MR AVS SNAPSHOT
After Visit Summary   10/16/2018    Erik Youngblood    MRN: [id number]           Patient Information     Date Of Birth          1960        Visit Information        Provider Department      10/16/2018 1:00 PM Suzanna Elmore MD [facility]        Today's Diagnoses     Neoplasm of uncertain behavior of skin    -  1    Solar lentiginosis        Seborrheic keratosis          Care Instructions    Wound Care After a Biopsy    What is a skin biopsy?  A skin biopsy allows the doctor to examine a very small piece of tissue under the microscope to determine the diagnosis and the best treatment for the skin condition. A local anesthetic (numbing medicine)  is injected with a very small needle into the skin area to be tested. A small piece of skin is taken from the area. Sometimes a suture (stitch) is used.     What are the risks of a skin biopsy?  I will experience scar, bleeding, swelling, pain, crusting and redness. I may experience incomplete removal or recurrence. Risks of this procedure are excessive bleeding, bruising, infection, nerve damage, numbness, thick (hypertrophic or keloidal) scar and non-diagnostic biopsy.    How should I care for my wound for the first 24 hours?    Keep the wound dry and covered for 24 hours    If it bleeds, hold direct pressure on the area for 15 minutes. If bleeding does not stop then go to the emergency room    Avoid strenuous exercise the first 1-2 days or as your doctor instructs you    How should I care for the wound after 24 hours?    After 24 hours, remove the bandage    You may bathe or shower as normal    If you had a scalp biopsy, you can shampoo as usual and can use shower water to clean the biopsy site daily    Clean the wound twice a day with gentle soap and water    Do not scrub, be gentle    Apply white petroleum/Vaseline after cleaning the wound with a cotton swab or a clean finger, and keep the site covered with a Bandaid /bandage. Bandages  are not necessary with a scalp biopsy    If you are unable to cover the site with a Bandaid /bandage, re-apply ointment 2-3 times a day to keep the site moist. Moisture will help with healing    Avoid strenuous activity for first 1-2 days    Avoid lakes, rivers, pools, and oceans until the stitches are removed or the site is healed    How do I clean my wound?    Wash hands thoroughly with soap or use hand  before all wound care    Clean the wound with gentle soap and water    Apply white petroleum/Vaseline  to wound after it is clean    Replace the Bandaid /bandage to keep the wound covered for the first few days or as instructed by your doctor    If you had a scalp biopsy, warm shower water to the area on a daily basis should suffice    What should I use to clean my wound?     Cotton-tipped applicators (Qtips )    White petroleum jelly (Vaseline ). Use a clean new container and use Q-tips to apply.    Bandaids   as needed    Gentle soap     How should I care for my wound long term?    Do not get your wound dirty    Keep up with wound care for one week or until the area is healed.    A small scab will form and fall off by itself when the area is completely healed. The area will be red and will become pink in color as it heals. Sun protection is very important for how your scar will turn out. Sunscreen with an SPF 30 or greater is recommended once the area is healed.      You should have some soreness but it should be mild and slowly go away over several days. Talk to your doctor about using tylenol for pain,    When should I call my doctor?  If you have increased:     Pain or swelling    Pus or drainage (clear or slightly yellow drainage is ok)    Temperature over 100F    Spreading redness or warmth around wound    When will I hear about my results?  The biopsy results can take 2-3 weeks to come back. The clinic will call you with the results, send you a Maozhao message, or have you schedule a follow-up  clinic or phone time to discuss the results. Contact our clinics if you do not hear from us in 3 weeks.     Who should I call with questions?    [facility]: [phone]     [facility]: [phone]    For urgent needs outside of business hours call the [facility] at [phone] and ask for the dermatology resident on call              Follow-ups after your visit        Follow-up notes from your care team     Return in about 4 months (around 2/16/2019) for spot check for photo.      Your next 10 appointments already scheduled     Oct 18, 2018  9:20 AM CDT   Office Visit with Chacho Garcia MD   [facility] ([facility]    [address] [address] [phone]           Bring a current list of meds and any records pertaining to this visit. For Physicals, please bring immunization records and any forms needing to be filled out. Please arrive 10 minutes early to complete paperwork.            Feb 12, 2019  8:15 AM CST   Return Visit with Suzanna Elmore MD   [facility] ([facility])    [address] [place] [address] [phone]              Who to contact     If you have questions or need follow up information about today's clinic visit or your schedule please contact [facility] directly at [phone].  Normal or non-critical lab and imaging results will be communicated to you by MyChart, letter or phone within 4 business days after the clinic has received the results. If you do not hear from us within 7 days, please contact the clinic through MyChart or phone. If you have a critical or abnormal lab result, we will notify you by phone as soon as possible.  Submit refill requests through [employer]mi or call your pharmacy and they will forward the refill request to us. Please allow 3 business days for your refill  to be completed.          Additional Information About Your Visit        [employer] Information     [employer] gives you secure access to your electronic health record. If you see a primary care provider, you can also send messages to your care team and make appointments. If you have questions, please call your primary care clinic.  If you do not have a primary care provider, please call [phone] and they will assist you.      [employer]iron is an electronic gateway that provides easy, online access to your medical records. With [employer], you can request a clinic appointment, read your test results, renew a prescription or communicate with your care team.     To access your existing account, please contact your [facility] or call [phone] for assistance.        Care EveryWhere ID     This is your Care EveryWhere ID. This could be used by other organizations to access your [place] medical records  [id number]         Blood Pressure from Last 3 Encounters:   03/22/18 138/78   08/03/17 138/82   02/10/17 148/80    Weight from Last 3 Encounters:   03/22/18 93.4 kg (206 lb)   08/03/17 94.3 kg (208 lb)   02/10/17 95.3 kg (210 lb)              We Performed the Following     BIOPSY SKIN/SUBQ/MUC MEM, SINGLE LESION     Dermatological path order and indications        Primary Care Provider Office Phone # Fax #    Chacho Garcia [phone]       [address]  [facility] [address]        Equal Access to Services     [facility] : Hadii aad ku hadasho Soomaali, waaxda luqadaha, qaybta kaalmada adeegyada, waxay anabell ngo . So [facility] [phone].    ATENCIÓN: Si habla español, tiene a morris disposición servicios gratuitos de asistencia lingüística. Llame al [phone].    We comply with applicable federal civil rights laws and [place] laws. We do not discriminate on the basis of race, color, national origin, age, disability, sex, sexual orientation, or  gender identity.            Thank you!     Thank you for choosing [facility]  for your care. Our goal is always to provide you with excellent care. Hearing back from our patients is one way we can continue to improve our services. Please take a few minutes to complete the written survey that you may receive in the mail after your visit with us. Thank you!             Your Updated Medication List - Protect others around you: Learn how to safely use, store and throw away your medicines at www.disposemymeds.org.          This list is accurate as of 10/16/18  1:38 PM.  Always use your most recent med list.                   Brand Name Dispense Instructions for use Diagnosis    amLODIPine 5 MG tablet    NORVASC    60 tablet    TAKE TWO TABLETS BY MOUTH DAILY FOR BLOOD PRESSURE    Essential hypertension with goal blood pressure less than 140/90       azelastine 0.05 % ophthalmic solution    OPTIVAR    1 Bottle    Apply 1 drop to eye 2 times daily    Allergic conjunctivitis of both eyes       CITRUCEL Powd   Generic drug:  methylcellulose (laxative)      Take by mouth daily        hydrochlorothiazide 25 MG tablet    HYDRODIURIL    90 tablet    Take 1 tablet (25 mg) by mouth daily for blood pressure.    Essential hypertension with goal blood pressure less than 140/90       potassium chloride 10 MEQ tablet    K-TAB,KLOR-CON    180 tablet    Take 2 tablets (20 mEq) by mouth daily    Essential hypertension with goal blood pressure less than 140/90       simvastatin 20 MG tablet    ZOCOR    90 tablet    Take 1 tablet (20 mg) by mouth every evening for cholesterol.    Hyperlipidemia LDL goal <130       VITAMIN D3 PO      Take 2,000 Units by mouth daily

## 2018-10-16 NOTE — PROGRESS NOTES
[facility] Dermatology Note      Dermatology Problem List:  1. NUB s/p bx 10/16/18    Encounter Date: Oct 16, 2018    CC:  Chief Complaint   Patient presents with     Derm Problem     waist up skin check spots on face          History of Present Illness:  Mr. Erik Youngblood is a 58 year old male who presents for a waist up skin check. He was referred to this clinic by his wife. 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 He would like a lesion on his left cheek to be examined due to concern of hitting it while shaving. The patient voices no other concerns.     Past Medical History:   Patient Active Problem List   Diagnosis     Overweight (BMI 25.0-29.9)     Essential hypertension with goal blood pressure less than 140/90     Hyperlipidemia LDL goal <130     Prostate cancer (H)     Status post prostatectomy     History of adenomatous polyp of colon     Advanced directives, counseling/discussion     Advance care planning     Past Medical History:   Diagnosis Date     Adenomatous colon polyp      Allergic conjunctivitis      Cancer (H) 12/22/14    Prostate Cancer / Removed     Complication of anesthesia     nausea     Hypercholesterolemia      Hypertension      Obesity, Class I, BMI 30-34.9 5/19/2016     Spinal stenosis      Past Surgical History:   Procedure Laterality Date     APPENDECTOMY       COLONOSCOPY  8/9/2016    All was good     COLONOSCOPY WITH CO2 INSUFFLATION N/A 8/9/2016    Procedure: COLONOSCOPY WITH CO2 INSUFFLATION;  Surgeon: Evan Brown MD;  Location: [facility]     LAPAROSCOPIC PROSTATECTOMY, LYMPHADENECTOMY, COMBINED  3/20/15    robot assisted, Dr. Anton Alonso     REPAIR PTOSIS       REPAIR PTOSIS BILATERAL  11/23/2012    Procedure: REPAIR PTOSIS BILATERAL;  BILATERAL UPPER LID MECHANICAL PTOSIS REPAIR AND BILATERAL BROWN PTOSIS;  Surgeon: Lowell Wood MD;  Location: [facility]     REPAIR PTOSIS BROW BILATERAL  11/23/2012     Procedure: REPAIR PTOSIS BROW BILATERAL;;  Surgeon: Lowell Wood MD;  Location:  [facility]     VASECTOMY         Social History:   reports that he has never smoked. He has never used smokeless tobacco. He reports that he drinks alcohol. He reports that he does not use illicit drugs.    Family History:  Family History   Problem Relation Age of Onset     Hypertension Mother      Diabetes Mother      Hypertension Father      Prostate Cancer Father      Cancer Father      Hypertension Maternal Grandmother      Hypertension Maternal Grandfather      Hypertension Paternal Grandmother      Hypertension Paternal Grandfather      Hypertension Brother      Prostate Cancer Brother      Cancer Brother      Prostate Cancer Brother      Cerebrovascular Disease No family hx of      Thyroid Disease No family hx of      Glaucoma No family hx of      Macular Degeneration No family hx of        Medications:  Current Outpatient Prescriptions   Medication Sig Dispense Refill     amLODIPine (NORVASC) 5 MG tablet TAKE TWO TABLETS BY MOUTH DAILY FOR BLOOD PRESSURE 60 tablet 0     azelastine (OPTIVAR) 0.05 % SOLN ophthalmic solution Apply 1 drop to eye 2 times daily 1 Bottle 11     Cholecalciferol (VITAMIN D3 PO) Take 2,000 Units by mouth daily       hydrochlorothiazide (HYDRODIURIL) 25 MG tablet Take 1 tablet (25 mg) by mouth daily for blood pressure. 90 tablet 1     methylcellulose, laxative, (CITRUCEL) POWD Take by mouth daily       potassium chloride (K-TAB,KLOR-CON) 10 MEQ tablet Take 2 tablets (20 mEq) by mouth daily 180 tablet 1     simvastatin (ZOCOR) 20 MG tablet Take 1 tablet (20 mg) by mouth every evening for cholesterol. 90 tablet 1       Allergies   Allergen Reactions     Lisinopril Hives, Swelling and Other (See Comments)     Angioedema?     Penicillins Hives and Swelling       Review of Systems:  -Constitutional: The patient denies fatigue, fevers, chills, unintended weight loss, and night sweats.  -Skin: As above in HPI.  No additional skin concerns.    Physical exam:  Vitals: There were no vitals taken for this visit.  GEN: This is a well developed, well-nourished male in no acute distress, in a pleasant mood.    SKIN: Waist-up skin, which includes the head/face, neck, both arms, chest, back, abdomen, digits and/or nails was examined. Declines exam of lower extremities  -pigmented macules left flank both 5 mm adjacent to one another  -pigmented patch on mid back 1.2 cm  -There are waxy stuck on tan to brown papules on the face and back.  -Scattered brown macules on sun exposed areas.   -hypopigmented macules, lower legs  -No other lesions of concern on areas examined.      Impression/Plan:  1. Seborrheic keratosis, solar lentigines    Recommend sunscreens SPF #30 or greater, protective clothing and avoidance of tanning beds.    2. 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 Time-out was performed. The area was cleaned with isopropyl alcohol.  was injected to obtain adequate anesthesia of the lesion on the mid back. 0.5ml of 1% lidocaine with 1:100,000 epinephrine was injected to obtain adequate anesthesia. A  shave biopsy was performed. Hemostasis was achieved with aluminium chloride. Vaseline and a sterile dressing were applied. The patient tolerated the procedure and no complications were noted. The patient was provided with verbal and written post care instructions.      3. Pigmented macules left flank both 5 mm adjacent to one another- consistent with benign nevi but given close proximity we will photograph and follow    Photodocumentation obtained - we will continue to monitor.   4.  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 Suzanna Elmore MD, based on data collection and the provider's statements to me.       Provider Disclosure:   The documentation recorded by the scribe accurately reflects the services I personally performed and the decisions made by me.    Suzanna Elmore MD    Department of Dermatology  [facility]: Phone: [phone], Fax:[phone]  [facility]: Phone: [phone], Fax: [phone]

## 2018-10-16 NOTE — PATIENT INSTRUCTIONS

## 2018-10-16 NOTE — NURSING NOTE
Erik Youngblood's goals for this visit include:   Chief Complaint   Patient presents with     Derm Problem     waist up skin check spots on face      He requests these members of his care team be copied on today's visit information: yes     PCP: Chacho Garcia    Referring Provider:  No referring provider defined for this encounter.    There were no vitals taken for this visit.    Do you need any medication refills at today's visit? No     Amorrae SUMEET Zarate

## 2018-10-18 ENCOUNTER — OFFICE VISIT (OUTPATIENT)
Dept: FAMILY MEDICINE | Facility: CLINIC | Age: 58
End: 2018-10-18
Payer: COMMERCIAL

## 2018-10-18 VITALS
WEIGHT: 212.2 LBS | HEART RATE: 66 BPM | BODY MASS INDEX: 29.6 KG/M2 | SYSTOLIC BLOOD PRESSURE: 162 MMHG | OXYGEN SATURATION: 98 % | DIASTOLIC BLOOD PRESSURE: 80 MMHG | TEMPERATURE: 96.8 F

## 2018-10-18 DIAGNOSIS — I10 ESSENTIAL HYPERTENSION WITH GOAL BLOOD PRESSURE LESS THAN 140/90: ICD-10-CM

## 2018-10-18 DIAGNOSIS — Z11.4 SCREENING FOR HUMAN IMMUNODEFICIENCY VIRUS: ICD-10-CM

## 2018-10-18 DIAGNOSIS — Z23 NEED FOR SHINGLES VACCINE: ICD-10-CM

## 2018-10-18 DIAGNOSIS — E78.5 HYPERLIPIDEMIA LDL GOAL <130: Primary | ICD-10-CM

## 2018-10-18 LAB
ALT SERPL W P-5'-P-CCNC: 49 U/L (ref 0–70)
ANION GAP SERPL CALCULATED.3IONS-SCNC: 9 MMOL/L (ref 3–14)
BUN SERPL-MCNC: 16 MG/DL (ref 7–30)
CALCIUM SERPL-MCNC: 9.5 MG/DL (ref 8.5–10.1)
CHLORIDE SERPL-SCNC: 99 MMOL/L (ref 94–109)
CHOLEST SERPL-MCNC: 143 MG/DL
CO2 SERPL-SCNC: 30 MMOL/L (ref 20–32)
CREAT SERPL-MCNC: 1.17 MG/DL (ref 0.66–1.25)
GFR SERPL CREATININE-BSD FRML MDRD: 64 ML/MIN/1.7M2
GLUCOSE SERPL-MCNC: 94 MG/DL (ref 70–99)
HDLC SERPL-MCNC: 59 MG/DL
HIV 1+2 AB+HIV1 P24 AG SERPL QL IA: NONREACTIVE
LDLC SERPL CALC-MCNC: 66 MG/DL
NONHDLC SERPL-MCNC: 84 MG/DL
POTASSIUM SERPL-SCNC: 3.5 MMOL/L (ref 3.4–5.3)
SODIUM SERPL-SCNC: 138 MMOL/L (ref 133–144)
TRIGL SERPL-MCNC: 90 MG/DL

## 2018-10-18 PROCEDURE — 87389 HIV-1 AG W/HIV-1&-2 AB AG IA: CPT | Performed by: FAMILY MEDICINE

## 2018-10-18 PROCEDURE — 84460 ALANINE AMINO (ALT) (SGPT): CPT | Performed by: FAMILY MEDICINE

## 2018-10-18 PROCEDURE — 36415 COLL VENOUS BLD VENIPUNCTURE: CPT | Performed by: FAMILY MEDICINE

## 2018-10-18 PROCEDURE — 99214 OFFICE O/P EST MOD 30 MIN: CPT | Performed by: FAMILY MEDICINE

## 2018-10-18 PROCEDURE — 80048 BASIC METABOLIC PNL TOTAL CA: CPT | Performed by: FAMILY MEDICINE

## 2018-10-18 PROCEDURE — 80061 LIPID PANEL: CPT | Performed by: FAMILY MEDICINE

## 2018-10-18 RX ORDER — POTASSIUM CHLORIDE 750 MG/1
10 TABLET, EXTENDED RELEASE ORAL DAILY
Qty: 90 TABLET | Refills: 1 | Status: SHIPPED | OUTPATIENT
Start: 2018-10-18 | End: 2021-01-13

## 2018-10-18 RX ORDER — SIMVASTATIN 20 MG
20 TABLET ORAL EVERY EVENING
Qty: 90 TABLET | Refills: 1 | Status: SHIPPED | OUTPATIENT
Start: 2018-10-18 | End: 2019-03-26

## 2018-10-18 RX ORDER — HYDROCHLOROTHIAZIDE 25 MG/1
25 TABLET ORAL DAILY
Qty: 90 TABLET | Refills: 1 | Status: SHIPPED | OUTPATIENT
Start: 2018-10-18 | End: 2019-05-25

## 2018-10-18 RX ORDER — AMLODIPINE BESYLATE 10 MG/1
10 TABLET ORAL DAILY
Qty: 90 TABLET | Refills: 1 | Status: SHIPPED | OUTPATIENT
Start: 2018-10-18 | End: 2019-03-26

## 2018-10-18 NOTE — PROGRESS NOTES
SUBJECTIVE:   Erik Youngblood is a 58 year old male who presents to clinic today for the following health issues:    Hypertension Follow-up    Outpatient blood pressures are being checked at home.  Results are normal.    He reports his blood pressure is typically under 130/90 when he checks it at the store but it tends to be higher when checked in the clinic.    Low Salt Diet: low salt      Amount of exercise or physical activity: 4-5 days/week for an average of 30-45 minutes    Problems taking medications regularly: No    Medication side effects: none    Diet: low salt    Past medical, family, and social histories, medications, and allergies are reviewed and updated in [facility].     ROS:  CONSTITUTIONAL: NEGATIVE for fever, chills, change in weight  ENT/MOUTH: NEGATIVE for ear, mouth and throat problems  RESP: NEGATIVE for significant cough or SOB  CV: NEGATIVE for chest pain, palpitations or peripheral edema  ROS otherwise negative    This document serves as a record of the services and decisions personally performed and made by Dr. Garcia. It was created on his behalf by Ayad Eller, a trained medical scribe. The creation of this document is based the provider's statements to the medical scribe.  Ayad Eller October 18, 2018 9:53 AM     OBJECTIVE:                                                    /86 (BP Location: Right arm, Patient Position: Chair, Cuff Size: Adult Regular)  Pulse 66  Temp 96.8  F (36  C) (Tympanic)  Wt 96.3 kg (212 lb 3.2 oz)  SpO2 98%  BMI 29.6 kg/m2   Body mass index is 29.6 kg/(m^2).   GENERAL: healthy, alert and no distress  EYES: Eyes grossly normal to inspection, PERRL, EOMI, sclerae white and conjunctivae normal  RESP: lungs clear to auscultation - no crackles or wheezes, no areas of dullness, no tachypnea  CV: Heart regular rate and rhythm without murmur, click or rub. No peripheral edema and peripheral pulses strong  MS: no gross musculoskeletal defects noted, no  edema  SKIN: no suspicious lesions or rashes  NEURO: Normal strength and tone, sensory exam grossly normal, mentation intact, oriented times 3 and cranial nerves 2-12 intact  PSYCH: mentation appears normal, affect normal/bright     Diagnostic Test Results:  none      ASSESSMENT/PLAN:                                                      (E78.5) Hyperlipidemia LDL goal <130  (primary encounter diagnosis)  Comment: historically at goal   Plan: simvastatin (ZOCOR) 20 MG tablet, Lipid panel         reflex to direct LDL Non-fasting, ALT        Follow up in 6 months.     (I10) Essential hypertension with goal blood pressure less than 140/90  Comment: uncontrolled on today's visit but normal on more recent checks.  Plan: amLODIPine (NORVASC) 10 MG tablet,         hydrochlorothiazide (HYDRODIURIL) 25 MG tablet,        potassium chloride (K-TAB,KLOR-CON) 10 MEQ         tablet, Basic metabolic panel        Return in about 2 weeks (around 11/1/2018) for nurse (ancillary) blood pressure check. If the blood pressure is still high then, follow up with me for a medication adjustment. Otherwise return in about 5 months (around 3/18/2019) for full physical, blood pressure check.     (Z11.4) Screening for human immunodeficiency virus  Comment: indications for screening discussed with the patient   Plan: HIV Screening          (Z23) Need for shingles vaccine  Comment:   Plan: VIS provided.      The information in this document, created by the medical scribe for me, accurately reflects the services I personally performed and the decisions made by me. I have reviewed and approved this document for accuracy prior to leaving the patient care area. October 18, 2018 9:53 AM     Chacho Garcia MD

## 2018-10-18 NOTE — MR AVS SNAPSHOT
After Visit Summary   10/18/2018    Erik Youngblood    MRN: [id number]           Patient Information     Date Of Birth          1960        Visit Information        Provider Department      10/18/2018 9:20 AM Chacho Garcia MD [facility]        Today's Diagnoses     Hyperlipidemia LDL goal <130    -  1    Essential hypertension with goal blood pressure less than 140/90        Screening for human immunodeficiency virus        Need for shingles vaccine          Care Instructions    Return in about 2 weeks (around 11/1/2018) for nurse (ancillary) blood pressure check.          Follow-ups after your visit        Follow-up notes from your care team     Return in about 5 months (around 3/18/2019) for full physical, blood pressure check.      Your next 10 appointments already scheduled     Feb 12, 2019  8:15 AM CST   [facility] Visit with Suz[facility] Elmore MD   [facility]    [id number] [place] [id number] [phone]              Who to contact     If you have questions or need follow up information about today's clinic visit or your schedule please contact [facility] directly at [phone].  Normal or non-critical lab and imaging results will be communicated to you by MyChart, letter or phone within 4 business days after the clinic has received the results. If you do not hear from us within 7 days, please contact the clinic through MyChart or phone. If you have a critical or abnormal lab result, we will notify you by phone as soon as possible.  Submit refill requests through [facility] or call your pharmacy and they will forward the refill request to us. Please allow 3 business days for your refill to be completed.          Additional Information About Your Visit        MyChart Information     [facility] gives you secure access to your electronic health record. If you see a primary care provider, you can  also send messages to your care team and make appointments. If you have questions, please call your primary care clinic.  If you do not have a primary care provider, please call [phone] and they will assist you.        Care EveryWhere ID     This is your Care EveryWhere ID. This could be used by other organizations to access your [facility] medical records  [id number]        Your Vitals Were     Pulse Temperature Pulse Oximetry BMI (Body Mass Index)          66 96.8  F (36  C) (Tympanic) 98% 29.6 kg/m2         Blood Pressure from Last 3 Encounters:   10/18/18 162/80   03/22/18 138/78   08/03/17 138/82    Weight from Last 3 Encounters:   10/18/18 96.3 kg (212 lb 3.2 oz)   03/22/18 93.4 kg (206 lb)   08/03/17 94.3 kg (208 lb)              We Performed the Following     ALT     Basic metabolic panel     HIV Screening     Lipid panel reflex to direct LDL Non-fasting          Today's Medication Changes          These changes are accurate as of 10/18/18 10:04 AM.  If you have any questions, ask your nurse or doctor.               These medicines have changed or have updated prescriptions.        Dose/Directions    amLODIPine 10 MG tablet   Commonly known as:  NORVASC   This may have changed:    - medication strength  - See the new instructions.   Used for:  Essential hypertension with goal blood pressure less than 140/90   Changed by:  Chacho Garcia MD        Dose:  10 mg   Take 1 tablet (10 mg) by mouth daily for blood pressure.   Quantity:  90 tablet   Refills:  1            Where to get your medicines      These medications were sent to [facility] - [place], [place] - [address]  [address], [facility] [address]     Phone:  [phone]     amLODIPine 10 MG tablet    hydrochlorothiazide 25 MG tablet    potassium chloride 10 MEQ tablet    simvastatin 20 MG tablet                Primary Care Provider Office Phone # Fax #    Chacho Garcia [phone]        [address]  [facility] [address]        Equal Access to Services     JUICE SPECNE : Hadii aad ku hadasho Soomaali, waaxda luqadaha, qaybta kaalmada adeegyada, luanne hung saydadaniel selbyallyyesenia ngo . So [facility] [phone].    ATENCIÓN: Si habla español, tiene a morris disposición servicios gratuitos de asistencia lingüística. Llame al [phone].    We comply with applicable federal civil rights laws and [place] laws. We do not discriminate on the basis of race, color, national origin, age, disability, sex, sexual orientation, or gender identity.            Thank you!     Thank you for choosing [facility]  for your care. Our goal is always to provide you with excellent care. Hearing back from our patients is one way we can continue to improve our services. Please take a few minutes to complete the written survey that you may receive in the mail after your visit with us. Thank you!             Your Updated Medication List - Protect others around you: Learn how to safely use, store and throw away your medicines at www.disposemymeds.org.          This list is accurate as of 10/18/18 10:04 AM.  Always use your most recent med list.                   Brand Name Dispense Instructions for use Diagnosis    amLODIPine 10 MG tablet    NORVASC    90 tablet    Take 1 tablet (10 mg) by mouth daily for blood pressure.    Essential hypertension with goal blood pressure less than 140/90       CITRUCEAAYUSH Edge   Generic drug:  methylcellulose (laxative)      Take by mouth daily        hydrochlorothiazide 25 MG tablet    HYDRODIURIL    90 tablet    Take 1 tablet (25 mg) by mouth daily for blood pressure.    Essential hypertension with goal blood pressure less than 140/90       potassium chloride 10 MEQ tablet    K-TAB,KLOR-CON    90 tablet    Take 1 tablet (10 mEq) by mouth daily    Essential hypertension with goal blood pressure less than 140/90       simvastatin 20 MG tablet    ZOCOR    90 tablet    Take 1  tablet (20 mg) by mouth every evening for cholesterol.    Hyperlipidemia LDL goal <130       VITAMIN D3 PO      Take 2,000 Units by mouth daily

## 2018-10-22 LAB — COPATH REPORT: NORMAL

## 2018-11-01 ENCOUNTER — OFFICE VISIT (OUTPATIENT)
Dept: DERMATOLOGY | Facility: CLINIC | Age: 58
End: 2018-11-01
Payer: COMMERCIAL

## 2018-11-01 VITALS — HEART RATE: 72 BPM | SYSTOLIC BLOOD PRESSURE: 157 MMHG | DIASTOLIC BLOOD PRESSURE: 84 MMHG

## 2018-11-01 DIAGNOSIS — D22.5 ATYPICAL NEVUS OF BACK: Primary | ICD-10-CM

## 2018-11-01 PROCEDURE — 12032 INTMD RPR S/A/T/EXT 2.6-7.5: CPT | Performed by: DERMATOLOGY

## 2018-11-01 PROCEDURE — 11403 EXC TR-EXT B9+MARG 2.1-3CM: CPT | Performed by: DERMATOLOGY

## 2018-11-01 PROCEDURE — 88305 TISSUE EXAM BY PATHOLOGIST: CPT | Mod: TC | Performed by: DERMATOLOGY

## 2018-11-01 ASSESSMENT — PAIN SCALES - GENERAL: PAINLEVEL: NO PAIN (0)

## 2018-11-01 NOTE — PROGRESS NOTES
DERMATOLOGY EXCISION PROCEDURE NOTE    Dermatology Problem List:  1. Moderately atypical nevus, mid back - s/p excision 11/1/18    NAME OF PROCEDURE: Excision intermediate layered linear closure  Staff surgeon: Arjun Thomas DO  Scrub Nurse: Brittany Zarate CMA     PRE-OPERATIVE DIAGNOSIS:  Compound dysplastic nevus with moderate atypia   POST-OPERATIVE DIAGNOSIS: same   FINAL EXCISION SIZE(DEFECT SIZE): 2.1 x 1.9 cm, with 3 mm margin   FINAL REPAIR LENGTH: 5.0 cm     INDICATIONS: This patient presented with a 1.5 x 1.3 cm moderately atypical nevus of the mid back. Excision was indicated. We discussed the principles of treatment and most likely complications including scarring, bleeding, infection, incomplete excision, wound dehiscence, pain, nerve damage, and recurrence. Informed consent was obtained and the patient underwent the procedure as follows:    PROCEDURE: The patient was taken to the operative suite. Time-out was performed.  The treatment area was anesthetized with 1% lidocaine and epinephrine (1:100,000). The area was prepped with Chlorhexidine and rinsed with sterile saline and draped with sterile towels. The lesion was delineated and excised down to subcutaneous fat in a elliptical manner. Hemostasis was obtained by electrocoagulation.     REPAIR: An intermediate layered linear closure was selected as the procedure which would maximally preserve both function and cosmesis.    After the excision of the tumor, the area was carefully undermined. Hemostasis was obtained with electrocoagulation.  Closure was oriented so that the wound was in the patient's natural skin tension lines. The subcutaneous and dermal layers were then closed with 3-0 Vicryl sutures. The epidermis was then carefully approximated along the length of the wound using 4-0 Vicryl running subcuticular sutures.     The final wound length was 5.0 cm. A total of 8.0 ml of anesthesia was administered for all surgical sites. Estimated blood  loss was less than 10 ml for all surgical sites. A sterile pressure dressing was applied and wound care instructions, with a written handout, were given. The patient was [facility] alert and ambulatory.    Follow-up as needed for evaluation of wound.       Anatomic Pathology Results: pending      Staff Involved:    Scribe Disclosure  I, Hill Ku, am serving as a scribe to document services personally performed by Dr. Arjun Thomas DO, based on data collection and the provider's statements to me.     Provider Disclosure:   The documentation recorded by the scribe accurately reflects the services I personally performed and the decisions made by me.  I personally performed the procedures today.    Arjun Thomas DO    Department of Dermatology  [facility]: Phone: [phone], Fax:[phone]  [facility]: Phone: [phone], Fax: [phone]      Performed at:   [facility]    [address] [place] [address]

## 2018-11-01 NOTE — NURSING NOTE
Excision Intake                                                    /84  Pulse 72    Do you take the following medications:  Coumadin, Eliquis, Pradaxa, Xarelto:   NO  -If on Coumadin, INR should be checked within 7 days of surgery.  Range is 3.5 or less or within therapeutic range.  Antibiotic Prophylaxis:  NO    Do you have an iodine allergy:  NO    Past Medical History                                                    Do you currently or have you previously had any of the following conditions:       HIV/AIDS:  NO    Hepatitis:  NO    Suppressed Immune System:  NO    Autoimmune disorder (eg RA, Lupus):  NO    Fever blisters/herpes, cold sores:  YEs    Kidney disease:  NO  Creatinine   Date Value Ref Range Status   10/18/2018 1.17 0.66 - 1.25 mg/dL Final   ]    Pacemaker or Defibrillator:  NO.      History of artificial or heart valve replacement:  No     Endocarditis: NO    Organ transplant: NO.      Joint replacement within past 2 years: NO    Previous prosthetic joint infection: NO    Diabetes: NO    Pregnant:: NO    Tobacco use:  NO.    History   Smoking Status     Never Smoker   Smokeless Tobacco     Never Used     Reviewed by:  SUMEET Manzano CMA

## 2018-11-01 NOTE — LETTER
11/1/2018         RE: Erik Youngblood  [address]  [facility] [address]        Dear Colleague,    Thank you for referring your patient, Erik Youngblood, to the [facility]. Please see a copy of my visit note below.    DERMATOLOGY EXCISION PROCEDURE NOTE    Dermatology Problem List:  1. Moderately atypical nevus, mid back - s/p excision 11/1/18    NAME OF PROCEDURE: Excision intermediate layered linear closure  Staff surgeon: Arjun Thomas DO  Scrub Nurse: Brittany Zarate CMA     PRE-OPERATIVE DIAGNOSIS:  Compound dysplastic nevus with moderate atypia   POST-OPERATIVE DIAGNOSIS: same   FINAL EXCISION SIZE(DEFECT SIZE): 2.1 x 1.9 cm, with 3 mm margin   FINAL REPAIR LENGTH: 5.0 cm     INDICATIONS: This patient presented with a 1.5 x 1.3 cm moderately atypical nevus of the mid back. Excision was indicated. We discussed the principles of treatment and most likely complications including scarring, bleeding, infection, incomplete excision, wound dehiscence, pain, nerve damage, and recurrence. Informed consent was obtained and the patient underwent the procedure as follows:    PROCEDURE: The patient was taken to the operative suite. Time-out was performed.  The treatment area was anesthetized with 1% lidocaine and epinephrine (1:100,000). The area was prepped with Chlorhexidine and rinsed with sterile saline and draped with sterile towels. The lesion was delineated and excised down to subcutaneous fat in a elliptical manner. Hemostasis was obtained by electrocoagulation.     REPAIR: An intermediate layered linear closure was selected as the procedure which would maximally preserve both function and cosmesis.    After the excision of the tumor, the area was carefully undermined. Hemostasis was obtained with electrocoagulation.  Closure was oriented so that the wound was in the patient's natural skin tension lines. The subcutaneous and dermal layers were then closed with 3-0 Vicryl sutures. The  epidermis was then carefully approximated along the length of the wound using 4-0 Vicryl running subcuticular sutures.     The final wound length was 5.0 cm. A total of 8.0 ml of anesthesia was administered for all surgical sites. Estimated blood loss was less than 10 ml for all surgical sites. A sterile pressure dressing was applied and wound care instructions, with a written handout, were given. The patient was discharged from the [facility] alert and ambulatory.    Follow-up as needed for evaluation of wound.       Anatomic Pathology Results: pending      Staff Involved:    Scribe Disclosure  I, Hill Ku, quentin serving as a scribe to document services personally performed by Dr. Arjun Thomas DO, based on data collection and the provider's statements to me.     Provider Disclosure:   The documentation recorded by the scribe accurately reflects the services I personally performed and the decisions made by me.  I personally performed the procedures today.    Arjun Thomas DO    Department of Dermatology  [facility]: Phone: [phone], Fax:[phone]  [facility]: Phone: [phone], Fax: [phone]      Performed at:   [facility]    [address] [place] [address]    Again, thank you for allowing me to participate in the care of your patient.        Sincerely,        Arjun Thomas MD

## 2018-11-01 NOTE — NURSING NOTE
Erik Youngblood's goals for this visit include:   Chief Complaint   Patient presents with     Procedure     Excision: mid back      He requests these members of his care team be copied on today's visit information: yes    PCP: Chacho Garcia    Referring Provider:  No referring provider defined for this encounter.    /84  Pulse 72    Do you need any medication refills at today's visit? No     Amorrae SUMEET Zarate

## 2018-11-01 NOTE — PATIENT INSTRUCTIONS

## 2018-11-01 NOTE — MR AVS SNAPSHOT
After Visit Summary   11/1/2018    Erik Youngblood    MRN: [id number]           Patient Information     Date Of Birth          1960        Visit Information        Provider Department      11/1/2018 1:00 PM Arjun Thomas MD [facility]        Today's Diagnoses     Atypical nevus of back    -  1      Care Instructions    Excision Wound Care Instructions  I will experience scar, altered skin color, bleeding, swelling, pain, crusting and redness. I may experience altered sensation. Risks are excessive bleeding, infection, muscle weakness, thick (hypertrophic or keloidal) scar, and recurrence,. A second procedure may be recommended to obtain the best cosmetic or functional result.  Possible complications of any surgical procedure are bleeding, infection, scarring, alteration in skin color and sensation, muscle weakness in the area, wound dehiscence or seperation, or recurrence of the lesion or disease. On occasion, after healing, a secondary procedure or revision may be recommended in order to obtain the best cosmetic or functional result.   After your surgery, a pressure bandage will be placed over the area that has sutures. This will help prevent bleeding. Please follow these instructions, as they will help you to prevent complications as your wound heals.  For the First 48 hours After Surgery:  1. Leave the pressure bandage on and keep it dry. If it should come loose, you may retape it, but do not take it off.  2. Relax and take it easy. Do not do any vigorous exercise, heavy lifting, or bending forward. This could cause the wound to bleed.  3. Post-operative pain is usually mild. You may take plain or extra strength Tylenol every 4 hours as needed (do not take more than 4,000mg in one day). Do not take any medicine that contains aspirin, ibuprofen or motrin unless you have been recommended these by a doctor.  Avoid alcohol and vitamin E as these may increase your tendency to  bleed.  4. You may put an ice pack around the bandaged area for 20 minutes every 2-3 hours. This may help reduce swelling, bruising, and pain. Make sure the ice pack is waterproof so that the pressure bandage does not get wet.   5. You may see a small amount of drainage or blood on your pressure bandage. This is normal. However, if drainage or bleeding continues or saturates the bandage, you will need to apply firm pressure over the bandage with a washcloth for 15 minutes. If bleeding continues after applying pressure for 15 minutes then go to the nearest emergency room.  48 Hours After Surgery  Carefully remove the bandage and start daily wound care and dressing changes. You may also now shower and get the wound wet. Wash wound with a mild soap and water.  Use caution when washing the wound. Be gentle and do not let the forceful shower stream hit the wound directly.  PAT dry.  Daily Wound Care:  1. Wash wound with a mild soap and water.  Use caution when washing the wound, be gentle and do not let the forceful shower stream hit the wound directly.  2. PAT DRY.  3. Apply Vaseline (from a new container or tube) over the suture line with a Q-tip. It is very important to keep the wound continuously moist, as wounds heal best in a moist environment.  4.  Keep the site covered until sutures are removed, you can cover it with a Telfa (non-stick) dressing and tape or a band-aid.    5. If you are unable to keep wound covered, you must apply Vaseline every 2 - 3 hours (while awake) to ensure it is being kept moist for optimal healing. A dressing overnight is recommended to keep the area moist.   Call Us If:  1. You have pain that is not controlled with Tylenol.  2. You have signs or symptoms of an infection, such as: fever over 100 degrees F, redness, warmth, or foul-smelling or yellow/creamy drainage from the wound.  Who should I call with questions?    [facility]: [phone]      [facility]: [phone]    For urgent needs outside of business hours call the [facility] at [phone] and ask for the dermatology resident on call              Follow-ups after your visit        Your next 10 appointments already scheduled     Feb 12, 2019  8:15 AM CST   Return Visit with Suzanna Elmore MD   [facility] ([facility])    [address] [place] [id number] [phone]              Who to contact     If you have questions or need follow up information about today's clinic visit or your schedule please contact [facility] directly at [phone].  Normal or non-critical lab and imaging results will be communicated to you by [employer], letter or phone within 4 business days after the clinic has received the results. If you do not hear from us within 7 days, please contact the clinic through [employer] or phone. If you have a critical or abnormal lab result, we will notify you by phone as soon as possible.  Submit refill requests through [employer] or call your pharmacy and they will forward the refill request to us. Please allow 3 business days for your refill to be completed.          Additional Information About Your Visit        [employer] Information     [employer] gives you secure access to your electronic health record. If you see a primary care provider, you can also send messages to your care team and make appointments. If you have questions, please call your primary care clinic.  If you do not have a primary care provider, please call [phone] and they will assist you.      [employer] is an electronic gateway that provides easy, online access to your medical records. With [employer] you can request a clinic appointment, read your test results, renew a prescription or communicate with your care team.     To access your existing account, please contact your [facility] or  call [phone] for assistance.        Care EveryWhere ID     This is your Care EveryWhere ID. This could be used by other organizations to access your [facility] medical records  [id number]        Your Vitals Were     Pulse                   72            Blood Pressure from Last 3 Encounters:   11/01/18 157/84   10/18/18 162/80   03/22/18 138/78    Weight from Last 3 Encounters:   10/18/18 96.3 kg (212 lb 3.2 oz)   03/22/18 93.4 kg (206 lb)   08/03/17 94.3 kg (208 lb)              We Performed the Following     56967 - EXC BENIGN SKIN LESION TRUNK/ARM/LEG 2.1-3.0 CM     Dermatological path order and indications     REPAIR INTERMED, WOUND TRUNK/ARM/LEG 2.6-7.5 CM        Primary Care Provider Office Phone # Fax #    Chacho Garcia [phone]       [address]  [facility] [address]        Equal Access to Services     [facility]: Hadii aad ku hadasho Soomaali, waaxda luqadaha, qaybta kaalmada adeegyada, waxay idiin hayaan adeeg kharash la'aan . So [facility] [phone].    ATENCIÓN: Si habla español, tiene a morris disposición servicios gratuitos de asistencia lingüística. Llame al [phone].    We comply with applicable federal civil rights laws and [place] laws. We do not discriminate on the basis of race, color, national origin, age, disability, sex, sexual orientation, or gender identity.            Thank you!     Thank you for choosing [facility]  for your care. Our goal is always to provide you with excellent care. Hearing back from our patients is one way we can continue to improve our services. Please take a few minutes to complete the written survey that you may receive in the mail after your visit with us. Thank you!             Your Updated Medication List - Protect others around you: Learn how to safely use, store and throw away your medicines at www.disposemymeds.org.          This list is accurate as of 11/1/18  2:18 PM.  Always use your most recent med  list.                   Brand Name Dispense Instructions for use Diagnosis    amLODIPine 10 MG tablet    NORVASC    90 tablet    Take 1 tablet (10 mg) by mouth daily for blood pressure.    Essential hypertension with goal blood pressure less than 140/90       MAILE Edge   Generic drug:  methylcellulose (laxative)      Take by mouth daily        hydrochlorothiazide 25 MG tablet    HYDRODIURIL    90 tablet    Take 1 tablet (25 mg) by mouth daily for blood pressure.    Essential hypertension with goal blood pressure less than 140/90       potassium chloride 10 MEQ tablet    K-TAB,KLOR-CON    90 tablet    Take 1 tablet (10 mEq) by mouth daily    Essential hypertension with goal blood pressure less than 140/90       simvastatin 20 MG tablet    ZOCOR    90 tablet    Take 1 tablet (20 mg) by mouth every evening for cholesterol.    Hyperlipidemia LDL goal <130       VITAMIN D3 PO      Take 2,000 Units by mouth daily

## 2018-11-07 LAB — COPATH REPORT: NORMAL

## 2018-11-08 ENCOUNTER — TELEPHONE (OUTPATIENT)
Dept: DERMATOLOGY | Facility: CLINIC | Age: 58
End: 2018-11-08

## 2018-11-08 NOTE — TELEPHONE ENCOUNTER
Dermatological path order and indications   Status:  Final result   Visible to patient:  Yes (MyChart) Dx:  Atypical nevus of back Order: [id number]       Notes Recorded by Gay Staley LPN on 11/8/2018 at 10:07 AM  Writer called and spoke with patient regarding pathology results. Patient verbalized understanding and had no further questions or concerns at this time. Patient stated area is healing well.    Gay Staley LPN    ------    Notes Recorded by Nan Romero MD on 11/7/2018 at 3:21 PM  Please contact the patient to let him know the pathology results.  The excision margins were clear. As long as the wound is healing well, no additional surgery is necessary. Thank you.        7d ago       Copath Report Patient Name: TIFFANIE MONDRAGON   MR#: [id number]   Specimen #: [id number]   Collected: 11/1/2018   Received: 11/2/2018   Reported: 11/7/2018 11:50   Ordering Phy(s): NAN ROMERO     For improved result formatting, select 'View Enhanced Report Format' under    Linked Documents section.     SPECIMEN(S):   Ellipse, mid back     FINAL DIAGNOSIS:   Ellipse, mid back:   - Scar from the prior surgical procedure, with no evidence of residual   lesion - (see description)                Gay Staley LPN

## 2019-02-12 ENCOUNTER — OFFICE VISIT (OUTPATIENT)
Dept: DERMATOLOGY | Facility: CLINIC | Age: 59
End: 2019-02-12
Payer: COMMERCIAL

## 2019-02-12 DIAGNOSIS — L82.1 SK (SEBORRHEIC KERATOSIS): ICD-10-CM

## 2019-02-12 DIAGNOSIS — L80 VITILIGO: ICD-10-CM

## 2019-02-12 DIAGNOSIS — Z86.018 HISTORY OF DYSPLASTIC NEVUS: Primary | ICD-10-CM

## 2019-02-12 DIAGNOSIS — D48.5 NEOPLASM OF UNCERTAIN BEHAVIOR OF SKIN: ICD-10-CM

## 2019-02-12 LAB — TSH SERPL DL<=0.005 MIU/L-ACNC: 1.96 MU/L (ref 0.4–4)

## 2019-02-12 PROCEDURE — 11102 TANGNTL BX SKIN SINGLE LES: CPT | Mod: 59 | Performed by: DERMATOLOGY

## 2019-02-12 PROCEDURE — 84443 ASSAY THYROID STIM HORMONE: CPT | Performed by: DERMATOLOGY

## 2019-02-12 PROCEDURE — 36415 COLL VENOUS BLD VENIPUNCTURE: CPT | Performed by: DERMATOLOGY

## 2019-02-12 PROCEDURE — 17110 DESTRUCTION B9 LES UP TO 14: CPT | Performed by: DERMATOLOGY

## 2019-02-12 PROCEDURE — 88305 TISSUE EXAM BY PATHOLOGIST: CPT | Mod: TC | Performed by: DERMATOLOGY

## 2019-02-12 PROCEDURE — 99213 OFFICE O/P EST LOW 20 MIN: CPT | Mod: 25 | Performed by: DERMATOLOGY

## 2019-02-12 ASSESSMENT — PAIN SCALES - GENERAL: PAINLEVEL: NO PAIN (0)

## 2019-02-12 NOTE — PROGRESS NOTES
[facility] Dermatology Note      Dermatology Problem List:  1. Hx of DN  - Compound dysplastic nevus with mild atypia, s/p excision 11/1/18    Encounter Date: Feb 12, 2019    CC:  Chief Complaint   Patient presents with     RECHECK     Erik is returning for a spot check; one area on the right check and left temple          History of Present Illness:  Mr. Erik Youngblood is a 58 year old male who presents for follow up spot check. Last seen 11/1/18 for excision of a DN on the back with Dr. Thomas. Today, the pt reports this site heals well. He has two new areas of concern: a lesion on the left cheek which he hits with his razor while shaving. Additionally, there is a lesion on the right temple that the pt would like treated, as it is become more exposed as he loses hair. No other concerns.     Past Medical History:   Patient Active Problem List   Diagnosis     Overweight (BMI 25.0-29.9)     Essential hypertension with goal blood pressure less than 140/90     Hyperlipidemia LDL goal <130     Prostate cancer (H)     Status post prostatectomy     History of adenomatous polyp of colon     Advanced directives, counseling/discussion     Advance care planning     Past Medical History:   Diagnosis Date     Adenomatous colon polyp      Allergic conjunctivitis      Cancer (H) 12/22/14    Prostate Cancer / Removed     Complication of anesthesia     nausea     Hypercholesterolemia      Hypertension      Obesity, Class I, BMI 30-34.9 5/19/2016     Spinal stenosis      Past Surgical History:   Procedure Laterality Date     APPENDECTOMY       COLONOSCOPY  8/9/2016    All was good     COLONOSCOPY WITH CO2 INSUFFLATION N/A 8/9/2016    Procedure: COLONOSCOPY WITH CO2 INSUFFLATION;  Surgeon: Evan Brown MD;  Location: [facility] OR     LAPAROSCOPIC PROSTATECTOMY, LYMPHADENECTOMY, COMBINED  3/20/15    robot assisted, Dr. Anton Alonso     REPAIR PTOSIS       REPAIR PTOSIS BILATERAL  11/23/2012    Procedure:  REPAIR PTOSIS BILATERAL;  BILATERAL UPPER LID MECHANICAL PTOSIS REPAIR AND BILATERAL BROWN PTOSIS;  Surgeon: Lowell Wood MD;  Location: [facility]     REPAIR PTOSIS BROW BILATERAL  11/23/2012    Procedure: REPAIR PTOSIS BROW BILATERAL;;  Surgeon: Lowell Wood MD;  Location: [facility]     VASECTOMY         Social History:   reports that  has never smoked. he has never used smokeless tobacco. He reports that he drinks alcohol. He reports that he does not use drugs.    Family History:  Family History   Problem Relation Age of Onset     Hypertension Mother      Diabetes Mother      Hypertension Father      Prostate Cancer Father      Cancer Father      Hypertension Maternal Grandmother      Hypertension Maternal Grandfather      Hypertension Paternal Grandmother      Hypertension Paternal Grandfather      Hypertension Brother      Prostate Cancer Brother      Cancer Brother      Prostate Cancer Brother      Cerebrovascular Disease No family hx of      Thyroid Disease No family hx of      Glaucoma No family hx of      Macular Degeneration No family hx of        Medications:  Current Outpatient Medications   Medication Sig Dispense Refill     amLODIPine (NORVASC) 10 MG tablet Take 1 tablet (10 mg) by mouth daily for blood pressure. 90 tablet 1     Cholecalciferol (VITAMIN D3 PO) Take 2,000 Units by mouth daily       hydrochlorothiazide (HYDRODIURIL) 25 MG tablet Take 1 tablet (25 mg) by mouth daily for blood pressure. 90 tablet 1     methylcellulose, laxative, (CITRUCEL) POWD Take by mouth daily       potassium chloride (K-TAB,KLOR-CON) 10 MEQ tablet Take 1 tablet (10 mEq) by mouth daily 90 tablet 1     simvastatin (ZOCOR) 20 MG tablet Take 1 tablet (20 mg) by mouth every evening for cholesterol. 90 tablet 1       Allergies   Allergen Reactions     Lisinopril Hives, Swelling and Other (See Comments)     Angioedema?     Penicillins Hives and Swelling       Review of Systems:  -Constitutional: The patient denies  fatigue, fevers, chills, unintended weight loss, and night sweats.  -Skin: As above in HPI. No additional skin concerns.    Physical exam:  Vitals: There were no vitals taken for this visit.  GEN: This is a well developed, well-nourished male in no acute distress, in a pleasant mood.    SKIN: Focused exam of face, right shin, left flank examined.   - Depigmented macule on the right shin  - 1.8 cm pigmented plaque on the right temporal hairline  - There is a waxy stuck on tan to brown papule on the left cheek.  - Pigmented macules left flank both 5 mm  -No other lesions of concern on areas examined.      Impression/Plan:    1. Pigmented macules left flank both 5 mm adjacent to one another- consistent with benign nevi but given close proximity-unchanged.     Photodocumentation obtained - we will continue to monitor.   2. Depigmented macule, right shin - Woods lamp positive, consistent with vitiligo. Pt reports he has had this for years.     Labs today - TSH    We will monitor for any changes. He declines biopsy but lesion has not changed.     3. 1.8 cm pigmented plaque on the right temporal hairline. NUB. Differential includes SK vs nevus vs other. Reviewed he will have hair loss    Shave biopsy:  After discussion of benefits and risks including but not limited to bleeding/bruising, pain/swelling, infection, scar, incomplete removal, nerve damage/numbness, recurrence, and non-diagnostic biopsy, written consent, verbal consent and photographs were obtained. Time-out was performed. The area was cleaned with isopropyl alcohol. 0.5mL of 1% lidocaine with epinephrine was injected to obtain adequate anesthesia of the lesion on the right temporal hairline. A shave biopsy was performed. Hemostasis was achieved with Hyfrecator. Vaseline and a sterile dressing were applied. The patient tolerated the procedure and no complications were noted. The patient was provided with verbal and written post care instructions.     4. SK,  left cheek. Hx of catching on razor when shaving.   Cryotherapy procedure note: After verbal consent and discussion of risks and benefits including but no limited to dyspigmentation/scar, blister, and pain, 1 was(were) treated with 1-2mm freeze border for 2 cycles with liquid nitrogen. Post cryotherapy instructions were provided.      Follow up in  October for skin exam.     Staff Involved:  Scribe/Staff    Scribe Disclosure  I, Manish Martinez, am serving as a scribe to document services personally performed by Dr. Suzanna Elmore MD, based on data collection and the provider's statements to me.     Provider Disclosure:   The documentation recorded by the scribe accurately reflects the services I personally performed and the decisions made by me.    Suzanna Elmore MD    Department of Dermatology  [facility]: Phone: [phone], Fax:[phone]  [facility]: Phone: [phone], Fax: [phone]

## 2019-02-12 NOTE — PATIENT INSTRUCTIONS
Cryotherapy    What is it?    Use of a very cold liquid, such as liquid nitrogen, to freeze and destroy abnormal skin cells that need to be removed    What should I expect?    Tenderness and redness    A small blister that might grow and fill with dark purple blood. There may be crusting.    More than one treatment may be needed if the lesions do not go away.    How do I care for the treated area?    Gently wash the area with your hands when bathing.    Use a thin layer of Vaseline to help with healing. You may use a Band-Aid.     The area should heal within 7-10 days and may leave behind a pink or lighter color.     Do not use an antibiotic or Neosporin ointment.     You may take acetaminophen (Tylenol) for pain.     Call your Doctor if you have:    Severe pain    Signs of infection (warmth, redness, cloudy yellow drainage, and or a bad smell)    Questions or concerns    Who should I call with questions?       [facility]: [phone]       [facility]: [phone]       For urgent needs outside of business hours call the [facility] at [phone]        and ask for the dermatology resident on call  Wound Care After a Biopsy    What is a skin biopsy?  A skin biopsy allows the doctor to examine a very small piece of tissue under the microscope to determine the diagnosis and the best treatment for the skin condition. A local anesthetic (numbing medicine)  is injected with a very small needle into the skin area to be tested. A small piece of skin is taken from the area. Sometimes a suture (stitch) is used.     What are the risks of a skin biopsy?  I will experience scar, bleeding, swelling, pain, crusting and redness. I may experience incomplete removal or recurrence. Risks of this procedure are excessive bleeding, bruising, infection, nerve damage, numbness, thick (hypertrophic or keloidal) scar and non-diagnostic biopsy.    How should I care for  my wound for the first 24 hours?    Keep the wound dry and covered for 24 hours    If it bleeds, hold direct pressure on the area for 15 minutes. If bleeding does not stop then go to the emergency room    Avoid strenuous exercise the first 1-2 days or as your doctor instructs you    How should I care for the wound after 24 hours?    After 24 hours, remove the bandage    You may bathe or shower as normal    If you had a scalp biopsy, you can shampoo as usual and can use shower water to clean the biopsy site daily    Clean the wound twice a day with gentle soap and water    Do not scrub, be gentle    Apply white petroleum/Vaseline after cleaning the wound with a cotton swab or a clean finger, and keep the site covered with a Bandaid /bandage. Bandages are not necessary with a scalp biopsy    If you are unable to cover the site with a Bandaid /bandage, re-apply ointment 2-3 times a day to keep the site moist. Moisture will help with healing    Avoid strenuous activity for first 1-2 days    Avoid lakes, rivers, pools, and oceans until the stitches are removed or the site is healed    How do I clean my wound?    Wash hands thoroughly with soap or use hand  before all wound care    Clean the wound with gentle soap and water    Apply white petroleum/Vaseline  to wound after it is clean    Replace the Bandaid /bandage to keep the wound covered for the first few days or as instructed by your doctor    If you had a scalp biopsy, warm shower water to the area on a daily basis should suffice    What should I use to clean my wound?     Cotton-tipped applicators (Qtips )    White petroleum jelly (Vaseline ). Use a clean new container and use Q-tips to apply.    Bandaids   as needed    Gentle soap     How should I care for my wound long term?    Do not get your wound dirty    Keep up with wound care for one week or until the area is healed.    A small scab will form and fall off by itself when the area is completely  healed. The area will be red and will become pink in color as it heals. Sun protection is very important for how your scar will turn out. Sunscreen with an SPF 30 or greater is recommended once the area is healed.      You should have some soreness but it should be mild and slowly go away over several days. Talk to your doctor about using tylenol for pain,    When should I call my doctor?  If you have increased:     Pain or swelling    Pus or drainage (clear or slightly yellow drainage is ok)    Temperature over 100F    Spreading redness or warmth around wound    When will I hear about my results?  The biopsy results can take 2-3 weeks to come back. The clinic will call you with the results, send you a [employer] message, or have you schedule a follow-up clinic or phone time to discuss the results. Contact our clinics if you do not hear from us in 3 weeks.     Who should I call with questions?    [facility]: [phone]     [facility]: [phone]    For urgent needs outside of business hours call the [facility] at [phone] and ask for the dermatology resident on call      GET BLOOD DRAW TODAY

## 2019-02-12 NOTE — LETTER
2/12/2019         RE: Erik Youngblood  [address]  [facility] [address]        Dear Colleague,    Thank you for referring your patient, Erik Youngblood, to the [facility]. Please see a copy of my visit note below.    [facility] Dermatology Note      Dermatology Problem List:  1. Hx of DN  - Compound dysplastic nevus with mild atypia, s/p excision 11/1/18    Encounter Date: Feb 12, 2019    CC:  Chief Complaint   Patient presents with     RECHECK     Erik is returning for a spot check; one area on the right check and left temple          History of Present Illness:  Mr. Erik Youngblood is a 58 year old male who presents for follow up spot check. Last seen 11/1/18 for excision of a DN on the back with Dr. Thomas. Today, the pt reports this site heals well. He has two new areas of concern: a lesion on the left cheek which he hits with his razor while shaving. Additionally, there is a lesion on the right temple that the pt would like treated, as it is become more exposed as he loses hair. No other concerns.     Past Medical History:   Patient Active Problem List   Diagnosis     Overweight (BMI 25.0-29.9)     Essential hypertension with goal blood pressure less than 140/90     Hyperlipidemia LDL goal <130     Prostate cancer (H)     Status post prostatectomy     History of adenomatous polyp of colon     Advanced directives, counseling/discussion     Advance care planning     Past Medical History:   Diagnosis Date     Adenomatous colon polyp      Allergic conjunctivitis      Cancer (H) 12/22/14    Prostate Cancer / Removed     Complication of anesthesia     nausea     Hypercholesterolemia      Hypertension      Obesity, Class I, BMI 30-34.9 5/19/2016     Spinal stenosis      Past Surgical History:   Procedure Laterality Date     APPENDECTOMY       COLONOSCOPY  8/9/2016    All was good     COLONOSCOPY WITH CO2 INSUFFLATION N/A 8/9/2016    Procedure: COLONOSCOPY WITH  CO2 INSUFFLATION;  Surgeon: Evan Brown MD;  Location: [facility] OR     LAPAROSCOPIC PROSTATECTOMY, LYMPHADENECTOMY, COMBINED  3/20/15    robot assisted, Dr. Anton Alonso     REPAIR PTOSIS       REPAIR PTOSIS BILATERAL  11/23/2012    Procedure: REPAIR PTOSIS BILATERAL;  BILATERAL UPPER LID MECHANICAL PTOSIS REPAIR AND BILATERAL BROWN PTOSIS;  Surgeon: Lowell Wood MD;  Location: [facility]     REPAIR PTOSIS BROW BILATERAL  11/23/2012    Procedure: REPAIR PTOSIS BROW BILATERAL;;  Surgeon: Lowell Wood MD;  Location: [facility]     VASECTOMY         Social History:   reports that  has never smoked. he has never used smokeless tobacco. He reports that he drinks alcohol. He reports that he does not use drugs.    Family History:  Family History   Problem Relation Age of Onset     Hypertension Mother      Diabetes Mother      Hypertension Father      Prostate Cancer Father      Cancer Father      Hypertension Maternal Grandmother      Hypertension Maternal Grandfather      Hypertension Paternal Grandmother      Hypertension Paternal Grandfather      Hypertension Brother      Prostate Cancer Brother      Cancer Brother      Prostate Cancer Brother      Cerebrovascular Disease No family hx of      Thyroid Disease No family hx of      Glaucoma No family hx of      Macular Degeneration No family hx of        Medications:  Current Outpatient Medications   Medication Sig Dispense Refill     amLODIPine (NORVASC) 10 MG tablet Take 1 tablet (10 mg) by mouth daily for blood pressure. 90 tablet 1     Cholecalciferol (VITAMIN D3 PO) Take 2,000 Units by mouth daily       hydrochlorothiazide (HYDRODIURIL) 25 MG tablet Take 1 tablet (25 mg) by mouth daily for blood pressure. 90 tablet 1     methylcellulose, laxative, (CITRUCEL) POWD Take by mouth daily       potassium chloride (K-TAB,KLOR-CON) 10 MEQ tablet Take 1 tablet (10 mEq) by mouth daily 90 tablet 1     simvastatin (ZOCOR) 20 MG tablet Take 1 tablet (20 mg) by  mouth every evening for cholesterol. 90 tablet 1       Allergies   Allergen Reactions     Lisinopril Hives, Swelling and Other (See Comments)     Angioedema?     Penicillins Hives and Swelling       Review of Systems:  -Constitutional: The patient denies fatigue, fevers, chills, unintended weight loss, and night sweats.  -Skin: As above in HPI. No additional skin concerns.    Physical exam:  Vitals: There were no vitals taken for this visit.  GEN: This is a well developed, well-nourished male in no acute distress, in a pleasant mood.    SKIN: Focused exam of face, right shin, left flank examined.   - Depigmented macule on the right shin  - 1.8 cm pigmented plaque on the right temporal hairline  - There is a waxy stuck on tan to brown papule on the left cheek.  - Pigmented macules left flank both 5 mm  -No other lesions of concern on areas examined.      Impression/Plan:    1. Pigmented macules left flank both 5 mm adjacent to one another- consistent with benign nevi but given close proximity-unchanged.     Photodocumentation obtained - we will continue to monitor.   2. Depigmented macule, right shin - Woods lamp positive, consistent with vitiligo. Pt reports he has had this for years.     Labs today - TSH    We will monitor for any changes. He declines biopsy but lesion has not changed.     3. 1.8 cm pigmented plaque on the right temporal hairline. NUB. Differential includes SK vs nevus vs other. Reviewed he will have hair loss    Shave biopsy:  After discussion of benefits and risks including but not limited to bleeding/bruising, pain/swelling, infection, scar, incomplete removal, nerve damage/numbness, recurrence, and non-diagnostic biopsy, written consent, verbal consent and photographs were obtained. Time-out was performed. The area was cleaned with isopropyl alcohol. 0.5mL of 1% lidocaine with epinephrine was injected to obtain adequate anesthesia of the lesion on the right temporal hairline. A shave biopsy was  performed. Hemostasis was achieved with Hyfrecator. Vaseline and a sterile dressing were applied. The patient tolerated the procedure and no complications were noted. The patient was provided with verbal and written post care instructions.     4. SK, left cheek. Hx of catching on razor when shaving.   Cryotherapy procedure note: After verbal consent and discussion of risks and benefits including but no limited to dyspigmentation/scar, blister, and pain, 1 was(were) treated with 1-2mm freeze border for 2 cycles with liquid nitrogen. Post cryotherapy instructions were provided.      Follow up in  October for skin exam.     Staff Involved:  Scribe/Staff    Scribe Disclosure  I, Manish Martinez, am serving as a scribe to document services personally performed by Dr. Suzanna Elmore MD, based on data collection and the provider's statements to me.     Provider Disclosure:   The documentation recorded by the scribe accurately reflects the services I personally performed and the decisions made by me.    Suzanna Elmore MD    Department of Dermatology  [facility]: Phone: [phone], Fax:[phone]  [facility]: Phone: [phone], Fax: [phone]          Again, thank you for allowing me to participate in the care of your patient.        Sincerely,        Suzanna Elmore MD

## 2019-02-12 NOTE — NURSING NOTE
Erik Youngblood's goals for this visit include:   Chief Complaint   Patient presents with     RECHECK     Erik is returning for a spot check; one area on the right check and left temple        He requests these members of his care team be copied on today's visit information:     PCP: Chacho Garcia    Referring Provider:  No referring provider defined for this encounter.    There were no vitals taken for this visit.    Do you need any medication refills at today's visit? No  Carmen Montanez LPN

## 2019-02-15 LAB — COPATH REPORT: NORMAL

## 2019-03-26 DIAGNOSIS — E78.5 HYPERLIPIDEMIA LDL GOAL <130: ICD-10-CM

## 2019-03-26 DIAGNOSIS — I10 ESSENTIAL HYPERTENSION WITH GOAL BLOOD PRESSURE LESS THAN 140/90: ICD-10-CM

## 2019-03-26 NOTE — TELEPHONE ENCOUNTER
"Requested Prescriptions   Pending Prescriptions Disp Refills     simvastatin (ZOCOR) 20 MG tablet [Pharmacy Med Name: SIMVASTATIN 20MG TABS] 90 tablet 1        Last Written Prescription Date:  10/18/18  Last Fill Quantity: 90,  # refills: 1   Last Office Visit with [facility], [facility] or [facility] prescribing provider:  10/18/18   Future Office Visit:      Sig: TAKE ONE TABLET BY MOUTH EVERY EVENING FOR CHOLESTEROL    Statins Protocol Passed - 3/26/2019  4:09 PM       Passed - LDL on file in past 12 months    Recent Labs   Lab Test 10/18/18  1011   LDL 66            Passed - No abnormal creatine kinase in past 12 months    No lab results found.            Passed - Recent (12 mo) or future (30 days) visit within the authorizing provider's specialty    Patient had office visit in the last 12 months or has a visit in the next 30 days with authorizing provider or within the authorizing provider's specialty.  See \"Patient Info\" tab in inbasket, or \"Choose Columns\" in Meds & Orders section of the refill encounter.             Passed - Medication is active on med list       Passed - Patient is age 18 or older        amLODIPine (NORVASC) 10 MG tablet [Pharmacy Med Name: AMLODIPINE BESYLATE 10MG TABS] 90 tablet 1          Last Written Prescription Date:  10/18/18  Last Fill Quantity: 90,  # refills: 1   Last Office Visit with [facility], [facility] or [facility] prescribing provider:  10/18/18   Future Office Visit:      Sig: TAKE ONE TABLET BY MOUTH EVERY DAY FOR BLOOD PRESSURE    Calcium Channel Blockers Protocol  Failed - 3/26/2019  4:09 PM       Failed - Blood pressure under 140/90 in past 12 months    BP Readings from Last 3 Encounters:   11/01/18 157/84   10/18/18 162/80   03/22/18 138/78                Passed - Recent (12 mo) or future (30 days) visit within the authorizing provider's specialty    Patient had office visit in the last 12 months or has a visit in the next 30 days with authorizing provider or within the authorizing provider's " "specialty.  See \"Patient Info\" tab in inbasket, or \"Choose Columns\" in Meds & Orders section of the refill encounter.             Passed - Medication is active on med list       Passed - Patient is age 18 or older       Passed - Normal serum creatinine on file in past 12 months    Recent Labs   Lab Test 10/18/18  1011   CR 1.17                   Tucker Faarax  [facility]  "

## 2019-03-28 RX ORDER — AMLODIPINE BESYLATE 10 MG/1
10 TABLET ORAL DAILY
Qty: 30 TABLET | Refills: 0 | Status: SHIPPED | OUTPATIENT
Start: 2019-03-28 | End: 2019-05-30

## 2019-03-28 RX ORDER — SIMVASTATIN 20 MG
TABLET ORAL
Qty: 90 TABLET | Refills: 1 | Status: SHIPPED | OUTPATIENT
Start: 2019-03-28 | End: 2019-05-30

## 2019-03-28 NOTE — TELEPHONE ENCOUNTER
Prescription for Simvastatin approved per [facility] Refill Protocol.    Amlodipine is being filled for 1 time refill only due to:  due for BP check and chronic disease management apt, per provider plan from last OV. Elevated BP at last apt.   30 day will refill given. Needs apt for further refills.  [employer] message sent to patient notifying of will refill and reminder of needs apt for further refills.    Marimar Shah RN

## 2019-05-23 ENCOUNTER — RESEARCH ENCOUNTER (OUTPATIENT)
Dept: NEUROLOGY | Facility: CLINIC | Age: 59
End: 2019-05-23

## 2019-05-25 DIAGNOSIS — I10 ESSENTIAL HYPERTENSION WITH GOAL BLOOD PRESSURE LESS THAN 140/90: ICD-10-CM

## 2019-05-25 NOTE — TELEPHONE ENCOUNTER
"Requested Prescriptions   Pending Prescriptions Disp Refills     hydrochlorothiazide (HYDRODIURIL) 25 MG tablet [Pharmacy Med Name: HYDROCHLOROTHIAZIDE 25MG TABS]      Last Written Prescription Date:  10/18/18  Last Fill Quantity: 90,  # refills: 1   Last Office Visit with [facility] or [facility] prescribing provider:  10/18/18   Future Office Visit:    Next 5 appointments (look out 90 days)    May 30, 2019  9:20 AM CDT  Office Visit with IRMA Manrique CNP  [facility] [address] [place] [id number] [phone]          90 tablet 1     Sig: TAKE ONE TABLET BY MOUTH EVERY DAY FOR BLOOD PRESSURE       Diuretics (Including Combos) Protocol Failed - 5/25/2019  9:06 AM        Failed - Blood pressure under 140/90 in past 12 months     BP Readings from Last 3 Encounters:   11/01/18 157/84   10/18/18 162/80   03/22/18 138/78                 Passed - Recent (12 mo) or future (30 days) visit within the authorizing provider's specialty     Patient had office visit in the last 12 months or has a visit in the next 30 days with authorizing provider or within the authorizing provider's specialty.  See \"Patient Info\" tab in inbasket, or \"Choose Columns\" in Meds & Orders section of the refill encounter.              Passed - Medication is active on med list        Passed - Patient is age 18 or older        Passed - Normal serum creatinine on file in past 12 months     Recent Labs   Lab Test 10/18/18  1011   CR 1.17              Passed - Normal serum potassium on file in past 12 months     Recent Labs   Lab Test 10/18/18  1011   POTASSIUM 3.5                    Passed - Normal serum sodium on file in past 12 months     Recent Labs   Lab Test 10/18/18  1011                       Tucker Faarax  [facility]  "

## 2019-05-28 RX ORDER — HYDROCHLOROTHIAZIDE 25 MG/1
TABLET ORAL
Qty: 90 TABLET | Refills: 0 | Status: SHIPPED | OUTPATIENT
Start: 2019-05-28 | End: 2019-05-30

## 2019-05-28 NOTE — TELEPHONE ENCOUNTER
Routing refill request to provider for review/approval because:  A break in medication  Aviva Bob RN

## 2019-05-30 ENCOUNTER — OFFICE VISIT (OUTPATIENT)
Dept: FAMILY MEDICINE | Facility: CLINIC | Age: 59
End: 2019-05-30
Payer: COMMERCIAL

## 2019-05-30 ENCOUNTER — MYC MEDICAL ADVICE (OUTPATIENT)
Dept: FAMILY MEDICINE | Facility: CLINIC | Age: 59
End: 2019-05-30

## 2019-05-30 ENCOUNTER — TELEPHONE (OUTPATIENT)
Dept: PHARMACY | Facility: CLINIC | Age: 59
End: 2019-05-30

## 2019-05-30 VITALS
HEART RATE: 67 BPM | TEMPERATURE: 97.5 F | OXYGEN SATURATION: 99 % | BODY MASS INDEX: 30.07 KG/M2 | SYSTOLIC BLOOD PRESSURE: 139 MMHG | HEIGHT: 71 IN | WEIGHT: 214.8 LBS | DIASTOLIC BLOOD PRESSURE: 83 MMHG

## 2019-05-30 DIAGNOSIS — M65.30 TRIGGER FINGER, ACQUIRED: ICD-10-CM

## 2019-05-30 DIAGNOSIS — E78.5 HYPERLIPIDEMIA LDL GOAL <130: ICD-10-CM

## 2019-05-30 DIAGNOSIS — E66.3 OVERWEIGHT (BMI 25.0-29.9): ICD-10-CM

## 2019-05-30 DIAGNOSIS — I10 ESSENTIAL HYPERTENSION WITH GOAL BLOOD PRESSURE LESS THAN 140/90: Primary | ICD-10-CM

## 2019-05-30 LAB
ANION GAP SERPL CALCULATED.3IONS-SCNC: 10 MMOL/L (ref 3–14)
BUN SERPL-MCNC: 15 MG/DL (ref 7–30)
CALCIUM SERPL-MCNC: 9.5 MG/DL (ref 8.5–10.1)
CHLORIDE SERPL-SCNC: 99 MMOL/L (ref 94–109)
CO2 SERPL-SCNC: 28 MMOL/L (ref 20–32)
CREAT SERPL-MCNC: 1.09 MG/DL (ref 0.66–1.25)
CREAT UR-MCNC: 14 MG/DL
GFR SERPL CREATININE-BSD FRML MDRD: 73 ML/MIN/{1.73_M2}
GLUCOSE SERPL-MCNC: 93 MG/DL (ref 70–99)
MICROALBUMIN UR-MCNC: <5 MG/L
MICROALBUMIN/CREAT UR: NORMAL MG/G CR (ref 0–17)
POTASSIUM SERPL-SCNC: 3.4 MMOL/L (ref 3.4–5.3)
SODIUM SERPL-SCNC: 137 MMOL/L (ref 133–144)

## 2019-05-30 PROCEDURE — 80048 BASIC METABOLIC PNL TOTAL CA: CPT | Performed by: NURSE PRACTITIONER

## 2019-05-30 PROCEDURE — 99214 OFFICE O/P EST MOD 30 MIN: CPT | Performed by: NURSE PRACTITIONER

## 2019-05-30 PROCEDURE — 82043 UR ALBUMIN QUANTITATIVE: CPT | Performed by: NURSE PRACTITIONER

## 2019-05-30 PROCEDURE — 36415 COLL VENOUS BLD VENIPUNCTURE: CPT | Performed by: NURSE PRACTITIONER

## 2019-05-30 RX ORDER — HYDROCHLOROTHIAZIDE 25 MG/1
TABLET ORAL
Qty: 90 TABLET | Refills: 3 | Status: SHIPPED | OUTPATIENT
Start: 2019-05-30 | End: 2020-03-15

## 2019-05-30 RX ORDER — INFLUENZA A VIRUS A/VICTORIA/2454/2019 IVR-207 (H1N1) ANTIGEN (PROPIOLACTONE INACTIVATED), INFLUENZA A VIRUS A/HONG KONG/2671/2019 IVR-208 (H3N2) ANTIGEN (PROPIOLACTONE INACTIVATED), INFLUENZA B VIRUS B/VICTORIA/705/2018 BVR-11 ANTIGEN (PROPIOLACTONE INACTIVATED), INFLUENZA B VIRUS B/PHUKET/3073/2013 BVR-1B ANTIGEN (PROPIOLACTONE INACTIVATED) 15; 15; 15; 15 UG/.5ML; UG/.5ML; UG/.5ML; UG/.5ML
INJECTION, SUSPENSION INTRAMUSCULAR
Refills: 0 | COMMUNITY
Start: 2018-10-01 | End: 2021-09-15

## 2019-05-30 RX ORDER — AMLODIPINE BESYLATE 10 MG/1
10 TABLET ORAL DAILY
Qty: 30 TABLET | Refills: 0 | Status: SHIPPED | OUTPATIENT
Start: 2019-05-30 | End: 2019-06-05

## 2019-05-30 RX ORDER — SIMVASTATIN 20 MG
TABLET ORAL
Qty: 90 TABLET | Refills: 3 | Status: SHIPPED | OUTPATIENT
Start: 2019-05-30 | End: 2020-03-15

## 2019-05-30 ASSESSMENT — MIFFLIN-ST. JEOR: SCORE: 1806.46

## 2019-05-30 NOTE — TELEPHONE ENCOUNTER
Called patient to f/u on BP as part of mGlide study.       Home BP's are running 136/84 on average, with a max of 148/89 and a min of 128/80.  Patient is on amlodipine 10 mg daily and hydrochlorothiazide 25 mg daily - he is taking both medications in the morning. He reports excellent adherence, but is have some dizziness after taking his morning medications.        Patient is meeting average BP goal of <140/90, and has been at goal blood pressure of <140/90 since 5/27/2019.      I asked patient to continue to take his hydrochlorothiazide in the morning and to start taking his amlodipine in the evening - this should help his dizziness and improve blood pressure control.    Will f/u on patient BP readings in 1 week.       Ramona Aguilar, PharmD  Medication Therapy Management

## 2019-05-30 NOTE — PATIENT INSTRUCTIONS
At [facility], we strive to deliver an exceptional experience to you, every time we see you.  If you receive a survey in the mail, please send us back your thoughts. We really do value your feedback.    Your care team:                            Family Medicine Internal Medicine   MD Waylon Reyes MD Shantel Branch-Fleming, MD Katya Georgiev PA-C Megan Hill, APRN CNP    Dimitrios Andino MD Pediatrics   Austin Stephens, COLIN Newby, MD Mirela Sotelo APRN CNP   MD Amanda Baron MD Deborah Mielke, MD Yolanda Yepez, APRN [facility]      Clinic hours: Monday - Thursday 7 am-7 pm; Fridays 7 am-5 pm.   Urgent care: Monday - Friday 11 am-9 pm; Saturday and Sunday 9 am-5 pm.  Pharmacy : Monday -Thursday 8 am-8 pm; Friday 8 am-6 pm; Saturday and Sunday 9 am-5 pm.     Clinic: [phone]   Pharmacy: [phone]        Patient Education     Established High Blood Pressure    High blood pressure (hypertension) is a chronic disease. Often, healthcare providers don t know what causes it. But it can be caused by certain health conditions and medicines.  If you have high blood pressure, you may not have any symptoms. If you do have symptoms, they may include headache, dizziness, changes in your vision, chest pain, and shortness of breath. But even without symptoms, high blood pressure that s not treated raises your risk for heart attack, heart failure, and stroke. High blood pressure is a serious health risk and shouldn t be ignored.  Blood pressure measurements are given as 2 numbers. Systolic blood pressure is the upper number. This is the pressure when the heart contracts. Diastolic blood pressure is the lower number. This is the pressure when the heart relaxes between beats. You will see your blood pressure readings written together. For example, a person with a systolic pressure of 118 and a diastolic pressure of 78 will have 118/78 written in the  medical record.  Blood pressure is categorized as normal, elevated, or stage 1 or stage 2 high blood pressure:    Normal blood pressure is systolic of less than 120 and diastolic of less than 80 (120/80)    Elevated blood pressure is systolic of 120 to 129 and diastolic less than 80    Stage 1 high blood pressure is systolic is 130 to 139 or diastolic between 80 to 89    Stage 2 high blood pressure is when systolic is 140 or higher or the diastolic is 90 or higher  Home care  If you have high blood pressure, follow these home care guidelines to help lower your blood pressure. If you are taking medicines for high blood pressure, these methods may reduce or end your need for medicines in the future.    Start a weight-loss program if you are overweight.    Cut back on how much salt you get in your diet. Here s how to do this:  ? Don t eat foods that have a lot of salt. These include olives, pickles, smoked meats, and salted potato chips.  ? Don t add salt to your food at the table.  ? Use only small amounts of salt when cooking.    Start an exercise program. Talk with your healthcare provider about the type of exercise program that would be best for you. It doesn't have to be hard. Even brisk walking for 20 minutes 3 times a week is a good form of exercise.    Don t take medicines that stimulate the heart. This includes many over-the-counter cold and sinus decongestant pills and sprays, as well as diet pills. Check the warnings about high blood pressure on the label. Before buying any over-the-counter medicines or supplements, always ask the pharmacist about the product's potential interaction with your high blood pressure and your high blood pressure medicines.    Stimulants such as amphetamine or cocaine could be deadly for someone with high blood pressure. Never take these.    Limit how much caffeine you get in your diet. Switch to caffeine-free products.    Stop smoking. If you are a long-time smoker, this can be  hard. Talk to your healthcare provider about medicines and nicotine replacement options to help you. Also, enroll in a stop-smoking program to make it more likely that you will quit for good.    Learn how to handle stress. This is an important part of any program to lower blood pressure. Learn about relaxation methods like meditation, yoga, or biofeedback.    If your provider prescribed medicines, take them exactly as directed. Missing doses may cause your blood pressure get out of control.    If you miss a dose or doses, check with your healthcare provider or pharmacist about what to do.    Consider buying an automatic blood pressure machine to check your blood pressure at home. Ask your provider for a recommendation. You can get one of these at most pharmacies.     The American Heart Association recommends the following guidelines for home blood pressure monitoring:    Don't smoke or drink coffee for 30 minutes before taking your blood pressure.    Go to the bathroom before the test.    Relax for 5 minutes before taking the measurement.    Sit with your back supported (don't sit on a couch or soft chair); keep your feet on the floor uncrossed. Place your arm on a solid flat surface (like a table) with the upper part of the arm at heart level. Place the middle of the cuff directly above the bend of the elbow. Check the monitor's instruction manual for an illustration.    Take multiple readings. When you measure, take 2 to 3 readings one minute apart and record all of the results.    Take your blood pressure at the same time every day, or as your healthcare provider recommends.    Record the date, time, and blood pressure reading.    Take the record with you to your next medical appointment. If your blood pressure monitor has a built-in memory, simply take the monitor with you to your next appointment.    Call your provider if you have several high readings. Don't be frightened by a single high blood pressure  reading, but if you get several high readings, check in with your healthcare provider.    Note: When blood pressure reaches a systolic (top number) of 180 or higher OR diastolic (bottom number) of 110 or higher, seek emergency medical treatment.  Follow-up care  You will need to see your healthcare provider regularly. This is to check your blood pressure and to make changes to your medicines. Make a follow-up appointment as directed. Bring the record of your home blood pressure readings to the appointment.  When to seek medical advice  Call your healthcare provider right away if any of these occur:    Blood pressure reaches a systolic (upper number) of 180 or higher OR a diastolic (bottom number) of 110 or higher    Chest pain or shortness of breath    Severe headache    Throbbing or rushing sound in the ears    Nosebleed    Sudden severe pain in your belly (abdomen)    Extreme drowsiness, confusion, or fainting    Dizziness or spinning sensation (vertigo)    Weakness of an arm or leg or one side of the face    You have problems speaking or seeing   Date Last Reviewed: 12/1/2016 2000-2018 [employer]. [address] [place] [address], [place] [address]. All rights reserved. This information is not intended as a substitute for professional medical care. Always follow your healthcare professional's instructions.

## 2019-05-30 NOTE — PROGRESS NOTES
Subjective     Erik Youngblood is a 59 year old male who presents to clinic today for the following health issues:    HPI   Hyperlipidemia Follow-Up      Are you having any of the following symptoms? (Select all that apply)  No complaints of shortness of breath, chest pain or pressure.  No increased sweating or nausea with activity.  No left-sided neck or arm pain.  No complaints of pain in calves when walking 1-2 blocks.    Are you regularly taking any medication or supplement to lower your cholesterol?   Yes- simvastatin    Are you having muscle aches or other side effects that you think could be caused by your cholesterol lowering medication?  No      Hypertension Follow-up      Do you check your blood pressure regularly outside of the clinic? Yes     Are you following a low salt diet? Yes    Are your blood pressures ever more than 140 on the top number (systolic) OR more   than 90 on the bottom number (diastolic), for example 140/90? No    Amount of exercise or physical activity: 4-5 days/week for an average of 30-45 minutes    Problems taking medications regularly: No    Medication side effects: none    Diet: low salt      He also has concern for right 3rd finger triggering and requests referral.       Patient Active Problem List   Diagnosis     Overweight (BMI 25.0-29.9)     Essential hypertension with goal blood pressure less than 140/90     Hyperlipidemia LDL goal <130     Prostate cancer (H)     Status post prostatectomy     History of adenomatous polyp of colon     Advanced directives, counseling/discussion     Advance care planning     Spinal stenosis     Past Surgical History:   Procedure Laterality Date     APPENDECTOMY       COLONOSCOPY  8/9/2016    All was good     COLONOSCOPY WITH CO2 INSUFFLATION N/A 8/9/2016    Procedure: COLONOSCOPY WITH CO2 INSUFFLATION;  Surgeon: Evan Brown MD;  Location: [facility]     LAPAROSCOPIC PROSTATECTOMY, LYMPHADENECTOMY, COMBINED  3/20/15    robot assisted,   Anton Celeste     REPAIR PTOSIS       REPAIR PTOSIS BILATERAL  11/23/2012    Procedure: REPAIR PTOSIS BILATERAL;  BILATERAL UPPER LID MECHANICAL PTOSIS REPAIR AND BILATERAL BROWN PTOSIS;  Surgeon: Lowell Wood MD;  Location: [facility]     REPAIR PTOSIS BROW BILATERAL  11/23/2012    Procedure: REPAIR PTOSIS BROW BILATERAL;;  Surgeon: Lowell Wood MD;  Location: [facility]     VASECTOMY         Social History     Tobacco Use     Smoking status: Never Smoker     Smokeless tobacco: Never Used   Substance Use Topics     Alcohol use: Yes     Alcohol/week: 0.0 oz     Comment: rare     Family History   Problem Relation Age of Onset     Hypertension Mother      Diabetes Mother      Hypertension Father      Prostate Cancer Father      Cancer Father      Hypertension Maternal Grandmother      Hypertension Maternal Grandfather      Hypertension Paternal Grandmother      Hypertension Paternal Grandfather      Hypertension Brother      Prostate Cancer Brother      Cancer Brother      Prostate Cancer Brother      Cerebrovascular Disease No family hx of      Thyroid Disease No family hx of      Glaucoma No family hx of      Macular Degeneration No family hx of          Current Outpatient Medications   Medication Sig Dispense Refill     AFLURIA QUADRIVALENT 0.5 ML injection ADM 0.5ML IM UTD  0     amLODIPine (NORVASC) 10 MG tablet Take 1 tablet (10 mg) by mouth daily ++NEED APPOINTMENT FOR FURTHER REFILLS++ 30 tablet 0     Cholecalciferol (VITAMIN D3 PO) Take 2,000 Units by mouth daily       hydrochlorothiazide (HYDRODIURIL) 25 MG tablet TAKE ONE TABLET BY MOUTH EVERY DAY FOR BLOOD PRESSURE 90 tablet 0     methylcellulose, laxative, (CITRUCEL) POWD Take by mouth daily       potassium chloride (K-TAB,KLOR-CON) 10 MEQ tablet Take 1 tablet (10 mEq) by mouth daily 90 tablet 1     simvastatin (ZOCOR) 20 MG tablet TAKE ONE TABLET BY MOUTH EVERY EVENING FOR CHOLESTEROL 90 tablet 1     BP Readings from Last 3 Encounters:  "  05/30/19 139/83   11/01/18 157/84   10/18/18 162/80    Wt Readings from Last 3 Encounters:   05/30/19 97.4 kg (214 lb 12.8 oz)   10/18/18 96.3 kg (212 lb 3.2 oz)   03/22/18 93.4 kg (206 lb)            Reviewed and updated as needed this visit by Provider         Review of Systems   ROS COMP: Constitutional, HEENT, cardiovascular, pulmonary, gi and gu systems are negative, except as otherwise noted.      Objective    /83   Pulse 67   Temp 97.5  F (36.4  C) (Oral)   Ht 1.803 m (5' 11\")   Wt 97.4 kg (214 lb 12.8 oz)   SpO2 99%   BMI 29.96 kg/m    Body mass index is 29.96 kg/m .  Physical Exam   GENERAL: healthy, alert and no distress  EYES: Eyes grossly normal to inspection, PERRL and conjunctivae and sclerae normal  HENT: ear canals and TM's normal, nose and mouth without ulcers or lesions  NECK: no adenopathy, no asymmetry, masses, or scars and thyroid normal to palpation  RESP: lungs clear to auscultation - no rales, rhonchi or wheezes  CV: regular rate and rhythm, normal S1 S2, no S3 or S4, no murmur, click or rub, no peripheral edema and peripheral pulses strong  ABDOMEN: soft, nontender, no hepatosplenomegaly, no masses and bowel sounds normal  MS: Triggering right 3rd finger otherwise, no gross musculoskeletal defects noted, no edema  SKIN: no suspicious lesions or rashes  NEURO: Normal strength and tone, mentation intact and speech normal  BACK: no CVA tenderness, no paralumbar tenderness  PSYCH: mentation appears normal, affect normal/bright  LYMPH: normal ant/post cervical, supraclavicular nodes    Diagnostic Test Results:  Labs reviewed in Epic  Results for orders placed or performed in visit on 05/30/19 (from the past 24 hour(s))   Albumin Random Urine Quantitative with Creat Ratio   Result Value Ref Range    Creatinine Urine 14 mg/dL    Albumin Urine mg/L <5 mg/L    Albumin Urine mg/g Cr Unable to calculate due to low value 0 - 17 mg/g Cr           Assessment & Plan     1. Essential " "hypertension with goal blood pressure less than 140/90  Stable, continue current medications, reviewed low salt diet, he continues to be active. Return to clinic 6 months.  - hydrochlorothiazide (HYDRODIURIL) 25 MG tablet; TAKE ONE TABLET BY MOUTH EVERY DAY FOR BLOOD PRESSURE  Dispense: 90 tablet; Refill: 3  - amLODIPine (NORVASC) 10 MG tablet; Take 1 tablet (10 mg) by mouth daily ++NEED APPOINTMENT FOR FURTHER REFILLS++  Dispense: 30 tablet; Refill: 0  - Basic metabolic panel  (Ca, Cl, CO2, Creat, Gluc, K, Na, BUN)  - Albumin Random Urine Quantitative with Creat Ratio    2. Hyperlipidemia LDL goal <130  LDL well controlled.  Continue zocor, recheck lipids in October.  - simvastatin (ZOCOR) 20 MG tablet; TAKE ONE TABLET BY MOUTH EVERY EVENING FOR CHOLESTEROL  Dispense: 90 tablet; Refill: 3    3. Overweight (BMI 25.0-29.9)  Benefits of weight loss reviewed in detail, encouraged him to cut back on the carbohydrates in the diet, consume more fruits and vegetables, drink plenty of water, avoid fruit juices, sodas, get 150 min moderate exercise/week.  Recheck weight in 6 months.     4.  Trigger finger, acquired,  -Orthopedics  referral placed    BMI:   Estimated body mass index is 29.96 kg/m  as calculated from the following:    Height as of this encounter: 1.803 m (5' 11\").    Weight as of this encounter: 97.4 kg (214 lb 12.8 oz).   Weight management plan: Discussed healthy diet and exercise guidelines        Work on weight loss  Regular exercise  See Patient Instructions    Return in about 6 months (around 11/30/2019), or if symptoms worsen or fail to improve, for Routine Visit.    IRMA [facility]      "

## 2019-06-03 ENCOUNTER — TELEPHONE (OUTPATIENT)
Dept: PHARMACY | Facility: CLINIC | Age: 59
End: 2019-06-03

## 2019-06-03 NOTE — TELEPHONE ENCOUNTER
Sent patient a mPATH message to f/u on BP as part of mGlide study.       Home BP's are running 133/81 on average, with a max of 137/84 and a min of 128/80.  Patient is on amlodipine 10 mg daily in the evening and hydrochlorothiazide 25 mg daily in the morning.        Patient is meeting BP goal of <140/90.  No changes recommended today.      Will f/u on patient BP readings in 1 month.       Ramona Aguilar, PharmD  Medication Therapy Management

## 2019-06-05 ENCOUNTER — OFFICE VISIT (OUTPATIENT)
Dept: ORTHOPEDICS | Facility: CLINIC | Age: 59
End: 2019-06-05
Payer: COMMERCIAL

## 2019-06-05 VITALS
SYSTOLIC BLOOD PRESSURE: 128 MMHG | HEIGHT: 71 IN | WEIGHT: 214 LBS | BODY MASS INDEX: 29.96 KG/M2 | DIASTOLIC BLOOD PRESSURE: 80 MMHG

## 2019-06-05 DIAGNOSIS — I10 ESSENTIAL HYPERTENSION WITH GOAL BLOOD PRESSURE LESS THAN 140/90: ICD-10-CM

## 2019-06-05 DIAGNOSIS — M65.331 TRIGGER MIDDLE FINGER OF RIGHT HAND: Primary | ICD-10-CM

## 2019-06-05 DIAGNOSIS — M77.11 LATERAL EPICONDYLITIS OF RIGHT ELBOW: ICD-10-CM

## 2019-06-05 PROCEDURE — 20550 NJX 1 TENDON SHEATH/LIGAMENT: CPT | Mod: RT | Performed by: PEDIATRICS

## 2019-06-05 PROCEDURE — 99203 OFFICE O/P NEW LOW 30 MIN: CPT | Mod: 25 | Performed by: PEDIATRICS

## 2019-06-05 RX ORDER — LIDOCAINE HYDROCHLORIDE 10 MG/ML
0.5 INJECTION, SOLUTION INFILTRATION; PERINEURAL
Status: DISCONTINUED | OUTPATIENT
Start: 2019-06-05 | End: 2021-09-15

## 2019-06-05 RX ORDER — TRIAMCINOLONE ACETONIDE 40 MG/ML
20 INJECTION, SUSPENSION INTRA-ARTICULAR; INTRAMUSCULAR
Status: DISCONTINUED | OUTPATIENT
Start: 2019-06-05 | End: 2021-09-15

## 2019-06-05 RX ADMIN — LIDOCAINE HYDROCHLORIDE 0.5 ML: 10 INJECTION, SOLUTION INFILTRATION; PERINEURAL at 14:47

## 2019-06-05 RX ADMIN — TRIAMCINOLONE ACETONIDE 20 MG: 40 INJECTION, SUSPENSION INTRA-ARTICULAR; INTRAMUSCULAR at 14:47

## 2019-06-05 ASSESSMENT — MIFFLIN-ST. JEOR: SCORE: 1802.83

## 2019-06-05 NOTE — PATIENT INSTRUCTIONS
Injection for trigger finger  Splint for finger, overnight to start    Home exercises for the forearm and elbow

## 2019-06-05 NOTE — PROGRESS NOTES
Sports Medicine Clinic Visit    PCP: Chacho Garcia    Erik Youngblood is a 59 year old male who is seen  in consultation at the request of  Fauzia Yepez C.N.P. presenting with a right long finger trigger finger.  He noticed the locking about 6 weeks ago, worse in the morning.  Has used heat to help improve the pain.    He is right hand dominant     Injury: gradual onset   Pain constant. Now getting some radiating pain to lateral elbow.    No prior elbow/forearm issues.    **  Wears lateral elbow brace.      Location of Pain: right long finger   Duration of Pain: 6 week(s)  Rating of Pain at worst: 7/10  Rating of Pain Currently: 4/10  Symptoms are better with: Heat  Symptoms are worse with: flexion  Additional Features:   Positive: popping, catching and locking   Negative: swelling, bruising, grinding, instability, paresthesias, numbness, weakness, pain in other joints and systemic symptoms  Other evaluation and/or treatments so far consists of: Nothing  Prior History of related problems: denies     Social History: sales     Review of Systems  Musculoskeletal: as above  Remainder of review of systems is negative including constitutional, CV, pulmonary, GI, Skin and Neurologic except as noted in HPI or medical history.    Past Medical History:   Diagnosis Date     Adenomatous colon polyp      Allergic conjunctivitis      Cancer (H) 12/22/14    Prostate Cancer / Removed     Complication of anesthesia     nausea     Hypercholesterolemia      Hypertension      Obesity, Class I, BMI 30-34.9 5/19/2016     Spinal stenosis      Past Surgical History:   Procedure Laterality Date     APPENDECTOMY       COLONOSCOPY  8/9/2016    All was good     COLONOSCOPY WITH CO2 INSUFFLATION N/A 8/9/2016    Procedure: COLONOSCOPY WITH CO2 INSUFFLATION;  Surgeon: Evan Brown MD;  Location: [facility]     LAPAROSCOPIC PROSTATECTOMY, LYMPHADENECTOMY, COMBINED  3/20/15    robot assisted, Dr. Anton Alonso     REPAIR PTOSIS        REPAIR PTOSIS BILATERAL  11/23/2012    Procedure: REPAIR PTOSIS BILATERAL;  BILATERAL UPPER LID MECHANICAL PTOSIS REPAIR AND BILATERAL BROWN PTOSIS;  Surgeon: Lowell Wood MD;  Location: [facility]     REPAIR PTOSIS BROW BILATERAL  11/23/2012    Procedure: REPAIR PTOSIS BROW BILATERAL;;  Surgeon: Lowell Wood MD;  Location: [facility]     VASECTOMY       Family History   Problem Relation Age of Onset     Hypertension Mother      Diabetes Mother      Hypertension Father      Prostate Cancer Father      Cancer Father      Hypertension Maternal Grandmother      Hypertension Maternal Grandfather      Hypertension Paternal Grandmother      Hypertension Paternal Grandfather      Hypertension Brother      Prostate Cancer Brother      Cancer Brother      Prostate Cancer Brother      Cerebrovascular Disease No family hx of      Thyroid Disease No family hx of      Glaucoma No family hx of      Macular Degeneration No family hx of      Social History     Socioeconomic History     Marital status:      Spouse name: Not on file     Number of children: Not on file     Years of education: Not on file     Highest education level: Not on file   Occupational History     Not on file   Social Needs     Financial resource strain: Not on file     Food insecurity:     Worry: Not on file     Inability: Not on file     Transportation needs:     Medical: Not on file     Non-medical: Not on file   Tobacco Use     Smoking status: Never Smoker     Smokeless tobacco: Never Used   Substance and Sexual Activity     Alcohol use: Yes     Alcohol/week: 0.0 oz     Comment: rare     Drug use: No     Sexual activity: Yes     Partners: Female     Birth control/protection: Male Surgical, None   Lifestyle     Physical activity:     Days per week: Not on file     Minutes per session: Not on file     Stress: Not on file   Relationships     Social connections:     Talks on phone: Not on file     Gets together: Not on file     Attends  "Worship service: Not on file     Active member of club or organization: Not on file     Attends meetings of clubs or organizations: Not on file     Relationship status: Not on file     Intimate partner violence:     Fear of current or ex partner: Not on file     Emotionally abused: Not on file     Physically abused: Not on file     Forced sexual activity: Not on file   Other Topics Concern     Parent/sibling w/ CABG, MI or angioplasty before 65F 55M? No   Social History Narrative     Not on file       Objective  /80   Ht 1.803 m (5' 11\")   Wt 97.1 kg (214 lb)   BMI 29.85 kg/m      GENERAL APPEARANCE: healthy, alert and no distress   GAIT: NORMAL  SKIN: no suspicious lesions or rashes  NEURO: Normal strength and tone, mentation intact and speech normal  PSYCH:  mentation appears normal and affect normal/bright  HEENT: no scleral icterus  CV: no extremity edema  RESP: nonlabored breathing    Exam:      Elbow (right):  Mild lateral elbow swelling over lateral epicondyle, no ecchymosis noted.  Range of motion: Grossly full  Palpation: Tender over lateral epicondyle    Tender dorsal forearm proximally  Sensation: grossly intact throughout forearm, hand  Regional pulses normal. Capillary refill brisk.        Hand/wrist (right):    Inspection:  No deformity noted.  No swelling. No ecchymosis.    Motion:  Triggering of the long finger, mild pain    Strength:  Able to resist at long finger, no change in pain    Sensation:  Grossly intact.    Radial pulses normal, +2/4, capillary refill brisk.    Palpation:  Tender palmar base long finger  Nontender remainder      Skin:       well perfused       capillary refill brisk    Radiology:  None today.    Assessment:  1. Trigger middle finger of right hand    2. Lateral epicondylitis of right elbow         Plan:  Discussed the assessment with the patient.    Discussed with pt the cause of this including direct trauma and repetitive trauma or activity such as " gripping/grasping, also use of machinery/equipment.  Treatment includes avoiding the offending activity.  Use padded gloves for gripping/grasping activity.  Discussed benefits of splinting finger to prevent triggering and overuse.  Splint finger at night to start. Also discussed potential benefit of steroid injection to relieve pain and symptoms.  He desires injection, done today.  alumafoam splint provided.    Discussed the etiology of lateral epicondyle tendinosis. This condition is frequently caused by repetitive stress from gripping/grasping activities. Cause of this issue may include a change in level of activity that seems trivial to the patient. In this case it may be related to compensation for his trigger finger. Treatment options were discussed with the patient stressing the importance of activity modification to avoid painful activities. We discussed rehabilitation to improve the pain and overall function at the elbow; home exercises reviewed, OT offered. Use of device such as counter force bracing and/or wrist bracing was discussed; may use counterforce brace prn. Bracing only temporarily helps the symptoms.    Steroid injection of the right long finger trigger finger was performed today in clinic  Icing for the next 1-2 days may be helpful for pain. Injection may take 10-14 days to see the full effect.  Follow up: 2-3 weeks if not improving with above, sooner prn.  Questions answered. The patient indicates understanding of these issues and agrees with the plan.    Jacky Quinn DO, [facility]    CC: Fauzia Yepez        Hand / Upper Extremity Injection/Arthrocentesis: R long A1  Date/Time: 6/5/2019 2:47 PM  Performed by: Jacky Quinn DO  Authorized by: Jacky Quinn DO     Indications:  Tendon swelling  Needle Size:  25 G  Guidance: landmark    Approach:  Volar  Condition: trigger finger    Location:  Long finger    Site:  R long A1  Medications:  0.5 mL lidocaine 1 %; 20 mg  triamcinolone 40 MG/ML  Outcome:  Tolerated well, no immediate complications  Procedure discussed: discussed risks, benefits, and alternatives    Consent Given by:  Patient  Timeout: timeout called immediately prior to procedure    Prep: patient was prepped and draped in usual sterile fashion     Half of syringe injected            Disclaimer: This note consists of symbols derived from keyboarding, dictation and/or voice recognition software. As a result, there may be errors in the script that have gone undetected. Please consider this when interpreting information found in this chart.

## 2019-06-05 NOTE — LETTER
6/5/2019         RE: Erik Youngblood  [address]  [facility] [address]        Dear Colleague,    Thank you for referring your patient, Erik Youngblood, to the [facility] [place]. Please see a copy of my visit note below.    Sports Medicine Clinic Visit    PCP: Chacho Garcia    Erik Youngblood is a 59 year old male who is seen  in consultation at the request of  Fauzia Yepez C.N.P. presenting with a right long finger trigger finger.  He noticed the locking about 6 weeks ago, worse in the morning.  Has used heat to help improve the pain.    He is right hand dominant     Injury: gradual onset   Pain constant. Now getting some radiating pain to lateral elbow.    No prior elbow/forearm issues.    **  Wears lateral elbow brace.      Location of Pain: right long finger   Duration of Pain: 6 week(s)  Rating of Pain at worst: 7/10  Rating of Pain Currently: 4/10  Symptoms are better with: Heat  Symptoms are worse with: flexion  Additional Features:   Positive: popping, catching and locking   Negative: swelling, bruising, grinding, instability, paresthesias, numbness, weakness, pain in other joints and systemic symptoms  Other evaluation and/or treatments so far consists of: Nothing  Prior History of related problems: denies     Social History: sales     Review of Systems  Musculoskeletal: as above  Remainder of review of systems is negative including constitutional, CV, pulmonary, GI, Skin and Neurologic except as noted in HPI or medical history.    Past Medical History:   Diagnosis Date     Adenomatous colon polyp      Allergic conjunctivitis      Cancer (H) 12/22/14    Prostate Cancer / Removed     Complication of anesthesia     nausea     Hypercholesterolemia      Hypertension      Obesity, Class I, BMI 30-34.9 5/19/2016     Spinal stenosis      Past Surgical History:   Procedure Laterality Date     APPENDECTOMY       COLONOSCOPY  8/9/2016    All was good     COLONOSCOPY  WITH CO2 INSUFFLATION N/A 8/9/2016    Procedure: COLONOSCOPY WITH CO2 INSUFFLATION;  Surgeon: Evan Brown MD;  Location: [facility] OR     LAPAROSCOPIC PROSTATECTOMY, LYMPHADENECTOMY, COMBINED  3/20/15    robot assisted, Dr. Anton Alonso     REPAIR PTOSIS       REPAIR PTOSIS BILATERAL  11/23/2012    Procedure: REPAIR PTOSIS BILATERAL;  BILATERAL UPPER LID MECHANICAL PTOSIS REPAIR AND BILATERAL BROWN PTOSIS;  Surgeon: Lowell Wood MD;  Location:  [facility]     REPAIR PTOSIS BROW BILATERAL  11/23/2012    Procedure: REPAIR PTOSIS BROW BILATERAL;;  Surgeon: Lowell Wood MD;  Location:  [facility]     VASECTOMY       Family History   Problem Relation Age of Onset     Hypertension Mother      Diabetes Mother      Hypertension Father      Prostate Cancer Father      Cancer Father      Hypertension Maternal Grandmother      Hypertension Maternal Grandfather      Hypertension Paternal Grandmother      Hypertension Paternal Grandfather      Hypertension Brother      Prostate Cancer Brother      Cancer Brother      Prostate Cancer Brother      Cerebrovascular Disease No family hx of      Thyroid Disease No family hx of      Glaucoma No family hx of      Macular Degeneration No family hx of      Social History     Socioeconomic History     Marital status:      Spouse name: Not on file     Number of children: Not on file     Years of education: Not on file     Highest education level: Not on file   Occupational History     Not on file   Social Needs     Financial resource strain: Not on file     Food insecurity:     Worry: Not on file     Inability: Not on file     Transportation needs:     Medical: Not on file     Non-medical: Not on file   Tobacco Use     Smoking status: Never Smoker     Smokeless tobacco: Never Used   Substance and Sexual Activity     Alcohol use: Yes     Alcohol/week: 0.0 oz     Comment: rare     Drug use: No     Sexual activity: Yes     Partners: Female     Birth control/protection: Male  "Surgical, None   Lifestyle     Physical activity:     Days per week: Not on file     Minutes per session: Not on file     Stress: Not on file   Relationships     Social connections:     Talks on phone: Not on file     Gets together: Not on file     Attends Anabaptism service: Not on file     Active member of club or organization: Not on file     Attends meetings of clubs or organizations: Not on file     Relationship status: Not on file     Intimate partner violence:     Fear of current or ex partner: Not on file     Emotionally abused: Not on file     Physically abused: Not on file     Forced sexual activity: Not on file   Other Topics Concern     Parent/sibling w/ CABG, MI or angioplasty before 65F 55M? No   Social History Narrative     Not on file       Objective  /80   Ht 1.803 m (5' 11\")   Wt 97.1 kg (214 lb)   BMI 29.85 kg/m       GENERAL APPEARANCE: healthy, alert and no distress   GAIT: NORMAL  SKIN: no suspicious lesions or rashes  NEURO: Normal strength and tone, mentation intact and speech normal  PSYCH:  mentation appears normal and affect normal/bright  HEENT: no scleral icterus  CV: no extremity edema  RESP: nonlabored breathing    Exam:      Elbow (right):  Mild lateral elbow swelling over lateral epicondyle, no ecchymosis noted.  Range of motion: Grossly full  Palpation: Tender over lateral epicondyle    Tender dorsal forearm proximally  Sensation: grossly intact throughout forearm, hand  Regional pulses normal. Capillary refill brisk.        Hand/wrist (right):    Inspection:  No deformity noted.  No swelling. No ecchymosis.    Motion:  Triggering of the long finger, mild pain    Strength:  Able to resist at long finger, no change in pain    Sensation:  Grossly intact.    Radial pulses normal, +2/4, capillary refill brisk.    Palpation:  Tender palmar base long finger  Nontender remainder      Skin:       well perfused       capillary refill brisk    Radiology:  None " today.    Assessment:  1. Trigger middle finger of right hand    2. Lateral epicondylitis of right elbow         Plan:  Discussed the assessment with the patient.    Discussed with pt the cause of this including direct trauma and repetitive trauma or activity such as gripping/grasping, also use of machinery/equipment.  Treatment includes avoiding the offending activity.  Use padded gloves for gripping/grasping activity.  Discussed benefits of splinting finger to prevent triggering and overuse.  Splint finger at night to start. Also discussed potential benefit of steroid injection to relieve pain and symptoms.  He desires injection, done today.  alumafoam splint provided.    Discussed the etiology of lateral epicondyle tendinosis. This condition is frequently caused by repetitive stress from gripping/grasping activities. Cause of this issue may include a change in level of activity that seems trivial to the patient. In this case it may be related to compensation for his trigger finger. Treatment options were discussed with the patient stressing the importance of activity modification to avoid painful activities. We discussed rehabilitation to improve the pain and overall function at the elbow; home exercises reviewed, OT offered. Use of device such as counter force bracing and/or wrist bracing was discussed; may use counterforce brace prn. Bracing only temporarily helps the symptoms.    Steroid injection of the right long finger trigger finger was performed today in clinic  Icing for the next 1-2 days may be helpful for pain. Injection may take 10-14 days to see the full effect.  Follow up: 2-3 weeks if not improving with above, sooner prn.  Questions answered. The patient indicates understanding of these issues and agrees with the plan.    Jacky Quinn DO, CARLOS ENRIQUE    CC: Fauzia Yepez        Hand / Upper Extremity Injection/Arthrocentesis: R long A1  Date/Time: 6/5/2019 2:47 PM  Performed by: Jacky Quinn  DO Yung  Authorized by: Jacky Quinn DO     Indications:  Tendon swelling  Needle Size:  25 G  Guidance: landmark    Approach:  Volar  Condition: trigger finger    Location:  Long finger    Site:  R long A1  Medications:  0.5 mL lidocaine 1 %; 20 mg triamcinolone 40 MG/ML  Outcome:  Tolerated well, no immediate complications  Procedure discussed: discussed risks, benefits, and alternatives    Consent Given by:  Patient  Timeout: timeout called immediately prior to procedure    Prep: patient was prepped and draped in usual sterile fashion     Half of syringe injected            Disclaimer: This note consists of symbols derived from keyboarding, dictation and/or voice recognition software. As a result, there may be errors in the script that have gone undetected. Please consider this when interpreting information found in this chart.          Again, thank you for allowing me to participate in the care of your patient.        Sincerely,        Jacky Quinn DO

## 2019-06-05 NOTE — NURSING NOTE
Patient was given home exercise program at the direction LEAH Avery  that included the following exercise(s) :    Exercise(s) wrist flexion and extension stretching, prayer stretch, extended arm stretch     Repititions: hold 10-20 sec     Times per day: 2-3    Patient demonstrated understanding of and the ability to perform these exercises. Patient was seen for 3 minutes to provide this home exercise program. Patient was directed to discontinue any exercises that cause pain, and to call the clinic if any questions.    Alumafoam splint fitted for patient to use post trigger finger injection.

## 2019-06-10 RX ORDER — AMLODIPINE BESYLATE 10 MG/1
TABLET ORAL
Qty: 30 TABLET | Refills: 0 | Status: SHIPPED | OUTPATIENT
Start: 2019-06-10 | End: 2019-06-20

## 2019-06-10 NOTE — TELEPHONE ENCOUNTER
Prescription approved per [facility] Refill Protocol.  Aviva Bob RN

## 2019-06-18 ENCOUNTER — MYC MEDICAL ADVICE (OUTPATIENT)
Dept: FAMILY MEDICINE | Facility: CLINIC | Age: 59
End: 2019-06-18

## 2019-06-18 DIAGNOSIS — I10 ESSENTIAL HYPERTENSION WITH GOAL BLOOD PRESSURE LESS THAN 140/90: ICD-10-CM

## 2019-06-20 RX ORDER — AMLODIPINE BESYLATE 10 MG/1
TABLET ORAL
Qty: 90 TABLET | Refills: 3 | Status: SHIPPED | OUTPATIENT
Start: 2019-06-20 | End: 2020-03-15

## 2019-06-20 NOTE — TELEPHONE ENCOUNTER
Provider, a 30-day will refill send for patient recently but patient is requesting a 90 day supply. Please review patient's comments in MyChart message and advise on greater dispense amount and sign if appropriate.     Amy Li RN, BSN

## 2019-07-01 ENCOUNTER — TELEPHONE (OUTPATIENT)
Dept: PHARMACY | Facility: CLINIC | Age: 59
End: 2019-07-01

## 2019-07-01 NOTE — TELEPHONE ENCOUNTER
Sent patient [employer] message to f/u on BP as part of mGlide study.       Home BP's are running 129/80 on average, with a max of 134/82 and a min of 125/78.  Patient is on amlodipine 10 mg daily and hydrochlorothiazide 25 mg daily.          Patient is meeting BP goal of <140/90. No changes recommended today.      Will f/u on patient BP readings in 1 month.       Ramona Aguilar, PharmD  Medication Therapy Management

## 2019-07-29 ENCOUNTER — TELEPHONE (OUTPATIENT)
Dept: PHARMACY | Facility: CLINIC | Age: 59
End: 2019-07-29

## 2019-07-29 NOTE — TELEPHONE ENCOUNTER
Called patient to f/u on BP as part of mGlide study.       Home BP's are running 131/80 on average, with a max of 145/89 and a min of 127/77.  Patient is on amlodipine 10 mg daily and hydrochlorothiazide 25 mg daily. He continues to take both medications in the morning and continues to experience occasional dizziness. He reports excellent adherence.        Patient is meeting average BP goal of <140/90, with 5/6 of his last blood pressures at goal. He was not able to read his blood pressure today, replaced the batteries and has not been able to figure out how to re-set up equipment. He has Jaelyn's number and is going to call her. I asked patient to start taking his amlodipine in the evening and to continue to take his hydrochlorothiazide in the morning.     Will f/u on patient BP readings in 1 week.       Ramona Aguilar, AndrésD

## 2019-08-07 ENCOUNTER — TELEPHONE (OUTPATIENT)
Dept: PHARMACY | Facility: CLINIC | Age: 59
End: 2019-08-07

## 2019-08-07 NOTE — TELEPHONE ENCOUNTER
Called patient to f/u on BP as part of mGlide study.       Home BP's are running 123/73 on average, with a max of 132/78 and a min of 113/69.  Patient is meeting average BP goal of <140/90.        Patient is on hydrochlorothiazide 25 mg & amlodipine 10mg daily.  He has reported some occasional mild dizziness;  Dr. Aguilar recommended last week that he move his amlodipine to evening to minimize this during the day.  He has not made this change yet, but plans to start that tomorrow.            Will f/u on patient BP readings & occasional dizziness in 1 week.    Brittaney Pickett, Pharm.D., BCPS

## 2019-08-15 ENCOUNTER — TELEPHONE (OUTPATIENT)
Dept: PHARMACY | Facility: CLINIC | Age: 59
End: 2019-08-15

## 2019-08-15 NOTE — TELEPHONE ENCOUNTER
Called patient to f/u on BP as part of mGlide study.       Home BP's are running 128/77 on average, with a max of 134/79 and a min of 122/73.  Patient is on amlodipine 10 mg daily and hydrochlorothiazide 25 mg daily. He moved his amlodipine to the evening and no longer is having problems with dizziness. He reports excellent adherence.        Patient is meeting average BP goal of <140/90. No changes recommended today.    Will f/u on patient BP readings in 1 month.       Ramona Aguilar, PharmD

## 2019-09-30 ENCOUNTER — TELEPHONE (OUTPATIENT)
Dept: PHARMACY | Facility: CLINIC | Age: 59
End: 2019-09-30

## 2019-09-30 NOTE — TELEPHONE ENCOUNTER
Called patient to follow up. Left a message - will also MyChart patient. He stopped transmitting blood pressures 9/20/19. His voice message says that he is on vacation from 9/25 until 10/8. I will follow up with him when he returns.    Ramona Aguilar, AndrésD

## 2019-10-18 ENCOUNTER — OFFICE VISIT (OUTPATIENT)
Dept: DERMATOLOGY | Facility: CLINIC | Age: 59
End: 2019-10-18
Payer: COMMERCIAL

## 2019-10-18 DIAGNOSIS — L82.1 SEBORRHEIC KERATOSES: ICD-10-CM

## 2019-10-18 DIAGNOSIS — Z12.83 SCREENING FOR SKIN CANCER: ICD-10-CM

## 2019-10-18 DIAGNOSIS — I10 ESSENTIAL HYPERTENSION WITH GOAL BLOOD PRESSURE LESS THAN 140/90: ICD-10-CM

## 2019-10-18 DIAGNOSIS — D22.9 MULTIPLE BENIGN NEVI: Primary | ICD-10-CM

## 2019-10-18 PROCEDURE — 99213 OFFICE O/P EST LOW 20 MIN: CPT | Performed by: DERMATOLOGY

## 2019-10-18 ASSESSMENT — PAIN SCALES - GENERAL: PAINLEVEL: NO PAIN (0)

## 2019-10-18 NOTE — NURSING NOTE
Erik Youngblood's goals for this visit include:   Chief Complaint   Patient presents with     Skin Check     areas of concern: under right eye       He requests these members of his care team be copied on today's visit information: Yes    PCP: Fauzia Yepez    Referring Provider:  No referring provider defined for this encounter.    There were no vitals taken for this visit.    Do you need any medication refills at today's visit? No    Amee Rabago LPN

## 2019-10-18 NOTE — PROGRESS NOTES
Visit Date:   10/18/2019      SUBJECTIVE:  Lester returns for followup.  He last saw Dr. Elmore several months ago.  He has numerous lesions on his chest and back, most of which have been seborrheic keratoses.  He did see Dr. Murphy in 2018 for a dysplastic nevus on the back.  That site is well-healed.  He has no particular concerns, but would like a skin check because of his numerous pigmented lesions.      OBJECTIVE:  Shows a healthy young man in no distress.  He is not aware of any lesions, though he has two on his right eye he would like treated if possible.  Exam shows 2 small seborrheic keratoses on the lower eyelid right and on the forehead right.  These were treated with liquid nitrogen.  Examination of his chest, back, face, hands, arms and upper body reveals numerous seborrheic keratoses probably admixed with some very tiny nevi.  I saw no worrisome lesions that would suggest atypical nevi or skin cancer.      ASSESSMENT:  No worrisome lesions.  Numerous seborrheic keratoses.  Cryotherapy to two seborrheic keratoses.      PLAN:  Reassured.  Advised that he watch out for melanoma which is often a flat macular lesion that grows slowly and silently.  We discussed other forms of skin cancer.      I suggested to Lester he return in 1 year, which is his request, because of his numerous lesions.  Meds and allergies reviewed.         STARLA MAK MD             D: 10/18/2019   T: 10/18/2019   MT: JOE      Name:     TIFFANIE MONDRAGON   MRN:      [id number]        Account:      [id number]   :      1960           Visit Date:   10/18/2019      Document: [id number]

## 2019-10-18 NOTE — LETTER
10/18/2019         RE: Tiffanie Mondragon  [address]  [facility] [address]        Dear Colleague,    Thank you for referring your patient, Tiffanie Mondragon, to the [facility]. Please see a copy of my visit note below.    Visit Date:   10/18/2019      SUBJECTIVE:  Lester returns for followup.  He last saw Dr. Elmore several months ago.  He has numerous lesions on his chest and back, most of which have been seborrheic keratoses.  He did see Dr. Murphy in 2018 for a dysplastic nevus on the back.  That site is well-healed.  He has no particular concerns, but would like a skin check because of his numerous pigmented lesions.      OBJECTIVE:  Shows a healthy young man in no distress.  He is not aware of any lesions, though he has two on his right eye he would like treated if possible.  Exam shows 2 small seborrheic keratoses on the lower eyelid right and on the forehead right.  These were treated with liquid nitrogen.  Examination of his chest, back, face, hands, arms and upper body reveals numerous seborrheic keratoses probably admixed with some very tiny nevi.  I saw no worrisome lesions that would suggest atypical nevi or skin cancer.      ASSESSMENT:  No worrisome lesions.  Numerous seborrheic keratoses.  Cryotherapy to two seborrheic keratoses.      PLAN:  Reassured.  Advised that he watch out for melanoma which is often a flat macular lesion that grows slowly and silently.  We discussed other forms of skin cancer.      I suggested to Lester he return in 1 year, which is his request, because of his numerous lesions.  Meds and allergies reviewed.         STARLA MAK MD             D: 10/18/2019   T: 10/18/2019   MT: JOE      Name:     TIFFANIE MONDRAGON   MRN:      [id number]        Account:      [id number]   :      1960           Visit Date:   10/18/2019      Document: [id number]        Again, thank you for allowing me to participate in the care of your patient.         Sincerely,        STARLA Sloan MD

## 2019-10-19 NOTE — TELEPHONE ENCOUNTER
"Requested Prescriptions   Pending Prescriptions Disp Refills     hydrochlorothiazide (HYDRODIURIL) 25 MG tablet [Pharmacy Med Name: HYDROCHLOROTHIAZIDE 25MG TABS]  Last Written Prescription Date:  5/30/19  Last Fill Quantity: 90,  # refills: 3   Last Office Visit with [facility] prescribing provider:  5/30/19   Future Office Visit:      90 tablet 0     Sig: TAKE ONE TABLET BY MOUTH EVERY DAY FOR BLOOD PRESSURE       Diuretics (Including Combos) Protocol Passed - 10/18/2019  5:45 PM        Passed - Blood pressure under 140/90 in past 12 months     BP Readings from Last 3 Encounters:   06/05/19 128/80   05/30/19 139/83   11/01/18 157/84                 Passed - Recent (12 mo) or future (30 days) visit within the authorizing provider's specialty     Patient has had an office visit with the authorizing provider or a provider within the authorizing providers department within the previous 12 mos or has a future within next 30 days. See \"Patient Info\" tab in inbasket, or \"Choose Columns\" in Meds & Orders section of the refill encounter.              Passed - Medication is active on med list        Passed - Patient is age 18 or older        Passed - Normal serum creatinine on file in past 12 months     Recent Labs   Lab Test 05/30/19  0947   CR 1.09              Passed - Normal serum potassium on file in past 12 months     Recent Labs   Lab Test 05/30/19  0947   POTASSIUM 3.4                    Passed - Normal serum sodium on file in past 12 months     Recent Labs   Lab Test 05/30/19  0947                   "

## 2019-10-22 RX ORDER — HYDROCHLOROTHIAZIDE 25 MG/1
TABLET ORAL
Qty: 90 TABLET | Refills: 0 | OUTPATIENT
Start: 2019-10-22

## 2019-10-31 ENCOUNTER — TELEPHONE (OUTPATIENT)
Dept: PHARMACY | Facility: CLINIC | Age: 59
End: 2019-10-31

## 2019-10-31 NOTE — TELEPHONE ENCOUNTER
Called patient to f/u on BP as part of mGlide study.      He has not been able to transmit blood pressures - I talked to Lester on 10/10/19, he had just gotten a new phone and needed to update his equipment. He has only had one reading since then on 10/19 blood pressure was 126/76. He states he received a call today and is planning on calling the person who is helping him with his blood pressure equipment back to get it fixed. He continues to take amlodipine 10 mg daily and hydrochlorothiazide 25 mg daily.    I will follow up with patient once he resumes transmitting his blood pressures.    Ramona Aguilar, PharmD

## 2019-11-25 ENCOUNTER — TELEPHONE (OUTPATIENT)
Dept: PHARMACY | Facility: CLINIC | Age: 59
End: 2019-11-25

## 2019-11-25 NOTE — TELEPHONE ENCOUNTER
Called patient for mGlide Hypertension Study follow up - patient is at the end of his 6 month lina. Patient states he talked with Jaelyn today about his 6 month visit.      Thanked patient for participating in the study. Reviewed with him that we will no longer be adjusting his medications as part of the study. However, I did inform patient that he is welcome to call me with medication questions.       Patient will continue to follow up with his PCP and has no further questions at this time.     Ramona Aguilar, PharmD

## 2019-12-02 ENCOUNTER — OFFICE VISIT (OUTPATIENT)
Dept: ORTHOPEDICS | Facility: CLINIC | Age: 59
End: 2019-12-02
Payer: COMMERCIAL

## 2019-12-02 VITALS
SYSTOLIC BLOOD PRESSURE: 134 MMHG | HEIGHT: 71 IN | BODY MASS INDEX: 29.68 KG/M2 | WEIGHT: 212 LBS | DIASTOLIC BLOOD PRESSURE: 82 MMHG

## 2019-12-02 DIAGNOSIS — M65.331 TRIGGER MIDDLE FINGER OF RIGHT HAND: Primary | ICD-10-CM

## 2019-12-02 PROCEDURE — 99213 OFFICE O/P EST LOW 20 MIN: CPT | Mod: 25 | Performed by: PEDIATRICS

## 2019-12-02 PROCEDURE — 20550 NJX 1 TENDON SHEATH/LIGAMENT: CPT | Mod: RT | Performed by: PEDIATRICS

## 2019-12-02 RX ORDER — TRIAMCINOLONE ACETONIDE 40 MG/ML
20 INJECTION, SUSPENSION INTRA-ARTICULAR; INTRAMUSCULAR
Status: DISCONTINUED | OUTPATIENT
Start: 2019-12-02 | End: 2021-09-15

## 2019-12-02 RX ORDER — LIDOCAINE HYDROCHLORIDE 10 MG/ML
0.5 INJECTION, SOLUTION INFILTRATION; PERINEURAL
Status: DISCONTINUED | OUTPATIENT
Start: 2019-12-02 | End: 2021-09-15

## 2019-12-02 RX ADMIN — LIDOCAINE HYDROCHLORIDE 0.5 ML: 10 INJECTION, SOLUTION INFILTRATION; PERINEURAL at 15:22

## 2019-12-02 RX ADMIN — TRIAMCINOLONE ACETONIDE 20 MG: 40 INJECTION, SUSPENSION INTRA-ARTICULAR; INTRAMUSCULAR at 15:22

## 2019-12-02 ASSESSMENT — MIFFLIN-ST. JEOR: SCORE: 1793.76

## 2019-12-02 NOTE — LETTER
12/2/2019         RE: Erik Youngblood  [address]  [facility] [address]        Dear Colleague,    Thank you for referring your patient, Erik Youngblood, to the [facility]. Please see a copy of my visit note below.    Sports Medicine Clinic Visit    PCP: Fauzia Yepez    Erik Youngblood is a 59 year old male who is seen in f/u up for Trigger middle finger of right hand. Since last visit on 6/5/2019 patient has begun to have pain in his right long finger again.  He does feel he had good relief from the injection.  Did notice some triggering again  About 1-2 weeks ago.  Feels that the weather may affected it.  Now has an increase in soreness with use of the hand and has trouble make a fist.     He would be interested in a repeat injection.      **  Patient has difficulty with right long finger flexion and extension. Finger has been triggering recently. No longer triggering today though, with stiffness.  No interval injury.  Still has splint from last visit.      Review of Systems  All other systems reviewed and are negative unless noted above.    Past Medical History:   Diagnosis Date     Adenomatous colon polyp      Allergic conjunctivitis      Cancer (H) 12/22/14    Prostate Cancer / Removed     Complication of anesthesia     nausea     Hypercholesterolemia      Hypertension      Obesity, Class I, BMI 30-34.9 5/19/2016     Spinal stenosis      Past Surgical History:   Procedure Laterality Date     APPENDECTOMY       COLONOSCOPY  8/9/2016    All was good     COLONOSCOPY WITH CO2 INSUFFLATION N/A 8/9/2016    Procedure: COLONOSCOPY WITH CO2 INSUFFLATION;  Surgeon: Evan Brown MD;  Location: [facility]     LAPAROSCOPIC PROSTATECTOMY, LYMPHADENECTOMY, COMBINED  3/20/15    robot assisted, Dr. Anton Alonso     REPAIR PTOSIS       REPAIR PTOSIS BILATERAL  11/23/2012    Procedure: REPAIR PTOSIS BILATERAL;  BILATERAL UPPER LID MECHANICAL PTOSIS REPAIR AND BILATERAL  "BROWN PTOSIS;  Surgeon: Lowell Wood MD;  Location: [facility]     REPAIR PTOSIS BROW BILATERAL  11/23/2012    Procedure: REPAIR PTOSIS BROW BILATERAL;;  Surgeon: Lowell Wood MD;  Location: [facility]     VASECTOMY     This document serves as a record of the services and decisions personally performed and made by DO CARLOS ENRIQUE Jones. It was created on his behalf by Demetra Solorzano, a trained medical scribe. The creation of this document is based the provider's statements to the medical scribe.    Scribe Demetra Solorzano 3:45 PM 12/2/2019      Objective  /82   Ht 1.803 m (5' 11\")   Wt 96.2 kg (212 lb)   BMI 29.57 kg/m       GENERAL APPEARANCE: healthy, alert and no distress   GAIT: NORMAL  SKIN: no suspicious lesions or rashes  NEURO: Normal strength and tone, mentation intact and speech normal  PSYCH:  mentation appears normal and affect normal/bright  HEENT: no scleral icterus  CV: distal perfusion intact  RESP: nonlabored breathing    Exam    Long Finger Exam:  ROM:      Composite flexion, lacking some motion (mild limitation) compared to left.     Tender:        MCP joint--palmar aspect    Long finger was not actively triggering during the exam    Radiology  None today      Assessment:  1. Trigger middle finger of right hand        Plan:  Discussed the assessment with the patient. Symptoms have returned after previously successful injection. Will repeat injection today, with short term overnight splinting. If not beneficial, or if helpful and then symptoms return in future, likely see ortho surgeon.  **    Discussed monitoring injury with symptom relief; patient preferred an injection  Support: Patient will continue overnight long finger splinting   Discussed occupational therapy, patient declined. May contact clinic if desired.  Discussed orthopedic surgery referral; patient may contact clinic if referral is desired  Steroid injection of the right long finger; A1 pulley was performed today in " clinic  Icing for the next 1-2 days may be helpful for pain. Injection may take 10-14 days to see the full effect.  Follow up: left open ended  Future Considerations: Orthopedic surgery referral, occupational therapy  Questions answered. The patient indicates understanding of these issues and agrees with the plan.    Jacky Quinn DO, CAQ        Hand / Upper Extremity Injection/Arthrocentesis: R long A1  Date/Time: 12/2/2019 3:22 PM  Performed by: Jakcy Quinn DO  Authorized by: Jacky Quinn DO     Indications:  Pain and tendon swelling  Needle Size:  25 G  Guidance: landmark    Approach:  Volar  Condition: trigger finger    Location:  Long finger    Site:  R long A1  Medications:  0.5 mL lidocaine 1 %; 20 mg triamcinolone 40 MG/ML  Outcome:  Tolerated well, no immediate complications  Procedure discussed: discussed risks, benefits, and alternatives    Consent Given by:  Patient  Timeout: timeout called immediately prior to procedure    Prep: patient was prepped and draped in usual sterile fashion              Disclaimer: This note consists of symbols derived from keyboarding, dictation and/or voice recognition software. As a result, there may be errors in the script that have gone undetected. Please consider this when interpreting information found in this chart.    The information in this document, created by a scribe for me, accurately reflects the services I personally performed and the decisions made by me. I have reviewed and approved this document for accuracy.       Again, thank you for allowing me to participate in the care of your patient.        Sincerely,        Jacky Quinn DO

## 2019-12-02 NOTE — PROGRESS NOTES
Sports Medicine Clinic Visit    PCP: Fauzia Yepez    Erik Youngblood is a 59 year old male who is seen in f/u up for Trigger middle finger of right hand. Since last visit on 6/5/2019 patient has begun to have pain in his right long finger again.  He does feel he had good relief from the injection.  Did notice some triggering again  About 1-2 weeks ago.  Feels that the weather may affected it.  Now has an increase in soreness with use of the hand and has trouble make a fist.     He would be interested in a repeat injection.      **  Patient has difficulty with right long finger flexion and extension. Finger has been triggering recently. No longer triggering today though, with stiffness.  No interval injury.  Still has splint from last visit.      Review of Systems  All other systems reviewed and are negative unless noted above.    Past Medical History:   Diagnosis Date     Adenomatous colon polyp      Allergic conjunctivitis      Cancer (H) 12/22/14    Prostate Cancer / Removed     Complication of anesthesia     nausea     Hypercholesterolemia      Hypertension      Obesity, Class I, BMI 30-34.9 5/19/2016     Spinal stenosis      Past Surgical History:   Procedure Laterality Date     APPENDECTOMY       COLONOSCOPY  8/9/2016    All was good     COLONOSCOPY WITH CO2 INSUFFLATION N/A 8/9/2016    Procedure: COLONOSCOPY WITH CO2 INSUFFLATION;  Surgeon: Evan Brown MD;  Location: [facility]     LAPAROSCOPIC PROSTATECTOMY, LYMPHADENECTOMY, COMBINED  3/20/15    robot assisted, Dr. Anton Alonso     REPAIR PTOSIS       REPAIR PTOSIS BILATERAL  11/23/2012    Procedure: REPAIR PTOSIS BILATERAL;  BILATERAL UPPER LID MECHANICAL PTOSIS REPAIR AND BILATERAL BROWN PTOSIS;  Surgeon: Lowell Wood MD;  Location: [facility]     REPAIR PTOSIS BROW BILATERAL  11/23/2012    Procedure: REPAIR PTOSIS BROW BILATERAL;;  Surgeon: Lowell Wood MD;  Location: [facility]     VASECTOMY     This document serves as a record of  "the services and decisions personally performed and made by DO CARLOS ENRIQUE Jones. It was created on his behalf by Demetra Solorzano, a trained medical scribe. The creation of this document is based the provider's statements to the medical scribe.    Dionna Solorzano 3:45 PM 12/2/2019      Objective  /82   Ht 1.803 m (5' 11\")   Wt 96.2 kg (212 lb)   BMI 29.57 kg/m      GENERAL APPEARANCE: healthy, alert and no distress   GAIT: NORMAL  SKIN: no suspicious lesions or rashes  NEURO: Normal strength and tone, mentation intact and speech normal  PSYCH:  mentation appears normal and affect normal/bright  HEENT: no scleral icterus  CV: distal perfusion intact  RESP: nonlabored breathing    Exam    Long Finger Exam:  ROM:      Composite flexion, lacking some motion (mild limitation) compared to left.     Tender:        MCP joint--palmar aspect    Long finger was not actively triggering during the exam    Radiology  None today      Assessment:  1. Trigger middle finger of right hand        Plan:  Discussed the assessment with the patient. Symptoms have returned after previously successful injection. Will repeat injection today, with short term overnight splinting. If not beneficial, or if helpful and then symptoms return in future, likely see ortho surgeon.  **    Discussed monitoring injury with symptom relief; patient preferred an injection  Support: Patient will continue overnight long finger splinting   Discussed occupational therapy, patient declined. May contact clinic if desired.  Discussed orthopedic surgery referral; patient may contact clinic if referral is desired  Steroid injection of the right long finger; A1 pulley was performed today in clinic  Icing for the next 1-2 days may be helpful for pain. Injection may take 10-14 days to see the full effect.  Follow up: left open ended  Future Considerations: Orthopedic surgery referral, occupational therapy  Questions answered. The patient indicates " understanding of these issues and agrees with the plan.    Jacky Quinn DO, CAQ        Hand / Upper Extremity Injection/Arthrocentesis: R long A1  Date/Time: 12/2/2019 3:22 PM  Performed by: Jacky Quinn DO  Authorized by: Jacky Quinn DO     Indications:  Pain and tendon swelling  Needle Size:  25 G  Guidance: landmark    Approach:  Volar  Condition: trigger finger    Location:  Long finger    Site:  R long A1  Medications:  0.5 mL lidocaine 1 %; 20 mg triamcinolone 40 MG/ML  Outcome:  Tolerated well, no immediate complications  Procedure discussed: discussed risks, benefits, and alternatives    Consent Given by:  Patient  Timeout: timeout called immediately prior to procedure    Prep: patient was prepped and draped in usual sterile fashion              Disclaimer: This note consists of symbols derived from keyboarding, dictation and/or voice recognition software. As a result, there may be errors in the script that have gone undetected. Please consider this when interpreting information found in this chart.    The information in this document, created by a scribe for me, accurately reflects the services I personally performed and the decisions made by me. I have reviewed and approved this document for accuracy.

## 2019-12-02 NOTE — PATIENT INSTRUCTIONS
Repeat steroid injection done today for trigger finger  If not improving with injection, or if symptoms recur despite injection, then likely referral to orthopedic surgeon next

## 2019-12-19 ENCOUNTER — OFFICE VISIT (OUTPATIENT)
Dept: FAMILY MEDICINE | Facility: CLINIC | Age: 59
End: 2019-12-19
Payer: COMMERCIAL

## 2019-12-19 VITALS
OXYGEN SATURATION: 100 % | WEIGHT: 213 LBS | SYSTOLIC BLOOD PRESSURE: 158 MMHG | HEART RATE: 75 BPM | HEIGHT: 71 IN | BODY MASS INDEX: 29.82 KG/M2 | TEMPERATURE: 98 F | DIASTOLIC BLOOD PRESSURE: 88 MMHG | RESPIRATION RATE: 18 BRPM

## 2019-12-19 DIAGNOSIS — E66.3 OVERWEIGHT (BMI 25.0-29.9): ICD-10-CM

## 2019-12-19 DIAGNOSIS — Z90.79 STATUS POST PROSTATECTOMY: ICD-10-CM

## 2019-12-19 DIAGNOSIS — E78.5 HYPERLIPIDEMIA LDL GOAL <130: ICD-10-CM

## 2019-12-19 DIAGNOSIS — I10 ESSENTIAL HYPERTENSION WITH GOAL BLOOD PRESSURE LESS THAN 140/90: Primary | ICD-10-CM

## 2019-12-19 LAB
ALT SERPL W P-5'-P-CCNC: 54 U/L (ref 0–70)
ANION GAP SERPL CALCULATED.3IONS-SCNC: 8 MMOL/L (ref 3–14)
AST SERPL W P-5'-P-CCNC: 47 U/L (ref 0–45)
BUN SERPL-MCNC: 14 MG/DL (ref 7–30)
CALCIUM SERPL-MCNC: 9.4 MG/DL (ref 8.5–10.1)
CHLORIDE SERPL-SCNC: 99 MMOL/L (ref 94–109)
CHOLEST SERPL-MCNC: 136 MG/DL
CO2 SERPL-SCNC: 31 MMOL/L (ref 20–32)
CREAT SERPL-MCNC: 1.15 MG/DL (ref 0.66–1.25)
GFR SERPL CREATININE-BSD FRML MDRD: 68 ML/MIN/{1.73_M2}
GLUCOSE SERPL-MCNC: 107 MG/DL (ref 70–99)
HDLC SERPL-MCNC: 62 MG/DL
LDLC SERPL CALC-MCNC: 58 MG/DL
NONHDLC SERPL-MCNC: 74 MG/DL
POTASSIUM SERPL-SCNC: 3.6 MMOL/L (ref 3.4–5.3)
PSA SERPL-ACNC: <0.01 UG/L (ref 0–4)
SODIUM SERPL-SCNC: 138 MMOL/L (ref 133–144)
TRIGL SERPL-MCNC: 80 MG/DL

## 2019-12-19 PROCEDURE — G0103 PSA SCREENING: HCPCS | Performed by: NURSE PRACTITIONER

## 2019-12-19 PROCEDURE — 84460 ALANINE AMINO (ALT) (SGPT): CPT | Performed by: NURSE PRACTITIONER

## 2019-12-19 PROCEDURE — 36415 COLL VENOUS BLD VENIPUNCTURE: CPT | Performed by: NURSE PRACTITIONER

## 2019-12-19 PROCEDURE — 99214 OFFICE O/P EST MOD 30 MIN: CPT | Performed by: NURSE PRACTITIONER

## 2019-12-19 PROCEDURE — 80048 BASIC METABOLIC PNL TOTAL CA: CPT | Performed by: NURSE PRACTITIONER

## 2019-12-19 PROCEDURE — 84450 TRANSFERASE (AST) (SGOT): CPT | Performed by: NURSE PRACTITIONER

## 2019-12-19 PROCEDURE — 80061 LIPID PANEL: CPT | Performed by: NURSE PRACTITIONER

## 2019-12-19 ASSESSMENT — PAIN SCALES - GENERAL: PAINLEVEL: NO PAIN (0)

## 2019-12-19 ASSESSMENT — MIFFLIN-ST. JEOR: SCORE: 1798.29

## 2019-12-19 NOTE — PATIENT INSTRUCTIONS
At [facility], we strive to deliver an exceptional experience to you, every time we see you.  If you receive a survey in the mail, please send us back your thoughts. We really do value your feedback.    Based on your medical history, these are the current health maintenance/preventive care services that you are due for (some may have been done at this visit.)  Health Maintenance Due   Topic Date Due     PREVENTIVE CARE VISIT  1960     ZOSTER IMMUNIZATION (1 of 2) 04/08/2009     EYE EXAM  04/23/2019         Suggested websites for health information:  Www.[facility].org : Up to date and easily searchable information on multiple topics.  Www.[employer].gov : medication info, interactive tutorials, watch real surgeries online  Www.familydoctor.org : good info from the Academy of Family Physicians  Www.cdc.gov : public health info, travel advisories, epidemics (H1N1)  Www.aap.org : children's health info, normal development, vaccinations  Www.health.[facility].[place].us : [place] dept of health, public health issues in [place], N1N1    Your care team:                            Family Medicine Internal Medicine   MD Waylon Reyes MD Shantel Branch-Fleming, MD Katya Georgiev PA-C Nam Ho, MD Pediatrics   COLIN Vu, MD Amanda Rizo CNP, MD Deborah Mielke, MD Kim Thein, APRMARIA VICTORIA [facility]      Clinic hours: Monday - Thursday 7 am-7 pm; Fridays 7 am-5 pm.   Urgent care: Monday - Friday 11 am-9 pm; Saturday and Sunday 9 am-5 pm.  Pharmacy : Monday -Thursday 8 am-8 pm; Friday 8 am-6 pm; Saturday and Sunday 9 am-5 pm.     Clinic: [phone]   Pharmacy: [phone]    Patient Education     Controlling High Blood Pressure  High blood pressure (hypertension) is often called the silent killer. This is because many people who have it don t know it. High blood pressure can raise your risk of heart attack, stroke, and heart failure.  Controlling your blood pressure can decrease your risk of these problems. Know your blood pressure and remember to check it regularly. Doing so can save your life.  Blood pressure measurements are given as 2 numbers. Systolic blood pressure is the upper number. This is the pressure when the heart contracts. Diastolic blood pressure is the lower number. This is the pressure when the heart relaxes between beats.  Blood pressure is categorized as normal, elevated, or stage 1 or stage 2 high blood pressure:    Normal blood pressure is systolic of less than 120 and diastolic of less than 80 (120/80)    Elevated blood pressure is systolic of 120 to 129 and diastolic less than 80    Stage 1 high blood pressure is systolic is 130 to 139 or diastolic between 80 to 89    Stage 2 high blood pressure is when systolic is 140 or higher or the diastolic is 90 or higher  Here are some things you can do to help control your blood pressure.    Choose heart-healthy foods    Select low-salt, low-fat foods. Limit sodium intake to 2,400 mg per day or the amount suggested by your healthcare provider.    Limit canned, dried, cured, packaged, and fast foods. These can contain a lot of salt.    Eat 8 to 10 servings of fruits and vegetables every day.    Choose lean meats, fish, or chicken.    Eat whole-grain pasta, brown rice, and beans.    Eat 2 to 3 servings of low-fat or fat-free dairy products.    Ask your doctor about the DASH eating plan. This plan helps reduce blood pressure.    When you go to a restaurant, ask that your meal be prepared with no added salt.  Maintain a healthy weight    Ask your healthcare provider how many calories to eat a day. Then stick to that number.    Ask your healthcare provider what weight range is healthiest for you. If you are overweight, a weight loss of only 3% to 5% of your body weight can help lower blood pressure. Generally, a good weight loss goal is to lose 10% of your body weight in a  year.    Limit snacks and sweets.    Get regular exercise.  Get up and get active    Choose activities you enjoy. Find ones you can do with friends or family. This includes bicycling, dancing, walking, and jogging.    Park farther away from building entrances.    Use stairs instead of the elevator.    When you can, walk or bike instead of driving.    [place] leaves, garden, or do household repairs.    Be active at a moderate to vigorous level of physical activity for at least 40 minutes for a minimum of 3 to 4 days a week.   Manage stress    Make time to relax and enjoy life. Find time to laugh.    Communicate your concerns with your loved ones and your healthcare provider.    Visit with family and friends, and keep up with hobbies.  Limit alcohol and quit smoking    Men should have no more than 2 drinks per day.    Women should have no more than 1 drink per day.    Talk with your healthcare provider about quitting smoking. Smoking significantly increases your risk for heart disease and stroke. Ask your healthcare provider about community smoking cessation programs and other options.  Medicines  If lifestyle changes aren t enough, your healthcare provider may prescribe high blood pressure medicine. Take all medicines as prescribed. If you have any questions about your medicines, ask your healthcare provider before stopping or changing them.   Date Last Reviewed: 4/27/2016 2000-2018 [employer]. [address] [facility], [place] [address]. All rights reserved. This information is not intended as a substitute for professional medical care. Always follow your healthcare professional's instructions.

## 2019-12-19 NOTE — PROGRESS NOTES
Subjective     Erik Youngblood is a 59 year old male who presents to clinic today for the following health issues:    HPI     Hyperlipidemia Follow-Up      Are you regularly taking any medication or supplement to lower your cholesterol?   Yes- Zocor    Are you having muscle aches or other side effects that you think could be caused by your cholesterol lowering medication?  No    Hypertension Follow-up      Do you check your blood pressure regularly outside of the clinic? Yes     Are you following a low salt diet? Yes    Are your blood pressures ever more than 140 on the top number (systolic) OR more   than 90 on the bottom number (diastolic), for example 140/90? Yes      How many servings of fruits and vegetables do you eat daily?  2-3    On average, how many sweetened beverages do you drink each day (Examples: soda, juice, sweet tea, etc.  Do NOT count diet or artificially sweetened beverages)?   0    How many days per week do you miss taking your medication? 0      Patient Active Problem List   Diagnosis     Overweight (BMI 25.0-29.9)     Essential hypertension with goal blood pressure less than 140/90     Hyperlipidemia LDL goal <130     Prostate cancer (H)     Status post prostatectomy     History of adenomatous polyp of colon     Advanced directives, counseling/discussion     Advance care planning     Spinal stenosis     Past Surgical History:   Procedure Laterality Date     APPENDECTOMY       COLONOSCOPY  8/9/2016    All was good     COLONOSCOPY WITH CO2 INSUFFLATION N/A 8/9/2016    Procedure: COLONOSCOPY WITH CO2 INSUFFLATION;  Surgeon: Evan Brown MD;  Location: [facility]     LAPAROSCOPIC PROSTATECTOMY, LYMPHADENECTOMY, COMBINED  3/20/15    robot assisted, Dr. Anton Alonso     REPAIR PTOSIS       REPAIR PTOSIS BILATERAL  11/23/2012    Procedure: REPAIR PTOSIS BILATERAL;  BILATERAL UPPER LID MECHANICAL PTOSIS REPAIR AND BILATERAL BROWN PTOSIS;  Surgeon: Lowell Wood MD;  Location: [facility]      REPAIR PTOSIS BROW BILATERAL  11/23/2012    Procedure: REPAIR PTOSIS BROW BILATERAL;;  Surgeon: Lowell Wood MD;  Location: [facility]     VASECTOMY         Social History     Tobacco Use     Smoking status: Never Smoker     Smokeless tobacco: Never Used   Substance Use Topics     Alcohol use: Yes     Alcohol/week: 0.0 standard drinks     Comment: rare     Family History   Problem Relation Age of Onset     Hypertension Mother      Diabetes Mother      Hypertension Father      Prostate Cancer Father      Cancer Father      Hypertension Maternal Grandmother      Hypertension Maternal Grandfather      Hypertension Paternal Grandmother      Hypertension Paternal Grandfather      Hypertension Brother      Prostate Cancer Brother      Cancer Brother      Prostate Cancer Brother      Cerebrovascular Disease No family hx of      Thyroid Disease No family hx of      Glaucoma No family hx of      Macular Degeneration No family hx of          Current Outpatient Medications   Medication Sig Dispense Refill     AFLURIA QUADRIVALENT 0.5 ML injection ADM 0.5ML IM UTD  0     amLODIPine (NORVASC) 10 MG tablet TAKE ONE TABLET BY MOUTH ONCE DAILY (NEED TO BE SEEN IN CLINIC FOR FURTHER REFILLS) 90 tablet 3     Cholecalciferol (VITAMIN D3 PO) Take 2,000 Units by mouth daily       hydrochlorothiazide (HYDRODIURIL) 25 MG tablet TAKE ONE TABLET BY MOUTH EVERY DAY FOR BLOOD PRESSURE 90 tablet 3     methylcellulose, laxative, (CITRUCEL) POWD Take by mouth daily       simvastatin (ZOCOR) 20 MG tablet TAKE ONE TABLET BY MOUTH EVERY EVENING FOR CHOLESTEROL 90 tablet 3     potassium chloride (K-TAB,KLOR-CON) 10 MEQ tablet Take 1 tablet (10 mEq) by mouth daily (Patient not taking: Reported on 12/19/2019) 90 tablet 1     BP Readings from Last 3 Encounters:   12/19/19 (!) 158/88   12/02/19 134/82   06/05/19 128/80    Wt Readings from Last 3 Encounters:   12/19/19 96.6 kg (213 lb)   12/02/19 96.2 kg (212 lb)   06/05/19 97.1 kg (214 lb)     "       Reviewed and updated as needed this visit by Provider         Review of Systems   ROS COMP: Constitutional, HEENT, cardiovascular, pulmonary, gi and gu systems are negative, except as otherwise noted.      Objective    BP (!) 158/88   Pulse 75   Temp 98  F (36.7  C) (Oral)   Resp 18   Ht 1.803 m (5' 11\")   Wt 96.6 kg (213 lb)   SpO2 100%   BMI 29.71 kg/m    Body mass index is 29.71 kg/m .  Physical Exam   GENERAL: healthy, alert and no distress  EYES: Eyes grossly normal to inspection, PERRL and conjunctivae and sclerae normal  HENT: ear canals and TM's normal, nose and mouth without ulcers or lesions  NECK: no adenopathy, no asymmetry, masses, or scars and thyroid normal to palpation  RESP: lungs clear to auscultation - no rales, rhonchi or wheezes  CV: regular rate and rhythm, normal S1 S2, no S3 or S4, no murmur, click or rub, no peripheral edema and peripheral pulses strong  ABDOMEN: soft, nontender, no hepatosplenomegaly, no masses and bowel sounds normal  MS: no gross musculoskeletal defects noted, no edema  SKIN: no suspicious lesions or rashes  NEURO: Normal strength and tone, mentation intact and speech normal  BACK: no CVA tenderness, no paralumbar tenderness  PSYCH: mentation appears normal, affect normal/bright  LYMPH: normal ant/post cervical, supraclavicular nodes    Diagnostic Test Results:  Labs reviewed in Epic  No results found for this or any previous visit (from the past 24 hour(s)).        Assessment & Plan     1. Essential hypertension with goal blood pressure less than 140/90  BP elevated today. He admits to anxiety when he comes in to the clinic,  Home BP's are in the 130/70 range.  Recheck 1 month and he'll check his BP at home and send in his results as well/.  - Basic metabolic panel  (Ca, Cl, CO2, Creat, Gluc, K, Na, BUN)    2. Overweight (BMI 25.0-29.9)  Benefits of weight loss reviewed in detail, encouraged him to cut back on the carbohydrates in the diet, consume more " "fruits and vegetables, drink plenty of water, avoid fruit juices, sodas, get 150 min moderate exercise/week.  Recheck weight in 6 months.      3. Status post prostatectomy  Patient requests PSA screen.  - PSA, screen    4. Hyperlipidemia LDL goal <130  Low chol diet, regular exercise, weight loss encouraged.Continue Zocor 20 mg daily  - Lipid Profile (Chol, Trig, HDL, LDL calc)  - AST  - ALT     BMI:   Estimated body mass index is 29.71 kg/m  as calculated from the following:    Height as of this encounter: 1.803 m (5' 11\").    Weight as of this encounter: 96.6 kg (213 lb).   Weight management plan: Discussed healthy diet and exercise guidelines        Work on weight loss  Regular exercise  See Patient Instructions    No follow-ups on file.    IRMA Joy [facility]      "

## 2020-03-15 ENCOUNTER — MYC REFILL (OUTPATIENT)
Dept: FAMILY MEDICINE | Facility: CLINIC | Age: 60
End: 2020-03-15

## 2020-03-15 DIAGNOSIS — E78.5 HYPERLIPIDEMIA LDL GOAL <130: ICD-10-CM

## 2020-03-15 DIAGNOSIS — I10 ESSENTIAL HYPERTENSION WITH GOAL BLOOD PRESSURE LESS THAN 140/90: ICD-10-CM

## 2020-03-16 NOTE — TELEPHONE ENCOUNTER
"Requested Prescriptions   Pending Prescriptions Disp Refills     simvastatin (ZOCOR) 20 MG tablet 90 tablet 3     Sig: TAKE ONE TABLET BY MOUTH EVERY EVENING FOR CHOLESTEROL       Last Written Prescription Date:  05/30/19  Last Fill Quantity: 90,  # refills: 3   Last office visit: 12/19/2019 with prescribing provider:     Future Office Visit:      Statins Protocol Passed - 3/15/2020  6:37 PM        Passed - LDL on file in past 12 months     Recent Labs   Lab Test 12/19/19  0930   LDL 58             Passed - No abnormal creatine kinase in past 12 months     No lab results found.             Passed - Recent (12 mo) or future (30 days) visit within the authorizing provider's specialty     Patient has had an office visit with the authorizing provider or a provider within the authorizing providers department within the previous 12 mos or has a future within next 30 days. See \"Patient Info\" tab in inbasket, or \"Choose Columns\" in Meds & Orders section of the refill encounter.              Passed - Medication is active on med list        Passed - Patient is age 18 or older           hydrochlorothiazide (HYDRODIURIL) 25 MG tablet 90 tablet 3     Sig: TAKE ONE TABLET BY MOUTH EVERY DAY FOR BLOOD PRESSURE       Last Written Prescription Date:  05/30/19  Last Fill Quantity: 90,  # refills: 3   Last office visit: 12/19/2019 with prescribing provider:     Future Office Visit:      Diuretics (Including Combos) Protocol Failed - 3/15/2020  6:37 PM        Failed - Blood pressure under 140/90 in past 12 months     BP Readings from Last 3 Encounters:   12/19/19 (!) 158/88   12/02/19 134/82   06/05/19 128/80                 Passed - Recent (12 mo) or future (30 days) visit within the authorizing provider's specialty     Patient has had an office visit with the authorizing provider or a provider within the authorizing providers department within the previous 12 mos or has a future within next 30 days. See \"Patient Info\" tab in " "inbasket, or \"Choose Columns\" in Meds & Orders section of the refill encounter.              Passed - Medication is active on med list        Passed - Patient is age 18 or older        Passed - Normal serum creatinine on file in past 12 months     Recent Labs   Lab Test 12/19/19  0930   CR 1.15              Passed - Normal serum potassium on file in past 12 months     Recent Labs   Lab Test 12/19/19  0930   POTASSIUM 3.6                    Passed - Normal serum sodium on file in past 12 months     Recent Labs   Lab Test 12/19/19  0930                    amLODIPine (NORVASC) 10 MG tablet 90 tablet 3     Sig: TAKE ONE TABLET BY MOUTH ONCE DAILY (NEED TO BE SEEN IN CLINIC FOR FURTHER REFILLS)       Last Written Prescription Date:  06/20/19  Last Fill Quantity: 90,  # refills: 3   Last office visit: 12/19/2019 with prescribing provider:  0  Future Office Visit:        Calcium Channel Blockers Protocol  Failed - 3/15/2020  6:37 PM        Failed - Blood pressure under 140/90 in past 12 months     BP Readings from Last 3 Encounters:   12/19/19 (!) 158/88   12/02/19 134/82   06/05/19 128/80                 Passed - Recent (12 mo) or future (30 days) visit within the authorizing provider's specialty     Patient has had an office visit with the authorizing provider or a provider within the authorizing providers department within the previous 12 mos or has a future within next 30 days. See \"Patient Info\" tab in inbasket, or \"Choose Columns\" in Meds & Orders section of the refill encounter.              Passed - Medication is active on med list        Passed - Patient is age 18 or older        Passed - Normal serum creatinine on file in past 12 months     Recent Labs   Lab Test 12/19/19  0930   CR 1.15       Ok to refill medication if creatinine is low               "

## 2020-03-18 RX ORDER — SIMVASTATIN 20 MG
TABLET ORAL
Qty: 90 TABLET | Refills: 0 | Status: SHIPPED | OUTPATIENT
Start: 2020-03-18 | End: 2020-10-08

## 2020-03-18 RX ORDER — AMLODIPINE BESYLATE 10 MG/1
TABLET ORAL
Qty: 90 TABLET | Refills: 0 | Status: SHIPPED | OUTPATIENT
Start: 2020-03-18 | End: 2020-10-08

## 2020-03-18 RX ORDER — HYDROCHLOROTHIAZIDE 25 MG/1
TABLET ORAL
Qty: 90 TABLET | Refills: 0 | Status: SHIPPED | OUTPATIENT
Start: 2020-03-18 | End: 2020-10-08

## 2020-03-18 NOTE — TELEPHONE ENCOUNTER
Refilled x 3 months. Patient to schedule back in 3 months for BP recheck, urine microalbumin.  Fauzia SOLIS, CNP

## 2020-03-18 NOTE — TELEPHONE ENCOUNTER
Called and spoke to the patient and gave him Yolanda's message, patient understands and will call back to schedule.  /Yohana Esquivel MA  [facility]  2nd Floor  Primary Care

## 2020-03-22 ENCOUNTER — HEALTH MAINTENANCE LETTER (OUTPATIENT)
Age: 60
End: 2020-03-22

## 2020-10-06 DIAGNOSIS — E78.5 HYPERLIPIDEMIA LDL GOAL <130: ICD-10-CM

## 2020-10-06 DIAGNOSIS — I10 ESSENTIAL HYPERTENSION WITH GOAL BLOOD PRESSURE LESS THAN 140/90: ICD-10-CM

## 2020-10-08 RX ORDER — AMLODIPINE BESYLATE 10 MG/1
TABLET ORAL
Qty: 90 TABLET | Refills: 0 | Status: SHIPPED | OUTPATIENT
Start: 2020-10-08 | End: 2021-01-07

## 2020-10-08 RX ORDER — HYDROCHLOROTHIAZIDE 25 MG/1
TABLET ORAL
Qty: 90 TABLET | Refills: 0 | Status: SHIPPED | OUTPATIENT
Start: 2020-10-08 | End: 2021-01-11

## 2020-10-08 RX ORDER — SIMVASTATIN 20 MG
TABLET ORAL
Qty: 90 TABLET | Refills: 0 | Status: SHIPPED | OUTPATIENT
Start: 2020-10-08 | End: 2021-01-11

## 2020-10-08 NOTE — TELEPHONE ENCOUNTER
"Form amlodipine and hydrochlorothiazide:  Routing refill request to provider for review/approval because:  Julia given x1 and patient did not follow up, please advise  BP is out of range.    For simvastatin:  Prescription approved per [facility] Refill Protocol. Patient is due for visit in December.      Requested Prescriptions   Pending Prescriptions Disp Refills     amLODIPine (NORVASC) 10 MG tablet 90 tablet 0     Sig: TAKE ONE TABLET BY MOUTH ONCE DAILY (NEED TO BE SEEN IN CLINIC FOR FURTHER REFILLS)       Calcium Channel Blockers Protocol  Failed - 10/8/2020  9:14 AM        Failed - Blood pressure under 140/90 in past 12 months     BP Readings from Last 3 Encounters:   12/19/19 (!) 158/88   12/02/19 134/82   06/05/19 128/80                 Passed - Recent (12 mo) or future (30 days) visit within the authorizing provider's specialty     Patient has had an office visit with the authorizing provider or a provider within the authorizing providers department within the previous 12 mos or has a future within next 30 days. See \"Patient Info\" tab in inbasket, or \"Choose Columns\" in Meds & Orders section of the refill encounter.              Passed - Medication is active on med list        Passed - Patient is age 18 or older        Passed - Normal serum creatinine on file in past 12 months     Recent Labs   Lab Test 12/19/19  0930   CR 1.15       Ok to refill medication if creatinine is low             hydrochlorothiazide (HYDRODIURIL) 25 MG tablet 90 tablet 0     Sig: TAKE ONE TABLET BY MOUTH EVERY DAY FOR BLOOD PRESSURE       Diuretics (Including Combos) Protocol Failed - 10/8/2020  9:14 AM        Failed - Blood pressure under 140/90 in past 12 months     BP Readings from Last 3 Encounters:   12/19/19 (!) 158/88   12/02/19 134/82   06/05/19 128/80                 Passed - Recent (12 mo) or future (30 days) visit within the authorizing provider's specialty     Patient has had an office visit with the authorizing provider or a " "provider within the authorizing providers department within the previous 12 mos or has a future within next 30 days. See \"Patient Info\" tab in inbasket, or \"Choose Columns\" in Meds & Orders section of the refill encounter.              Passed - Medication is active on med list        Passed - Patient is age 18 or older        Passed - Normal serum creatinine on file in past 12 months     Recent Labs   Lab Test 12/19/19  0930   CR 1.15              Passed - Normal serum potassium on file in past 12 months     Recent Labs   Lab Test 12/19/19  0930   POTASSIUM 3.6                    Passed - Normal serum sodium on file in past 12 months     Recent Labs   Lab Test 12/19/19  0930                    simvastatin (ZOCOR) 20 MG tablet 90 tablet 0     Sig: TAKE ONE TABLET BY MOUTH EVERY EVENING FOR CHOLESTEROL       Statins Protocol Passed - 10/8/2020  9:14 AM        Passed - LDL on file in past 12 months     Recent Labs   Lab Test 12/19/19  0930   LDL 58             Passed - No abnormal creatine kinase in past 12 months     No lab results found.             Passed - Recent (12 mo) or future (30 days) visit within the authorizing provider's specialty     Patient has had an office visit with the authorizing provider or a provider within the authorizing providers department within the previous 12 mos or has a future within next 30 days. See \"Patient Info\" tab in inbasket, or \"Choose Columns\" in Meds & Orders section of the refill encounter.              Passed - Medication is active on med list        Passed - Patient is age 18 or older           Triny Nye RN, BSN, PHN    "

## 2021-01-07 ENCOUNTER — TELEPHONE (OUTPATIENT)
Dept: FAMILY MEDICINE | Facility: CLINIC | Age: 61
End: 2021-01-07

## 2021-01-07 DIAGNOSIS — I10 ESSENTIAL HYPERTENSION WITH GOAL BLOOD PRESSURE LESS THAN 140/90: ICD-10-CM

## 2021-01-07 DIAGNOSIS — E78.5 HYPERLIPIDEMIA LDL GOAL <130: ICD-10-CM

## 2021-01-07 RX ORDER — AMLODIPINE BESYLATE 10 MG/1
TABLET ORAL
Qty: 90 TABLET | Refills: 0 | Status: SHIPPED | OUTPATIENT
Start: 2021-01-07 | End: 2021-01-13

## 2021-01-07 NOTE — TELEPHONE ENCOUNTER
Reason for Call:  Medication or medication refill:    Do you use a [facility]?  Name of the pharmacy and phone number for the current request:    [facility] - [place], [place] - [address] (Pharmacy) [phone]       Name of the medication requested: amLODIPine (NORVASC) 10 MG tablet  Other request: Patient will run out of medication prior to rescheduled virtual visit on 1/13, Pharmacy asked the provider push medicine through    Can we leave a detailed message on this number? YES    Phone number patient can be reached at: Cell number on file:    Telephone Information:   Mobile [phone]       Best Time: Anytime    Call taken on 1/7/2021 at 8:17 AM by Peter Dan

## 2021-01-07 NOTE — TELEPHONE ENCOUNTER
Medication is being filled for 1 time refill only due to:  Patient needs to be seen because it has been more than one year since last visit.     Patient is scheduled for a visit on 1/13/21.      Triny Nye RN, BSN, PHN

## 2021-01-11 RX ORDER — HYDROCHLOROTHIAZIDE 25 MG/1
TABLET ORAL
Qty: 90 TABLET | Refills: 0 | Status: SHIPPED | OUTPATIENT
Start: 2021-01-11 | End: 2021-01-13

## 2021-01-11 RX ORDER — SIMVASTATIN 20 MG
TABLET ORAL
Qty: 90 TABLET | Refills: 0 | Status: SHIPPED | OUTPATIENT
Start: 2021-01-11 | End: 2021-01-13

## 2021-01-13 ENCOUNTER — VIRTUAL VISIT (OUTPATIENT)
Dept: FAMILY MEDICINE | Facility: CLINIC | Age: 61
End: 2021-01-13
Payer: COMMERCIAL

## 2021-01-13 VITALS — WEIGHT: 209 LBS | SYSTOLIC BLOOD PRESSURE: 132 MMHG | DIASTOLIC BLOOD PRESSURE: 78 MMHG | BODY MASS INDEX: 29.15 KG/M2

## 2021-01-13 DIAGNOSIS — I10 ESSENTIAL HYPERTENSION WITH GOAL BLOOD PRESSURE LESS THAN 140/90: ICD-10-CM

## 2021-01-13 DIAGNOSIS — E87.6 HYPOKALEMIA: Primary | ICD-10-CM

## 2021-01-13 DIAGNOSIS — E78.5 HYPERLIPIDEMIA LDL GOAL <130: ICD-10-CM

## 2021-01-13 LAB
ALBUMIN SERPL-MCNC: 4.6 G/DL (ref 3.4–5)
ALP SERPL-CCNC: 71 U/L (ref 40–150)
ALT SERPL W P-5'-P-CCNC: 56 U/L (ref 0–70)
ANION GAP SERPL CALCULATED.3IONS-SCNC: 3 MMOL/L (ref 3–14)
AST SERPL W P-5'-P-CCNC: 39 U/L (ref 0–45)
BILIRUB SERPL-MCNC: 0.8 MG/DL (ref 0.2–1.3)
BUN SERPL-MCNC: 13 MG/DL (ref 7–30)
CALCIUM SERPL-MCNC: 9.6 MG/DL (ref 8.5–10.1)
CHLORIDE SERPL-SCNC: 101 MMOL/L (ref 94–109)
CHOLEST SERPL-MCNC: 122 MG/DL
CO2 SERPL-SCNC: 33 MMOL/L (ref 20–32)
CREAT SERPL-MCNC: 1.06 MG/DL (ref 0.66–1.25)
CREAT UR-MCNC: 20 MG/DL
GFR SERPL CREATININE-BSD FRML MDRD: 75 ML/MIN/{1.73_M2}
GLUCOSE SERPL-MCNC: 81 MG/DL (ref 70–99)
HDLC SERPL-MCNC: 54 MG/DL
LDLC SERPL CALC-MCNC: 48 MG/DL
MICROALBUMIN UR-MCNC: <5 MG/L
MICROALBUMIN/CREAT UR: NORMAL MG/G CR (ref 0–17)
NONHDLC SERPL-MCNC: 68 MG/DL
POTASSIUM SERPL-SCNC: 3.3 MMOL/L (ref 3.4–5.3)
PROT SERPL-MCNC: 8.1 G/DL (ref 6.8–8.8)
SODIUM SERPL-SCNC: 137 MMOL/L (ref 133–144)
TRIGL SERPL-MCNC: 102 MG/DL

## 2021-01-13 PROCEDURE — 82043 UR ALBUMIN QUANTITATIVE: CPT | Performed by: NURSE PRACTITIONER

## 2021-01-13 PROCEDURE — 80053 COMPREHEN METABOLIC PANEL: CPT | Performed by: NURSE PRACTITIONER

## 2021-01-13 PROCEDURE — 36415 COLL VENOUS BLD VENIPUNCTURE: CPT | Performed by: NURSE PRACTITIONER

## 2021-01-13 PROCEDURE — 99214 OFFICE O/P EST MOD 30 MIN: CPT | Mod: 95 | Performed by: NURSE PRACTITIONER

## 2021-01-13 PROCEDURE — 80061 LIPID PANEL: CPT | Performed by: NURSE PRACTITIONER

## 2021-01-13 RX ORDER — AMLODIPINE BESYLATE 10 MG/1
TABLET ORAL
Qty: 90 TABLET | Refills: 1 | Status: SHIPPED | OUTPATIENT
Start: 2021-01-13 | End: 2021-10-13

## 2021-01-13 RX ORDER — SIMVASTATIN 20 MG
TABLET ORAL
Qty: 90 TABLET | Refills: 1 | Status: SHIPPED | OUTPATIENT
Start: 2021-01-13 | End: 2021-10-13

## 2021-01-13 RX ORDER — HYDROCHLOROTHIAZIDE 25 MG/1
TABLET ORAL
Qty: 90 TABLET | Refills: 1 | Status: SHIPPED | OUTPATIENT
Start: 2021-01-13 | End: 2021-10-18

## 2021-01-13 NOTE — PROGRESS NOTES
Lester is a 60 year old who is being evaluated via a billable video visit.      How would you like to obtain your AVS? MyChart  If the video visit is dropped, the invitation should be resent by: Text to cell phone: .  Will anyone else be joining your video visit? No    Video Start Time: 9:09  Assessment & Plan     Essential hypertension with goal blood pressure less than 140/90  Refilled BP medications, home BP at goal, encouraged low salt diet, continued regular exercie (has been walking 6 miles/day)  - amLODIPine (NORVASC) 10 MG tablet  Dispense: 90 tablet; Refill: 1  - hydrochlorothiazide (HYDRODIURIL) 25 MG tablet  Dispense: 90 tablet; Refill: 1  - **Comprehensive metabolic panel FUTURE anytime  - Albumin Random Urine Quantitative with Creat Ratio    Hyperlipidemia LDL goal <130  Refilled Zocor, de for labs (ordered)  - simvastatin (ZOCOR) 20 MG tablet  Dispense: 90 tablet; Refill: 1  - Lipid panel reflex to direct LDL Non-fasting      Review of the result(s) of each unique test - ipid, CMP, microalb   28 minutes spent on the date of the encounter doing chart review, history and exam, documentation and further activities as noted above             Work on weight loss  Regular exercise    No follow-ups on file.    [facility]     Lester is a 60 year old who presents to clinic today for the following health issues  HPI       Hyperlipidemia Follow-Up      Are you regularly taking any medication or supplement to lower your cholesterol?   Yes- Zocor 20 mg daily    Are you having muscle aches or other side effects that you think could be caused by your cholesterol lowering medication?  No    Hypertension Follow-up      Do you check your blood pressure regularly outside of the clinic? Yes     Are you following a low salt diet? Yes    Are your blood pressures ever more than 140 on the top number (systolic) OR more   than 90 on the bottom number (diastolic),  for example 140/90? No      How many servings of fruits and vegetables do you eat daily?  2-3    On average, how many sweetened beverages do you drink each day (Examples: soda, juice, sweet tea, etc.  Do NOT count diet or artificially sweetened beverages)?   0    How many days per week do you exercise enough to make your heart beat faster? 5    How many minutes a day do you exercise enough to make your heart beat faster? 60 or more    How many days per week do you miss taking your medication? 0  Also wondering about getting COVID vaccine.  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    Review of Systems   Constitutional, HEENT, cardiovascular, pulmonary, gi and gu systems are negative, except as otherwise noted.      Objective           Vitals:  No vitals were obtained today due to virtual visit.    Physical Exam   GENERAL: Healthy, alert and no distress  EYES: Eyes grossly normal to inspection.  No discharge or erythema, or obvious scleral/conjunctival abnormalities.  HENT: Normal cephalic/atraumatic.  External ears, nose and mouth without ulcers or lesions.  No nasal drainage visible.  NECK: No asymmetry, visible masses or scars  RESP: No audible wheeze, cough, or visible cyanosis.  No visible retractions or increased work of breathing.    SKIN: Visible skin clear. No significant rash, abnormal pigmentation or lesions.  NEURO: Cranial nerves grossly intact.  Mentation and speech appropriate for age.  PSYCH: Mentation appears normal, affect normal/bright, judgement and insight intact, normal speech and appearance well-groomed.    No results found for any visits on 01/13/21.    I spent a total of 28 minutes face-to-face with Erik Youngblood during today's office visit.  Over 50% of this time was spent counseling the patient and/or coordinating care regarding HYPERTENSION, COVID vaccine availability, hyperlipidemia.  See note for details.         Video-Visit Details    Type of service:  Video Visit    Video End Time:9:33 AM    Originating Location (pt. Location): Home    Distant Location (provider location):  [facility]     Platform used for Video Visit: Statusly

## 2021-01-13 NOTE — PATIENT INSTRUCTIONS
Patient Education     Established High Blood Pressure    High blood pressure (hypertension) is a long-term (chronic) disease. Often healthcare providers don t know what causes it. But it can be caused by certain health conditions and medicines.  If you have high blood pressure, you may not have any symptoms. If you do have symptoms, they may include:    Headache    Dizziness    Changes in your vision    Chest pain    Shortness of breath  But even without symptoms, high blood pressure that s not treated raises your risk for heart attack, heart failure, kidney disease, and stroke. High blood pressure is a serious health risk and shouldn t be ignored.  Blood pressure measurements are given as 2 numbers. Systolic blood pressure is the upper number. This is the pressure when the heart contracts. Diastolic blood pressure is the lower number. This is the pressure when the heart relaxes between beats. You will see your blood pressure readings written together. For example, a person with a systolic pressure of 118 and a diastolic pressure of 78 will have 118/78 written in the medical record.  Blood pressure is classified as normal, raised (elevated) or stage 1 or stage 2 high blood pressure:    Normal blood pressure. Systolic of less than 120 and diastolic of less than 80 (120/80).    Elevated blood pressure. Systolic of 120 to 129 and diastolic less than 80.    Stage 1 high blood pressure. Systolic is 130 to 139 or diastolic between 80 to 89.    Stage 2 high blood pressure. Systolic is 140 or higher or the diastolic is 90 or higher.  Home care  If you have high blood pressure, follow these home care guidelines to help lower your blood pressure. If you are taking medicines for high blood pressure, these methods may reduce or end your need for medicines in the future.    Start a weight-loss program if you are overweight.    Cut back on how much salt you get in your diet. Here s how to do this:  ? Don t eat foods that have a  lot of salt. These include olives, pickles, smoked meats, and salted potato chips.  ? Don t add salt to your food at the table.  ? Use only small amounts of salt when cooking.    Start an exercise program. Talk with your healthcare provider about the type of exercise program that would be best for you. It doesn't have to be hard. Even brisk walking for 20 minutes 3 times a week is a good form of exercise.    Don t take medicines that stimulate the heart. This includes many over-the-counter cold and sinus decongestant pills and sprays, as well as diet pills. Check the warnings about high blood pressure on the label. Before buying any over-the-counter medicines or supplements, always ask the pharmacist about the product's possible interaction with your high blood pressure and your high blood pressure medicines.    Stimulants such as amphetamine or cocaine could be deadly for someone with high blood pressure. Never take these.    Limit how much caffeine you get in your diet. Switch to caffeine-free products.    Stop smoking. If you are a long-time smoker, this can be hard. Talk with your healthcare provider about medicines and nicotine replacement options to help you. Also join a stop-smoking program . This makes it more likely that you will quit for good.    Learn how to handle stress. This is an important part of any program to lower blood pressure. Learn about relaxation methods such as meditation, yoga, or biofeedback.    If your provider prescribed medicines, take them exactly as directed. Missing doses may cause your blood pressure get out of control.    If you miss a dose, check with your healthcare provider or pharmacist about what to do.    Think about buying an automatic blood pressure machine to check your blood pressure at home. Ask your provider for a recommendation. You can get one of these at most pharmacies.  Using a home blood pressure monitor  The American Heart Association advises the following  guidelines for home blood pressure monitoring:    Don't smoke or drink coffee for 30 minutes before taking your blood pressure.    Go to the bathroom before the test.    Relax for 5 minutes before taking the measurement.    Sit with your back supported (don't sit on a couch or soft chair). Keep your feet on the floor uncrossed. Place your arm on a solid flat surface (such as a table) with the upper part of the arm at heart level. Place the middle of the cuff directly above the bend of the elbow. Check the monitor's instruction manual for an illustration.    Take multiple readings. When you measure, take 2 to 3 readings one minute apart and record all of the results.    Take your blood pressure at the same time every day, or as your healthcare provider advises.    Record the date, time, and blood pressure reading.    Take the record with you to your next healthcare appointment. If your blood pressure monitor has a built-in memory, just take the monitor with you to your next appointment.    Call your provider if you have several high readings. Don't be frightened by one high blood pressure reading. But if you get a few high readings, check in with your healthcare provider.  Follow-up care  You will need to see your healthcare provider regularly. This is to check your blood pressure and to make changes to your medicines. Make a follow-up appointment as directed. Bring the record of your home blood pressure readings to the appointment.  Call 911  [id number] you have any of these:    Blood pressure of 180/120 or higher    Chest pain or shortness of breath    Weakness of an arm or leg or one side of the face    Problems speaking or seeing    When to get medical advice  Call your healthcare provider right away if any of these occur:    Severe headache    Throbbing or rushing sound in the ears    Nosebleed    Sudden severe pain in your belly (abdomen)    Extreme drowsiness, confusion, or fainting    Dizziness or spinning  feeling (vertigo)  [employer] last reviewed this educational content on 7/1/2019 2000-2020 [employer]. [address] [place] [address], [place] [address]. All rights reserved. This information is not intended as a substitute for professional medical care. Always follow your healthcare professional's instructions.

## 2021-01-14 ENCOUNTER — MYC MEDICAL ADVICE (OUTPATIENT)
Dept: FAMILY MEDICINE | Facility: CLINIC | Age: 61
End: 2021-01-14

## 2021-01-24 ENCOUNTER — MYC MEDICAL ADVICE (OUTPATIENT)
Dept: FAMILY MEDICINE | Facility: CLINIC | Age: 61
End: 2021-01-24

## 2021-02-03 ENCOUNTER — OFFICE VISIT (OUTPATIENT)
Dept: ORTHOPEDICS | Facility: CLINIC | Age: 61
End: 2021-02-03
Payer: COMMERCIAL

## 2021-02-03 VITALS — WEIGHT: 218 LBS | BODY MASS INDEX: 30.4 KG/M2 | SYSTOLIC BLOOD PRESSURE: 138 MMHG | DIASTOLIC BLOOD PRESSURE: 86 MMHG

## 2021-02-03 DIAGNOSIS — M65.331 TRIGGER MIDDLE FINGER OF RIGHT HAND: Primary | ICD-10-CM

## 2021-02-03 PROCEDURE — 99213 OFFICE O/P EST LOW 20 MIN: CPT | Mod: 25 | Performed by: PEDIATRICS

## 2021-02-03 PROCEDURE — 20550 NJX 1 TENDON SHEATH/LIGAMENT: CPT | Mod: RT | Performed by: PEDIATRICS

## 2021-02-03 RX ORDER — TRIAMCINOLONE ACETONIDE 40 MG/ML
20 INJECTION, SUSPENSION INTRA-ARTICULAR; INTRAMUSCULAR
Status: DISCONTINUED | OUTPATIENT
Start: 2021-02-03 | End: 2021-09-15

## 2021-02-03 RX ORDER — LIDOCAINE HYDROCHLORIDE 10 MG/ML
0.5 INJECTION, SOLUTION INFILTRATION; PERINEURAL
Status: DISCONTINUED | OUTPATIENT
Start: 2021-02-03 | End: 2021-09-15

## 2021-02-03 RX ADMIN — TRIAMCINOLONE ACETONIDE 20 MG: 40 INJECTION, SUSPENSION INTRA-ARTICULAR; INTRAMUSCULAR at 11:47

## 2021-02-03 RX ADMIN — LIDOCAINE HYDROCHLORIDE 0.5 ML: 10 INJECTION, SOLUTION INFILTRATION; PERINEURAL at 11:47

## 2021-02-03 NOTE — LETTER
"    2/3/2021         RE: Erik Youngblood  [address]  [facility] [address]        Dear Colleague,    Thank you for referring your patient, Erik Youngblood, to the [facility]. Please see a copy of my visit note below.    Sports Medicine Clinic Visit    PCP: Fauzia Yepez    Erik Youngblood is a 60 year old male who is seen in f/u up for Trigger middle finger of right hand. Since last visit on 12/2/2019, patient has had decreased pain in middle joint of middle finger of right hand for over a year now. Notes that the pain started back up the beginning of December 2020. Pain is at a 6/10 but more painful in the morning. Middle finger on right had feels \"stuck\" and pops. Patient notes that the pain is more localized to the joint and just proximal to the joint.  **        Review of Systems  All other systems reviewed and are negative unless noted above.    Past Medical History:   Diagnosis Date     Adenomatous colon polyp      Allergic conjunctivitis      Cancer (H) 12/22/14    Prostate Cancer / Removed     Complication of anesthesia     nausea     Hypercholesterolemia      Hypertension      Obesity, Class I, BMI 30-34.9 5/19/2016     Spinal stenosis      Past Surgical History:   Procedure Laterality Date     APPENDECTOMY       COLONOSCOPY  8/9/2016    All was good     COLONOSCOPY WITH CO2 INSUFFLATION N/A 8/9/2016    Procedure: COLONOSCOPY WITH CO2 INSUFFLATION;  Surgeon: Evan Brown MD;  Location: [facility]     LAPAROSCOPIC PROSTATECTOMY, LYMPHADENECTOMY, COMBINED  3/20/15    robot assisted, Dr. Anton Alonso     REPAIR PTOSIS       REPAIR PTOSIS BILATERAL  11/23/2012    Procedure: REPAIR PTOSIS BILATERAL;  BILATERAL UPPER LID MECHANICAL PTOSIS REPAIR AND BILATERAL BROWN PTOSIS;  Surgeon: Lowell Wood MD;  Location: [facility]     REPAIR PTOSIS BROW BILATERAL  11/23/2012    Procedure: REPAIR PTOSIS BROW BILATERAL;;  Surgeon: Lowell Wood MD;  " Location: [facility]     VASECTOMY       Family History   Problem Relation Age of Onset     Hypertension Mother      Diabetes Mother      Hypertension Father      Prostate Cancer Father      Cancer Father      Hypertension Maternal Grandmother      Hypertension Maternal Grandfather      Hypertension Paternal Grandmother      Hypertension Paternal Grandfather      Hypertension Brother      Prostate Cancer Brother      Cancer Brother      Prostate Cancer Brother      Cerebrovascular Disease No family hx of      Thyroid Disease No family hx of      Glaucoma No family hx of      Macular Degeneration No family hx of      Social History     Socioeconomic History     Marital status:      Spouse name: Not on file     Number of children: Not on file     Years of education: Not on file     Highest education level: Not on file   Occupational History     Not on file   Social Needs     Financial resource strain: Not on file     Food insecurity     Worry: Not on file     Inability: Not on file     Transportation needs     Medical: Not on file     Non-medical: Not on file   Tobacco Use     Smoking status: Never Smoker     Smokeless tobacco: Never Used   Substance and Sexual Activity     Alcohol use: Yes     Alcohol/week: 0.0 standard drinks     Comment: rare     Drug use: No     Sexual activity: Yes     Partners: Female     Birth control/protection: Male Surgical, None   Lifestyle     Physical activity     Days per week: Not on file     Minutes per session: Not on file     Stress: Not on file   Relationships     Social connections     Talks on phone: Not on file     Gets together: Not on file     Attends Christianity service: Not on file     Active member of club or organization: Not on file     Attends meetings of clubs or organizations: Not on file     Relationship status: Not on file     Intimate partner violence     Fear of current or ex partner: Not on file     Emotionally abused: Not on file     Physically abused: Not on file      Forced sexual activity: Not on file   Other Topics Concern     Parent/sibling w/ CABG, MI or angioplasty before 65F 55M? No   Social History Narrative     Not on file         Objective  /86 (BP Location: Right arm, Patient Position: Sitting, Cuff Size: Adult Regular)   Wt 98.9 kg (218 lb)   BMI 30.40 kg/m      GENERAL APPEARANCE: healthy, alert and no distress   GAIT: NORMAL  SKIN: no suspicious lesions or rashes  NEURO: Normal strength and tone, mentation intact and speech normal  PSYCH:  mentation appears normal and affect normal/bright  HEENT: no scleral icterus  CV: distal perfusion intact  RESP: nonlabored breathing      Exam  Right hand  No swelling or ecchymosis  Some triggering right long finger  Mild tenderness palmar base long finger      Radiology  None new today    Assessment:  1. Trigger middle finger of right hand        Plan:  Discussed the assessment with the patient.  Reviewed course and options. Good response to past injections, though we discussed pros/cons of repeating injections. Last > 1 year ago. Will repeat today, but we discussed if future recurrence, then would refer to ortho surgeon.  He inquired about potential for a clinic-based procedure, rather than through surgery center or hospital. Can certainly try to point him in that direction (e.g., Dr Black) if needed.  He has splint already, discussed again splint use.  Steroid injection of the right long finger A1 pulley was performed today in clinic  Icing for the next 1-2 days may be helpful for pain. Injection may take 10-14 days to see the full effect.  Follow up: will leave as needed.  Questions answered. Discussed signs and symptoms that may indicate more serious issues; the patient was instructed to seek appropriate care if noted. Lester indicates understanding of these issues and agrees with the plan.      Jacky Quinn DO, CAQ          Hand / Upper Extremity Injection/Arthrocentesis: R long A1    Date/Time: 2/3/2021  11:47 AM  Performed by: Jacky Quinn DO  Authorized by: Jacky Quinn DO     Indications:  Pain  Needle Size:  25 G  Guidance: landmark    Approach:  Volar  Condition: trigger finger    Location:  Long finger    Site:  R long A1  Medications:  20 mg triamcinolone 40 MG/ML; 0.5 mL lidocaine 1 %  Outcome:  Tolerated well, no immediate complications  Procedure discussed: discussed risks, benefits, and alternatives    Consent Given by:  Patient  Timeout: timeout called immediately prior to procedure    Prep: patient was prepped and draped in usual sterile fashion     Half volume injected        Patient Instructions       Injection Discharge Instructions      You may shower, however avoid swimming, tub baths or hot tubs for 24 hours following your procedure    You may have a mild to moderate increase in pain for several days following the injection.    It may take up to 14 days for the steroid medication to start working although you may feel the effect as early as a few days after the procedure.    You may use ice packs for 10-15 minutes, 3 to 4 times a day at the injection site for comfort    You may use anti-inflammatory medications (such as Ibuprofen or Aleve or Advil) if you are able to take safely, or acetaminophen (Tylenol) for pain control if necessary    If you were fasting, you may resume your normal diet and medications after the procedure    If you have diabetes, check your blood sugar more frequently than usual as your blood sugar may be higher than normal for 10-14 days following a steroid injection. Contact your doctor who manages your diabetes if your blood sugar is higher than usual      If you experience any of the following, call the clinic @ [phone]  - Fever over 100 degree F  - Swelling, bleeding, redness, drainage, warmth at the injection site  - New or worsening pain          Note: Time spent in one-on-one evaluation and discussion with the patient regarding the nature of  problem, course, prior treatments, and therapeutic options, along with review of medical record (including outside sources/documentation), and completing documentation: 21 minutes.      This note consists of symbols derived from keyboarding, dictation and/or voice recognition software. As a result, there may be errors in the script that have gone undetected. Please consider this when interpreting information found in this chart.        Again, thank you for allowing me to participate in the care of your patient.        Sincerely,        Jacky Quinn, DO

## 2021-02-03 NOTE — PATIENT INSTRUCTIONS
Injection Discharge Instructions      You may shower, however avoid swimming, tub baths or hot tubs for 24 hours following your procedure    You may have a mild to moderate increase in pain for several days following the injection.    It may take up to 14 days for the steroid medication to start working although you may feel the effect as early as a few days after the procedure.    You may use ice packs for 10-15 minutes, 3 to 4 times a day at the injection site for comfort    You may use anti-inflammatory medications (such as Ibuprofen or Aleve or Advil) if you are able to take safely, or acetaminophen (Tylenol) for pain control if necessary    If you were fasting, you may resume your normal diet and medications after the procedure    If you have diabetes, check your blood sugar more frequently than usual as your blood sugar may be higher than normal for 10-14 days following a steroid injection. Contact your doctor who manages your diabetes if your blood sugar is higher than usual      If you experience any of the following, call the clinic @ [phone]  - Fever over 100 degree F  - Swelling, bleeding, redness, drainage, warmth at the injection site  - New or worsening pain

## 2021-02-03 NOTE — PROGRESS NOTES
"Sports Medicine Clinic Visit    PCP: Fauzia Yepez    Erik Youngblood is a 60 year old male who is seen in f/u up for Trigger middle finger of right hand. Since last visit on 12/2/2019, patient has had decreased pain in middle joint of middle finger of right hand for over a year now. Notes that the pain started back up the beginning of December 2020. Pain is at a 6/10 but more painful in the morning. Middle finger on right had feels \"stuck\" and pops. Patient notes that the pain is more localized to the joint and just proximal to the joint.  **        Review of Systems  All other systems reviewed and are negative unless noted above.    Past Medical History:   Diagnosis Date     Adenomatous colon polyp      Allergic conjunctivitis      Cancer (H) 12/22/14    Prostate Cancer / Removed     Complication of anesthesia     nausea     Hypercholesterolemia      Hypertension      Obesity, Class I, BMI 30-34.9 5/19/2016     Spinal stenosis      Past Surgical History:   Procedure Laterality Date     APPENDECTOMY       COLONOSCOPY  8/9/2016    All was good     COLONOSCOPY WITH CO2 INSUFFLATION N/A 8/9/2016    Procedure: COLONOSCOPY WITH CO2 INSUFFLATION;  Surgeon: Evan Brown MD;  Location: [facility]     LAPAROSCOPIC PROSTATECTOMY, LYMPHADENECTOMY, COMBINED  3/20/15    robot assisted, Dr. Anton Alonso     REPAIR PTOSIS       REPAIR PTOSIS BILATERAL  11/23/2012    Procedure: REPAIR PTOSIS BILATERAL;  BILATERAL UPPER LID MECHANICAL PTOSIS REPAIR AND BILATERAL BROWN PTOSIS;  Surgeon: Lowell Wood MD;  Location: [facility]     REPAIR PTOSIS BROW BILATERAL  11/23/2012    Procedure: REPAIR PTOSIS BROW BILATERAL;;  Surgeon: Lowell Wood MD;  Location: [facility]     VASECTOMY       Family History   Problem Relation Age of Onset     Hypertension Mother      Diabetes Mother      Hypertension Father      Prostate Cancer Father      Cancer Father      Hypertension Maternal Grandmother      Hypertension Maternal " Grandfather      Hypertension Paternal Grandmother      Hypertension Paternal Grandfather      Hypertension Brother      Prostate Cancer Brother      Cancer Brother      Prostate Cancer Brother      Cerebrovascular Disease No family hx of      Thyroid Disease No family hx of      Glaucoma No family hx of      Macular Degeneration No family hx of      Social History     Socioeconomic History     Marital status:      Spouse name: Not on file     Number of children: Not on file     Years of education: Not on file     Highest education level: Not on file   Occupational History     Not on file   Social Needs     Financial resource strain: Not on file     Food insecurity     Worry: Not on file     Inability: Not on file     Transportation needs     Medical: Not on file     Non-medical: Not on file   Tobacco Use     Smoking status: Never Smoker     Smokeless tobacco: Never Used   Substance and Sexual Activity     Alcohol use: Yes     Alcohol/week: 0.0 standard drinks     Comment: rare     Drug use: No     Sexual activity: Yes     Partners: Female     Birth control/protection: Male Surgical, None   Lifestyle     Physical activity     Days per week: Not on file     Minutes per session: Not on file     Stress: Not on file   Relationships     Social connections     Talks on phone: Not on file     Gets together: Not on file     Attends Mormon service: Not on file     Active member of club or organization: Not on file     Attends meetings of clubs or organizations: Not on file     Relationship status: Not on file     Intimate partner violence     Fear of current or ex partner: Not on file     Emotionally abused: Not on file     Physically abused: Not on file     Forced sexual activity: Not on file   Other Topics Concern     Parent/sibling w/ CABG, MI or angioplasty before 65F 55M? No   Social History Narrative     Not on file         Objective  /86 (BP Location: Right arm, Patient Position: Sitting, Cuff Size:  Adult Regular)   Wt 98.9 kg (218 lb)   BMI 30.40 kg/m      GENERAL APPEARANCE: healthy, alert and no distress   GAIT: NORMAL  SKIN: no suspicious lesions or rashes  NEURO: Normal strength and tone, mentation intact and speech normal  PSYCH:  mentation appears normal and affect normal/bright  HEENT: no scleral icterus  CV: distal perfusion intact  RESP: nonlabored breathing      Exam  Right hand  No swelling or ecchymosis  Some triggering right long finger  Mild tenderness palmar base long finger      Radiology  None new today    Assessment:  1. Trigger middle finger of right hand        Plan:  Discussed the assessment with the patient.  Reviewed course and options. Good response to past injections, though we discussed pros/cons of repeating injections. Last > 1 year ago. Will repeat today, but we discussed if future recurrence, then would refer to ortho surgeon.  He inquired about potential for a clinic-based procedure, rather than through surgery center or hospital. Can certainly try to point him in that direction (e.g., Dr Black) if needed.  He has splint already, discussed again splint use.  Steroid injection of the right long finger A1 pulley was performed today in clinic  Icing for the next 1-2 days may be helpful for pain. Injection may take 10-14 days to see the full effect.  Follow up: will leave as needed.  Questions answered. Discussed signs and symptoms that may indicate more serious issues; the patient was instructed to seek appropriate care if noted. Lester indicates understanding of these issues and agrees with the plan.      Jacky Quinn DO, CAQ          Hand / Upper Extremity Injection/Arthrocentesis: R long A1    Date/Time: 2/3/2021 11:47 AM  Performed by: Jacky Quinn DO  Authorized by: Jacky Quinn DO     Indications:  Pain  Needle Size:  25 G  Guidance: landmark    Approach:  Volar  Condition: trigger finger    Location:  Long finger    Site:  R long A1  Medications:   20 mg triamcinolone 40 MG/ML; 0.5 mL lidocaine 1 %  Outcome:  Tolerated well, no immediate complications  Procedure discussed: discussed risks, benefits, and alternatives    Consent Given by:  Patient  Timeout: timeout called immediately prior to procedure    Prep: patient was prepped and draped in usual sterile fashion     Half volume injected        Patient Instructions       Injection Discharge Instructions      You may shower, however avoid swimming, tub baths or hot tubs for 24 hours following your procedure    You may have a mild to moderate increase in pain for several days following the injection.    It may take up to 14 days for the steroid medication to start working although you may feel the effect as early as a few days after the procedure.    You may use ice packs for 10-15 minutes, 3 to 4 times a day at the injection site for comfort    You may use anti-inflammatory medications (such as Ibuprofen or Aleve or Advil) if you are able to take safely, or acetaminophen (Tylenol) for pain control if necessary    If you were fasting, you may resume your normal diet and medications after the procedure    If you have diabetes, check your blood sugar more frequently than usual as your blood sugar may be higher than normal for 10-14 days following a steroid injection. Contact your doctor who manages your diabetes if your blood sugar is higher than usual      If you experience any of the following, call the clinic @ [phone]  - Fever over 100 degree F  - Swelling, bleeding, redness, drainage, warmth at the injection site  - New or worsening pain          Note: Time spent in one-on-one evaluation and discussion with the patient regarding the nature of problem, course, prior treatments, and therapeutic options, along with review of medical record (including outside sources/documentation), and completing documentation: 21 minutes.      This note consists of symbols derived from keyboarding, dictation and/or voice  recognition software. As a result, there may be errors in the script that have gone undetected. Please consider this when interpreting information found in this chart.

## 2021-02-08 ENCOUNTER — MYC MEDICAL ADVICE (OUTPATIENT)
Dept: FAMILY MEDICINE | Facility: CLINIC | Age: 61
End: 2021-02-08

## 2021-03-27 ENCOUNTER — MYC MEDICAL ADVICE (OUTPATIENT)
Dept: FAMILY MEDICINE | Facility: CLINIC | Age: 61
End: 2021-03-27

## 2021-05-15 ENCOUNTER — HEALTH MAINTENANCE LETTER (OUTPATIENT)
Age: 61
End: 2021-05-15

## 2021-06-27 ENCOUNTER — MYC MEDICAL ADVICE (OUTPATIENT)
Dept: ORTHOPEDICS | Facility: CLINIC | Age: 61
End: 2021-06-27

## 2021-06-27 DIAGNOSIS — M65.331 TRIGGER MIDDLE FINGER OF RIGHT HAND: Primary | ICD-10-CM

## 2021-07-19 NOTE — PROGRESS NOTES
SUBJECTIVE:   Erik Youngblood is a 61 year old male who is seen in consultation at the request of  Jacky Quinn M.D. for evaluation of difficulties with the right  3rd finger x 2-3 years   No known injury.   Pain is located in the finger and the palm area over the corresponding A1 pulley(s), with flexion  Locking sometimes at night   Often can't bend far enough to lock now.  Has history of osteoarthritis of fingers  Treatments tried to this point: previous corticosteroid injection x 3, last one did not work, others did x 1 year. Last injection was January      Past Medical History:   Past Medical History:   Diagnosis Date     Adenomatous colon polyp      Allergic conjunctivitis      Cancer (H) 12/22/14    Prostate Cancer / Removed     Complication of anesthesia     nausea     Hypercholesterolemia      Hypertension      Obesity, Class I, BMI 30-34.9 5/19/2016     Spinal stenosis      Past Surgical History:   Past Surgical History:   Procedure Laterality Date     APPENDECTOMY       COLONOSCOPY  8/9/2016    All was good     COLONOSCOPY WITH CO2 INSUFFLATION N/A 8/9/2016    Procedure: COLONOSCOPY WITH CO2 INSUFFLATION;  Surgeon: Evan Brown MD;  Location: [facility]     LAPAROSCOPIC PROSTATECTOMY, LYMPHADENECTOMY, COMBINED  3/20/15    robot assisted, Dr. Anton Alonso     REPAIR PTOSIS       REPAIR PTOSIS BILATERAL  11/23/2012    Procedure: REPAIR PTOSIS BILATERAL;  BILATERAL UPPER LID MECHANICAL PTOSIS REPAIR AND BILATERAL BROWN PTOSIS;  Surgeon: Lowell Wood MD;  Location: [facility]     REPAIR PTOSIS BROW BILATERAL  11/23/2012    Procedure: REPAIR PTOSIS BROW BILATERAL;;  Surgeon: Lowell Wood MD;  Location: [facility]     VASECTOMY       Family History:   Family History   Problem Relation Age of Onset     Hypertension Mother      Diabetes Mother      Hypertension Father      Prostate Cancer Father      Cancer Father      Hypertension Maternal Grandmother      Hypertension Maternal  Grandfather      Hypertension Paternal Grandmother      Hypertension Paternal Grandfather      Hypertension Brother      Prostate Cancer Brother      Cancer Brother      Prostate Cancer Brother      Cerebrovascular Disease No family hx of      Thyroid Disease No family hx of      Glaucoma No family hx of      Macular Degeneration No family hx of      Social History:   Social History     Tobacco Use     Smoking status: Never Smoker     Smokeless tobacco: Never Used   Substance Use Topics     Alcohol use: Yes     Alcohol/week: 0.0 standard drinks     Comment: rare       Review of Systems:  Constitutional:  NEGATIVE for fever, chills, change in weight  Integumentary/Skin:  NEGATIVE for worrisome rashes, moles or lesions  Eyes:  NEGATIVE for vision changes or irritation  ENT/Mouth:  NEGATIVE for ear, mouth and throat problems  Resp:  NEGATIVE for significant cough or SOB  Breast:  NEGATIVE for masses, tenderness or discharge  CV:  NEGATIVE for chest pain, palpitations or peripheral edema  GI:  NEGATIVE for nausea, abdominal pain, heartburn, or change in bowel habits  :  Negative   Musculoskeletal:  See HPI above  Neuro:  NEGATIVE for weakness, dizziness or paresthesias  Endocrine:  NEGATIVE for temperature intolerance, skin/hair changes  Heme/allergy/immune:  NEGATIVE for bleeding problems  Psychiatric:  NEGATIVE for changes in mood or affect      OBJECTIVE:  Physical Exam:  /74 (BP Location: Left arm, Patient Position: Sitting, Cuff Size: Adult Regular)   Pulse 78   SpO2 97%   General Appearance: healthy, alert and no distress   Skin: no suspicious lesions or rashes  Neuro: Normal strength and tone, mentation intact and speech normal  Vascular: good pulses, and cappillary refill   Lymph: no lymphadenopathy   Psych:  mentation appears normal and affect normal/bright  Resp: no increased work of breathing     Right Hand Exam:  Has tenderness over the right  3rd finger A1 pulley(s),   no locking/triggering  demonstrated of the right  3rd finger,   can't bend far enough to lock now.     ASSESSMENT:   Right 3rd trigger finger    PLAN:   We talked about the options: taping/splinting the PIP joint periodically, corticosteroid injection, and trigger finger release. I have explained the nature of the surgical procedure, the risks and recovery time with the patient.   he has decided to proceed with trigger finger release.      BG Payan MD  Dept. Orthopedic Surgery  [facility]

## 2021-07-20 ENCOUNTER — PREP FOR PROCEDURE (OUTPATIENT)
Dept: ORTHOPEDICS | Facility: CLINIC | Age: 61
End: 2021-07-20

## 2021-07-20 ENCOUNTER — OFFICE VISIT (OUTPATIENT)
Dept: ORTHOPEDICS | Facility: CLINIC | Age: 61
End: 2021-07-20
Attending: PEDIATRICS
Payer: COMMERCIAL

## 2021-07-20 VITALS — OXYGEN SATURATION: 97 % | HEART RATE: 78 BPM | DIASTOLIC BLOOD PRESSURE: 74 MMHG | SYSTOLIC BLOOD PRESSURE: 138 MMHG

## 2021-07-20 DIAGNOSIS — M65.331 TRIGGER MIDDLE FINGER OF RIGHT HAND: ICD-10-CM

## 2021-07-20 DIAGNOSIS — M65.30 TRIGGER FINGER, ACQUIRED: Primary | ICD-10-CM

## 2021-07-20 DIAGNOSIS — M65.331 TRIGGER FINGER, RIGHT MIDDLE FINGER: Primary | ICD-10-CM

## 2021-07-20 PROCEDURE — 99203 OFFICE O/P NEW LOW 30 MIN: CPT | Performed by: ORTHOPAEDIC SURGERY

## 2021-07-20 ASSESSMENT — PAIN SCALES - GENERAL: PAINLEVEL: MODERATE PAIN (5)

## 2021-07-20 NOTE — LETTER
7/20/2021         RE: Erik Youngblood  [address]  [facility] [address]        Dear Colleague,    Thank you for referring your patient, Erik Youngblood, to the [facility]. Please see a copy of my visit note below.    SUBJECTIVE:   Erik Youngblood is a 61 year old male who is seen in consultation at the request of  Jacky Quinn M.D. for evaluation of difficulties with the right  3rd finger x 2-3 years   No known injury.   Pain is located in the finger and the palm area over the corresponding A1 pulley(s), with flexion  Locking sometimes at night   Often can't bend far enough to lock now.  Has history of osteoarthritis of fingers  Treatments tried to this point: previous corticosteroid injection x 3, last one did not work, others did x 1 year. Last injection was January      Past Medical History:   Past Medical History:   Diagnosis Date     Adenomatous colon polyp      Allergic conjunctivitis      Cancer (H) 12/22/14    Prostate Cancer / Removed     Complication of anesthesia     nausea     Hypercholesterolemia      Hypertension      Obesity, Class I, BMI 30-34.9 5/19/2016     Spinal stenosis      Past Surgical History:   Past Surgical History:   Procedure Laterality Date     APPENDECTOMY       COLONOSCOPY  8/9/2016    All was good     COLONOSCOPY WITH CO2 INSUFFLATION N/A 8/9/2016    Procedure: COLONOSCOPY WITH CO2 INSUFFLATION;  Surgeon: Evan Brown MD;  Location: [facility]     LAPAROSCOPIC PROSTATECTOMY, LYMPHADENECTOMY, COMBINED  3/20/15    robot assisted, Dr. Anton Alonso     REPAIR PTOSIS       REPAIR PTOSIS BILATERAL  11/23/2012    Procedure: REPAIR PTOSIS BILATERAL;  BILATERAL UPPER LID MECHANICAL PTOSIS REPAIR AND BILATERAL BROWN PTOSIS;  Surgeon: Lowell Wood MD;  Location: [facility]     REPAIR PTOSIS BROW BILATERAL  11/23/2012    Procedure: REPAIR PTOSIS BROW BILATERAL;;  Surgeon: Lowell Wood MD;  Location: [facility]     VASECTOMY        Family History:   Family History   Problem Relation Age of Onset     Hypertension Mother      Diabetes Mother      Hypertension Father      Prostate Cancer Father      Cancer Father      Hypertension Maternal Grandmother      Hypertension Maternal Grandfather      Hypertension Paternal Grandmother      Hypertension Paternal Grandfather      Hypertension Brother      Prostate Cancer Brother      Cancer Brother      Prostate Cancer Brother      Cerebrovascular Disease No family hx of      Thyroid Disease No family hx of      Glaucoma No family hx of      Macular Degeneration No family hx of      Social History:   Social History     Tobacco Use     Smoking status: Never Smoker     Smokeless tobacco: Never Used   Substance Use Topics     Alcohol use: Yes     Alcohol/week: 0.0 standard drinks     Comment: rare       Review of Systems:  Constitutional:  NEGATIVE for fever, chills, change in weight  Integumentary/Skin:  NEGATIVE for worrisome rashes, moles or lesions  Eyes:  NEGATIVE for vision changes or irritation  ENT/Mouth:  NEGATIVE for ear, mouth and throat problems  Resp:  NEGATIVE for significant cough or SOB  Breast:  NEGATIVE for masses, tenderness or discharge  CV:  NEGATIVE for chest pain, palpitations or peripheral edema  GI:  NEGATIVE for nausea, abdominal pain, heartburn, or change in bowel habits  :  Negative   Musculoskeletal:  See HPI above  Neuro:  NEGATIVE for weakness, dizziness or paresthesias  Endocrine:  NEGATIVE for temperature intolerance, skin/hair changes  Heme/allergy/immune:  NEGATIVE for bleeding problems  Psychiatric:  NEGATIVE for changes in mood or affect      OBJECTIVE:  Physical Exam:  /74 (BP Location: Left arm, Patient Position: Sitting, Cuff Size: Adult Regular)   Pulse 78   SpO2 97%   General Appearance: healthy, alert and no distress   Skin: no suspicious lesions or rashes  Neuro: Normal strength and tone, mentation intact and speech normal  Vascular: good pulses, and  cappillary refill   Lymph: no lymphadenopathy   Psych:  mentation appears normal and affect normal/bright  Resp: no increased work of breathing     Right Hand Exam:  Has tenderness over the right  3rd finger A1 pulley(s),   no locking/triggering demonstrated of the right  3rd finger,   can't bend far enough to lock now.     ASSESSMENT:   Right 3rd trigger finger    PLAN:   We talked about the options: taping/splinting the PIP joint periodically, corticosteroid injection, and trigger finger release. I have explained the nature of the surgical procedure, the risks and recovery time with the patient.   he has decided to proceed with trigger finger release.      BG Payan MD  Dept. Orthopedic Surgery  [facility]       Again, thank you for allowing me to participate in the care of your patient.        Sincerely,        Sebastian Payan MD

## 2021-07-21 ENCOUNTER — TELEPHONE (OUTPATIENT)
Dept: ORTHOPEDICS | Facility: CLINIC | Age: 61
End: 2021-07-21

## 2021-07-21 DIAGNOSIS — Z11.59 ENCOUNTER FOR SCREENING FOR OTHER VIRAL DISEASES: ICD-10-CM

## 2021-07-21 PROBLEM — M65.331 TRIGGER FINGER, RIGHT MIDDLE FINGER: Status: ACTIVE | Noted: 2021-07-21

## 2021-07-21 NOTE — TELEPHONE ENCOUNTER
Type of surgery: release,right middle trigger finger (right)  CPT 68559   Trigger finger, acquired M65.30     Trigger middle finger of right hand M65.331    Location of surgery: [facility]  Date and time of surgery: 9-15-21  TBD  Surgeon: Dr Payan  Pre-Op Appt Date: n/a  Post-Op Appt Date: 9-28-21   Packet sent out: Yes  Pre-cert/Authorization completed: No prior auth needed per [employer] online list.    Date: 7/22/21    Nia Juan  Prior Authorization Dept  [phone]

## 2021-09-04 ENCOUNTER — HEALTH MAINTENANCE LETTER (OUTPATIENT)
Age: 61
End: 2021-09-04

## 2021-09-07 DIAGNOSIS — Z01.818 PRE-OP TESTING: Primary | ICD-10-CM

## 2021-09-11 ENCOUNTER — LAB (OUTPATIENT)
Dept: URGENT CARE | Facility: URGENT CARE | Age: 61
End: 2021-09-11
Payer: COMMERCIAL

## 2021-09-11 DIAGNOSIS — Z11.59 ENCOUNTER FOR SCREENING FOR OTHER VIRAL DISEASES: ICD-10-CM

## 2021-09-11 PROCEDURE — U0003 INFECTIOUS AGENT DETECTION BY NUCLEIC ACID (DNA OR RNA); SEVERE ACUTE RESPIRATORY SYNDROME CORONAVIRUS 2 (SARS-COV-2) (CORONAVIRUS DISEASE [COVID-19]), AMPLIFIED PROBE TECHNIQUE, MAKING USE OF HIGH THROUGHPUT TECHNOLOGIES AS DESCRIBED BY CMS-2020-01-R: HCPCS

## 2021-09-11 PROCEDURE — 99207 PR NO CHARGE LOS: CPT

## 2021-09-11 PROCEDURE — U0005 INFEC AGEN DETEC AMPLI PROBE: HCPCS

## 2021-09-12 LAB — SARS-COV-2 RNA RESP QL NAA+PROBE: NEGATIVE

## 2021-09-14 ENCOUNTER — ANESTHESIA EVENT (OUTPATIENT)
Dept: SURGERY | Facility: AMBULATORY SURGERY CENTER | Age: 61
End: 2021-09-14

## 2021-09-15 ENCOUNTER — HOSPITAL ENCOUNTER (OUTPATIENT)
Facility: AMBULATORY SURGERY CENTER | Age: 61
Discharge: HOME | End: 2021-09-15
Attending: ORTHOPAEDIC SURGERY | Admitting: ORTHOPAEDIC SURGERY
Payer: COMMERCIAL

## 2021-09-15 ENCOUNTER — ANESTHESIA (OUTPATIENT)
Dept: SURGERY | Facility: AMBULATORY SURGERY CENTER | Age: 61
End: 2021-09-15

## 2021-09-15 VITALS
TEMPERATURE: 98.6 F | RESPIRATION RATE: 18 BRPM | OXYGEN SATURATION: 95 % | SYSTOLIC BLOOD PRESSURE: 153 MMHG | DIASTOLIC BLOOD PRESSURE: 83 MMHG

## 2021-09-15 DIAGNOSIS — M65.331 TRIGGER FINGER, RIGHT MIDDLE FINGER: ICD-10-CM

## 2021-09-15 PROCEDURE — 26055 INCISE FINGER TENDON SHEATH: CPT | Mod: F7 | Performed by: ORTHOPAEDIC SURGERY

## 2021-09-15 PROCEDURE — 26055 INCISE FINGER TENDON SHEATH: CPT | Mod: F7

## 2021-09-15 PROCEDURE — G8918 PT W/O PREOP ORDER IV AB PRO: HCPCS

## 2021-09-15 PROCEDURE — G8907 PT DOC NO EVENTS ON DISCHARG: HCPCS

## 2021-09-15 RX ORDER — HYDROCODONE BITARTRATE AND ACETAMINOPHEN 5; 325 MG/1; MG/1
1-2 TABLET ORAL EVERY 4 HOURS PRN
Qty: 6 TABLET | Refills: 0 | Status: SHIPPED | OUTPATIENT
Start: 2021-09-15 | End: 2021-10-13

## 2021-09-15 RX ORDER — CELECOXIB 200 MG/1
200 CAPSULE ORAL
Status: DISCONTINUED | OUTPATIENT
Start: 2021-09-15 | End: 2021-09-16 | Stop reason: HOSPADM

## 2021-09-15 RX ORDER — HYDROCODONE BITARTRATE AND ACETAMINOPHEN 5; 325 MG/1; MG/1
1 TABLET ORAL
Status: DISCONTINUED | OUTPATIENT
Start: 2021-09-15 | End: 2021-09-16 | Stop reason: HOSPADM

## 2021-09-15 NOTE — OP NOTE
OPERATIVE / PROCEDURE    DATE OF SERVICE:  September 15, 2021    PREOPERATIVE DIAGNOSIS   Right 3rd trigger finger.     POSTOPERATIVE DIAGNOSIS   Right 3rd trigger finger.    OPERATIVE PROCEDURE   Right 3rd trigger finger release.     SURGEON  Sebastian Payan M.D.     FIRST ASSISTANT   CAPRICE Juárez.     ANESTHESIA  Local     EBL: 5cc    INDICATIONS  This is a  61 year old year-old male with locking of the right 3rd finger, felt to be  a trigger finger. He has had the problem for 2-3 years, and lately hasn't been able to   flex the finger far enough to lock.  Had failed conservative treatment.   Informed consent was obtained for the procedure.     PROCEDURE IN DETAIL  The patient was taken to the operating room and placed on the operating table  in supine position. Tourniquet was placed around the proximal forearm. Limb  was prepped and draped in the usual sterile fashion. A combination of 0.25%  Marcaine and 1% lidocaine, both without epinephrine, were injected into the  anticipated incision site. The limb was exsanguinated, and tourniquet  inflated to 250 mmHg. A transverse incision was made in the flexion  crease of the palm over the A1 pulley of the 3rd flexor tendon. Once through the dermal  layer, spreading technique was used and retractors inserted to avoid any  neurovascular injury. I then incised the palmar fascia and identified the  flexor tendon sheath. The tourniquet was not providing much hemostasis,   so the case took a little longer because of poor visualization.  We identified the A1 pulley and incised it with a #11 blade.   I used a Rubi to make sure that it had been  completely. We  then had him try to lock up the digit, which he could not. I was able to force the finger into full flexion as well.  I also observed that there were some tendinous changes within the flexor tendon, right about the  area of stenosis. The wound was then irrigated and the tourniquet deflated.  Minimal  bleeders were encountered after that..     Then the wound was closed with interrupted 5-0 nylon sutures placed in a  vertical mattress fashion. Sterile dressings were applied. he was then  transferred hospital cart and to the recovery area in stable condition.       SAW VAIL MD

## 2021-09-28 ENCOUNTER — OFFICE VISIT (OUTPATIENT)
Dept: ORTHOPEDICS | Facility: CLINIC | Age: 61
End: 2021-09-28
Payer: COMMERCIAL

## 2021-09-28 VITALS — HEART RATE: 74 BPM | SYSTOLIC BLOOD PRESSURE: 138 MMHG | OXYGEN SATURATION: 98 % | DIASTOLIC BLOOD PRESSURE: 82 MMHG

## 2021-09-28 DIAGNOSIS — Z98.890 S/P TRIGGER FINGER RELEASE: Primary | ICD-10-CM

## 2021-09-28 PROCEDURE — 99024 POSTOP FOLLOW-UP VISIT: CPT | Performed by: ORTHOPAEDIC SURGERY

## 2021-09-28 ASSESSMENT — PAIN SCALES - GENERAL: PAINLEVEL: MILD PAIN (3)

## 2021-09-28 NOTE — PROGRESS NOTES
SURGERY: Right long (middle) finger trigger release    DATE OF SURGERY: 9/15    HISTORY:    Since surgery, no issues. Denies fevers, chills, night sweats. Denies numbness or tingling. Pain has been controlled and currently no pain. No concerns today.    REVIEW OF SYSTEMS:    Denies numbness, tingling, parasthesias.   Denies headaches.   Denies fevers, chills, night sweats   Denies chest pain.   Denies shortness of breath.   Denies any skin problems, abrasions, rashes, irritation.      EXAM:  /82 (BP Location: Right arm, Patient Position: Sitting, Cuff Size: Adult Regular)   Pulse 74   SpO2 98%     GENERAL APPEARANCE: healthy, alert and no distress   GAIT: NORMAL  SKIN: no suspicious lesions or rashes  RESPIRATORY: No increased work of breathing.  PSYCH:  mentation appears normal and affect normal/bright, not anxious.    MUSCULOSKELETAL:    Right HAND/FINGERS:   Skin intact. Normal color and tone.   Incision/wound is healing well with skin edges well approximated. No erythema or drainage. Sutures in place.  No palpable triggering noted at A1 pulley of Right long (middle) finger.  Mild ttp at incision.  Intact sensation to light touch in median, radial and ulnar nerve distributions of the hand and specifically the radial and ulnar digital nerves of the Right long (middle) finger.      ASSESSMENT: s/p trigger release of Right long (middle) finger, doing well.      PLAN: routine postoperative trigger release  * sutures removed  * ok to get incision wet. No soaking for another week  * gradual return to use of hand and fingers  * scar massage and desensitization.  * return to clinic 4 weeks for clinical check, sooner as needed      BG Payan MD  Dept. Orthopedic Surgery  [facility]

## 2021-09-28 NOTE — LETTER
9/28/2021         RE: Erik Youngblood  [address]  [facility] [address]        Dear Colleague,    Thank you for referring your patient, Erik Youngblood, to the [facility]. Please see a copy of my visit note below.      SURGERY: Right long (middle) finger trigger release    DATE OF SURGERY: 9/15    HISTORY:    Since surgery, no issues. Denies fevers, chills, night sweats. Denies numbness or tingling. Pain has been controlled and currently no pain. No concerns today.    REVIEW OF SYSTEMS:    Denies numbness, tingling, parasthesias.   Denies headaches.   Denies fevers, chills, night sweats   Denies chest pain.   Denies shortness of breath.   Denies any skin problems, abrasions, rashes, irritation.      EXAM:  /82 (BP Location: Right arm, Patient Position: Sitting, Cuff Size: Adult Regular)   Pulse 74   SpO2 98%     GENERAL APPEARANCE: healthy, alert and no distress   GAIT: NORMAL  SKIN: no suspicious lesions or rashes  RESPIRATORY: No increased work of breathing.  PSYCH:  mentation appears normal and affect normal/bright, not anxious.    MUSCULOSKELETAL:    Right HAND/FINGERS:   Skin intact. Normal color and tone.   Incision/wound is healing well with skin edges well approximated. No erythema or drainage. Sutures in place.  No palpable triggering noted at A1 pulley of Right long (middle) finger.  Mild ttp at incision.  Intact sensation to light touch in median, radial and ulnar nerve distributions of the hand and specifically the radial and ulnar digital nerves of the Right long (middle) finger.      ASSESSMENT: s/p trigger release of Right long (middle) finger, doing well.      PLAN: routine postoperative trigger release  * sutures removed  * ok to get incision wet. No soaking for another week  * gradual return to use of hand and fingers  * scar massage and desensitization.  * return to clinic 4 weeks for clinical check, sooner as needed      BG Payan MD  Dept.  Orthopedic Surgery  [facility]         Again, thank you for allowing me to participate in the care of your patient.        Sincerely,        Sebastian Payan MD

## 2021-10-12 NOTE — PROGRESS NOTES
Assessment & Plan     Essential hypertension with goal blood pressure less than 140/90  BP elevated  But he is out of Amlodipine.  He wants to consider changing to alternate antihypertensive as the hydrochlorothiazide has caused hypokalemia- checking K+ today and will change to alternate medication if he is still low.  - OPTOMETRY REFERRAL  - amLODIPine (NORVASC) 10 MG tablet  Dispense: 90 tablet; Refill: 1  - Basic metabolic panel  (Ca, Cl, CO2, Creat, Gluc, K, Na, BUN)    Prostate cancer (H)  Patient requesting screen PSA today  - PSA, screen    Hyperlipidemia LDL goal <130  Stable, LDL at goal  - simvastatin (ZOCOR) 20 MG tablet  Dispense: 90 tablet; Refill: 1    Screen for colon cancer    - Adult Gastro Ref - Procedure Only         Work on weight loss  Regular exercise  See Patient Instructions    Return in about 3 months (around 1/13/2022), or if symptoms worsen or fail to improve, for Follow up LABS, Follow up.    IRMA Joy [facility]    Lupe Batista is a 61 year old who presents for the following health issues     HPI     Hypertension Follow-up      Do you check your blood pressure regularly outside of the clinic? Yes     Are you following a low salt diet? Yes    Are your blood pressures ever more than 140 on the top number (systolic) OR more   than 90 on the bottom number (diastolic), for example 140/90? No      How many servings of fruits and vegetables do you eat daily?  2-3    On average, how many sweetened beverages do you drink each day (Examples: soda, juice, sweet tea, etc.  Do NOT count diet or artificially sweetened beverages)?   0    How many days per week do you exercise enough to make your heart beat faster? 7    How many minutes a day do you exercise enough to make your heart beat faster? 30 - 60    How many days per week do you miss taking your medication? 0    PROSTATE CANCER- followed by Urology. He is now greater than 5 years out.        Review of Systems   Constitutional, HEENT, cardiovascular, pulmonary, gi and gu systems are negative, except as otherwise noted.      Objective    BP (!) 152/86   Pulse 74   Temp 97.2  F (36.2  C) (Tympanic)   Wt 97.3 kg (214 lb 9.6 oz)   SpO2 98%   BMI 29.93 kg/m    Body mass index is 29.93 kg/m .  Physical Exam   GENERAL: healthy, alert and no distress  EYES: Eyes grossly normal to inspection, PERRL and conjunctivae and sclerae normal  HENT: ear canals and TM's normal, nose and mouth without ulcers or lesions  NECK: no adenopathy, no asymmetry, masses, or scars and thyroid normal to palpation  RESP: lungs clear to auscultation - no rales, rhonchi or wheezes  CV: regular rate and rhythm, normal S1 S2, no S3 or S4, no murmur, click or rub, no peripheral edema and peripheral pulses strong  ABDOMEN: soft, nontender, no hepatosplenomegaly, no masses and bowel sounds normal   (male): normal male genitalia without lesions or urethral discharge, no hernia  MS: no gross musculoskeletal defects noted, no edema  SKIN: no suspicious lesions or rashes  NEURO: Normal strength and tone, mentation intact and speech normal  PSYCH: mentation appears normal, affect normal/bright  LYMPH: no cervical, supraclavicular, axillary, or inguinal adenopathy    No results found for this or any previous visit (from the past 24 hour(s)).

## 2021-10-12 NOTE — PATIENT INSTRUCTIONS
At [facility], we strive to deliver an exceptional experience to you, every time we see you. If you receive a survey, please complete it as we do value your feedback.  If you have MyChart, you can expect to receive results automatically within 24 hours of their completion.  Your provider will send a note interpreting your results as well.   If you do not have MyChart, you should receive your results in about a week by mail.    Your care team:                            Family Medicine Internal Medicine   MD Waylon Reyes MD Shantel Branch-Fleming, MD Srinivasa Vaka, MD Katya Belousova, PACECI Vera, APRN CNP    Dimitrios Andino, MD Pediatrics   Austin Stephens, PACECI Newby, CNP MD Mirela White APRN CNP   MD Amanda Baron MD Deborah Mielke, MD Kim Thein, APRN [facility]      Clinic hours: Monday - Thursday 7 am-6 pm; Fridays 7 am-5 pm.   Urgent care: Monday - Friday 10 am- 8 pm; Saturday and Sunday 9 am-5 pm.    Clinic: [phone]       [facility]: Monday - Thursday 8 am - 7 pm; Friday 8 am - 6 pm  [facility]: [phone]     Use [email or web address] for 24/7 diagnosis and treatment of dozens of conditions.    Patient Education     Prevention Guidelines, Men Ages 50 to 64  Screening tests and vaccines are an important part of managing your health. A screening test is done to find diseases in people who don't have any symptoms. The goal is to find a disease early so lifestyle changes and checkups can reduce the risk of disease. Or the goal may be to detect it early to treat it most effectively. Screening tests are not used to diagnose a disease. But they are used to see if more testing is needed. Health counseling is important, too. Below are guidelines for these, for men ages 50 to 64. Keep in mind that screening recommendations vary among expert groups. Talk with  your healthcare provider about which tests are best for you and to make sure you re up-to-date on what you need.   Screening  Who needs it  How often    Unhealthy alcohol use  All men in this age group  At routine exams   Blood pressure All men in this age group  Yearly checkup if your blood pressure is normal   Normal blood pressure is less than 120/80 mm Hg   If your blood pressure reading is higher than normal, follow the advice of your healthcare provider    Colorectal cancer All men at average risk in this age group  Multiple tests are available and are used at different times. Possible tests include:     Flexible sigmoidoscopy every 5 years, or    Colonoscopy every 10 years, or    CT colonography (virtual colonoscopy) every 5 years, or    Yearly fecal occult blood test, or    Yearly fecal immunochemical test every year, or    Stool DNA test, every 3 years  If you choose a test other than a colonoscopy and have an abnormal test result, you will need to follow-up with a colonoscopy. Screening recommendations advice vary varies among expert groups. Talk with your healthcare provider about which tests are best for you.   Some people should be screened using a different schedule because of their personal or family health history. Talk with your healthcare provider about your health history.    Depression All men in this age group  At routine exams   Type 2 diabetes or prediabetes  All men beginning at age 45 and men without symptoms at any age who are overweight or obese and have 1 or more other risk factors for diabetes  At least every 3 years (yearly if your blood sugar has already begun to rise)    Type 2 diabetes All men with prediabetes  Every year   Hepatitis C Men at increased risk for infection; 1 time for those born between 1945 and 1965  At routine exams; talk with your healthcare provider.    High cholesterol or triglycerides  All men in this age group  At least every 5 years; talk with your healthcare  provider about your risk    HIV All males up to age 64 and men at increased risk.  At least once up to age 64 at routine exams; talk with your healthcare provider if you are at risk    Lung cancer Men between the ages of 55 to 74 who in fairly good health and are at higher risk for lung cancer     Currently smoke or who have quit within past 15 years    30-pack year smoking history  a Eligibility criteria and age limit (possibly up to age 80) may vary across major organizations      Yearly lung cancer screening with a low-dose CT scan (LDCT); talk with your healthcare provider    Obesity All men in this age group  At yearly routine exams    BMI (body mass index) All men in this age group Every year, to help find out if you are at a healthy weight for your height    Prostate cancer Starting at age 50, talk with your healthcare provider about risks and benefits of testing with digital rectal exam (MARK) and prostate-specific antigen (PSA) screening  At routine exams if you decide to be tested.    Syphilis Men at increased risk for infection  At routine exams; talk with your healthcare provider    Tuberculosis Men at increased risk for infection  Talk with your healthcare provider    Vision All men in this age group  Talk with your healthcare provider   Vaccine Who needs it How often   Chickenpox (varicella)  All men in this age group who have no record of this infection or vaccine  2 doses; second dose should be given at least 4 weeks after the first dose    Hepatitis A Men at increased risk for infection  2 or 3 doses (depending on the vaccine) given at least 6 months apart; talk with your healthcare provider    Hepatitis B Men at increased risk for infection  2 or 3 doses (depending on the vaccine) second dose should be given 1 month after the first dose; if a third dose , it should be given at least 2 months after the second dose and at least 4 months after the first dose; talk with your healthcare provider     Haemophilus influenzae Type B (HIB)  Men at increased risk for infection  1 or 3 doses; talk with your healthcare provider    Influenza (flu) All men in this age group  Once a year   Measles, mumps, rubella (MMR)  Men in this age group born in 1957 or later who have no record of these infections or vaccines  1 or 2 doses; talk with your healthcare provider    Meningococcal ACWY (MenACWY)  Men at increased risk for infection  1 or 2 doses depending on your case. Then a booster every 5 years if you are still at risk. Talk with your healthcare provider.    Meningococcal B (MenB) Men at increased risk for infection 2 or 3 doses, depending on the vaccine and your case; talk with your healthcare provider    Pneumococcal conjugate vaccine (PCV13) and pneumococcal polysaccharide vaccine (PPSV23)  Men at increased risk for infection  PCV13: 1 dose ages 19 to 65 (protects against 13 types of pneumococcal bacteria)   PPSV23: 1 to 2 doses through age 64, (protects against 23 types of pneumococcal bacteria)    Tetanus/diphtheria/pertussis (Td/Tdap) booster All men in this age group  Td every 10 years, or a 1-time dose of Tdap instead of a Td booster after age 18, then Td every 10 years    Recombinant zoster vaccine (RZV0)  All men in this age group  2 doses; the 2nd dose is given 2 to 6 months after the first. This is given even if you've had shingles before or had a previous zoster live vaccine    Counseling Who needs it How often   Diet and exercise Men who are overweight or obese  When diagnosed, and then at routine exams    Sexually transmitted infection prevention  Men at increased risk for infection  At routine exams; talk with your healthcare provider    Use of daily aspirin  Men ages 50 years and up in this age group who are at high risk for cardiovascular health problems and not at increased risk for bleeding as identified by their healthcare provider y  At routine exams; talk with your healthcare provider    Use of  statins Men between the ages of 40 and 75 years who have     An LDL-C level of more than 70 mg/dL but less than 190 mg/dL, no diabetes and borderline to high level of risk    An LDL-C level of 190 mg/dL or greater    A diagnosis of diabetes and LDL-C level of greater than 70mg/dL At routine exams, or more often as directed by your healthcare provider. Statin dosages may vary based on your overall health, risk factors, and other health conditions such as diabetes. Talk with your healthcare provider about your risk .    Use of tobacco and the health effects it can cause  All men in this age group  Every exam   [employer] last reviewed this educational content on 5/1/2019 2000-2021 [employer]. All rights reserved. This information is not intended as a substitute for professional medical care. Always follow your healthcare professional's instructions.

## 2021-10-13 ENCOUNTER — OFFICE VISIT (OUTPATIENT)
Dept: FAMILY MEDICINE | Facility: CLINIC | Age: 61
End: 2021-10-13
Payer: COMMERCIAL

## 2021-10-13 VITALS
SYSTOLIC BLOOD PRESSURE: 152 MMHG | HEART RATE: 74 BPM | WEIGHT: 214.6 LBS | BODY MASS INDEX: 29.93 KG/M2 | DIASTOLIC BLOOD PRESSURE: 86 MMHG | OXYGEN SATURATION: 98 % | TEMPERATURE: 97.2 F

## 2021-10-13 DIAGNOSIS — I10 ESSENTIAL HYPERTENSION WITH GOAL BLOOD PRESSURE LESS THAN 140/90: Primary | ICD-10-CM

## 2021-10-13 DIAGNOSIS — Z12.11 SCREEN FOR COLON CANCER: ICD-10-CM

## 2021-10-13 DIAGNOSIS — E78.5 HYPERLIPIDEMIA LDL GOAL <130: ICD-10-CM

## 2021-10-13 DIAGNOSIS — C61 PROSTATE CANCER (H): ICD-10-CM

## 2021-10-13 LAB
ANION GAP SERPL CALCULATED.3IONS-SCNC: 8 MMOL/L (ref 3–14)
BUN SERPL-MCNC: 15 MG/DL (ref 7–30)
CALCIUM SERPL-MCNC: 9.3 MG/DL (ref 8.5–10.1)
CHLORIDE BLD-SCNC: 102 MMOL/L (ref 94–109)
CO2 SERPL-SCNC: 29 MMOL/L (ref 20–32)
CREAT SERPL-MCNC: 1.09 MG/DL (ref 0.66–1.25)
GFR SERPL CREATININE-BSD FRML MDRD: 72 ML/MIN/1.73M2
GLUCOSE BLD-MCNC: 88 MG/DL (ref 70–99)
POTASSIUM BLD-SCNC: 3.6 MMOL/L (ref 3.4–5.3)
PSA SERPL-MCNC: 0.02 UG/L (ref 0–4)
SODIUM SERPL-SCNC: 139 MMOL/L (ref 133–144)

## 2021-10-13 PROCEDURE — 80048 BASIC METABOLIC PNL TOTAL CA: CPT | Performed by: NURSE PRACTITIONER

## 2021-10-13 PROCEDURE — 99214 OFFICE O/P EST MOD 30 MIN: CPT | Performed by: NURSE PRACTITIONER

## 2021-10-13 PROCEDURE — G0103 PSA SCREENING: HCPCS | Performed by: NURSE PRACTITIONER

## 2021-10-13 PROCEDURE — 36415 COLL VENOUS BLD VENIPUNCTURE: CPT | Performed by: NURSE PRACTITIONER

## 2021-10-13 RX ORDER — AMLODIPINE BESYLATE 10 MG/1
TABLET ORAL
Qty: 90 TABLET | Refills: 1 | Status: SHIPPED | OUTPATIENT
Start: 2021-10-13 | End: 2021-10-18

## 2021-10-13 RX ORDER — SIMVASTATIN 20 MG
TABLET ORAL
Qty: 90 TABLET | Refills: 1 | Status: SHIPPED | OUTPATIENT
Start: 2021-10-13 | End: 2021-10-18

## 2021-10-14 DIAGNOSIS — I10 ESSENTIAL HYPERTENSION WITH GOAL BLOOD PRESSURE LESS THAN 140/90: ICD-10-CM

## 2021-10-14 DIAGNOSIS — E78.5 HYPERLIPIDEMIA LDL GOAL <130: ICD-10-CM

## 2021-10-14 NOTE — TELEPHONE ENCOUNTER
"Requested Prescriptions   Pending Prescriptions Disp Refills     amLODIPine (NORVASC) 10 MG tablet 90 tablet 1     Sig: TAKE ONE TABLET BY MOUTH ONCE DAILY (NEED TO BE SEEN IN CLINIC FOR FURTHER REFILLS)       Calcium Channel Blockers Protocol  Failed - 10/14/2021  4:50 PM        Failed - Blood pressure under 140/90 in past 12 months     BP Readings from Last 3 Encounters:   10/13/21 (!) 152/86   09/28/21 138/82   09/15/21 (!) 153/83                 Passed - Recent (12 mo) or future (30 days) visit within the authorizing provider's specialty     Patient has had an office visit with the authorizing provider or a provider within the authorizing providers department within the previous 12 mos or has a future within next 30 days. See \"Patient Info\" tab in inbasket, or \"Choose Columns\" in Meds & Orders section of the refill encounter.              Passed - Medication is active on med list        Passed - Patient is age 18 or older        Passed - Normal serum creatinine on file in past 12 months     Recent Labs   Lab Test 10/13/21  1444   CR 1.09       Ok to refill medication if creatinine is low             hydrochlorothiazide (HYDRODIURIL) 25 MG tablet 90 tablet 1     Sig: TAKE ONE TABLET BY MOUTH EVERY DAY FOR BLOOD PRESSURE       Diuretics (Including Combos) Protocol Failed - 10/14/2021  4:50 PM        Failed - Blood pressure under 140/90 in past 12 months     BP Readings from Last 3 Encounters:   10/13/21 (!) 152/86   09/28/21 138/82   09/15/21 (!) 153/83                 Failed - Normal serum potassium on file in past 12 months     Recent Labs   Lab Test 10/13/21  1444   POTASSIUM 3.6                    Passed - Recent (12 mo) or future (30 days) visit within the authorizing provider's specialty     Patient has had an office visit with the authorizing provider or a provider within the authorizing providers department within the previous 12 mos or has a future within next 30 days. See \"Patient Info\" tab in inbasket, " "or \"Choose Columns\" in Meds & Orders section of the refill encounter.              Passed - Medication is active on med list        Passed - Patient is age 18 or older        Passed - Normal serum creatinine on file in past 12 months     Recent Labs   Lab Test 10/13/21  1444   CR 1.09              Passed - Normal serum sodium on file in past 12 months     Recent Labs   Lab Test 10/13/21  1444                    simvastatin (ZOCOR) 20 MG tablet 90 tablet 1     Sig: TAKE ONE TABLET BY MOUTH EVERY EVENING FOR CHOLESTEROL       Statins Protocol Passed - 10/14/2021  4:50 PM        Passed - LDL on file in past 12 months     Recent Labs   Lab Test 01/13/21  1253   LDL 48             Passed - No abnormal creatine kinase in past 12 months     No lab results found.             Passed - Recent (12 mo) or future (30 days) visit within the authorizing provider's specialty     Patient has had an office visit with the authorizing provider or a provider within the authorizing providers department within the previous 12 mos or has a future within next 30 days. See \"Patient Info\" tab in inbasket, or \"Choose Columns\" in Meds & Orders section of the refill encounter.              Passed - Medication is active on med list        Passed - Patient is age 18 or older           Ngozi MONTEIRO, RN    "

## 2021-10-15 ENCOUNTER — TELEPHONE (OUTPATIENT)
Dept: GASTROENTEROLOGY | Facility: CLINIC | Age: 61
End: 2021-10-15

## 2021-10-15 RX ORDER — SIMVASTATIN 20 MG
TABLET ORAL
Qty: 90 TABLET | Refills: 1 | OUTPATIENT
Start: 2021-10-15

## 2021-10-15 RX ORDER — HYDROCHLOROTHIAZIDE 25 MG/1
TABLET ORAL
Qty: 90 TABLET | Refills: 1 | OUTPATIENT
Start: 2021-10-15

## 2021-10-15 RX ORDER — AMLODIPINE BESYLATE 10 MG/1
TABLET ORAL
Qty: 90 TABLET | Refills: 1 | OUTPATIENT
Start: 2021-10-15

## 2021-10-15 NOTE — TELEPHONE ENCOUNTER
Screening Questions  1. Are you active on mychart?    2. What insurance is in the chart? [employer]one    2.  Ordering/Referring Provider: Fauzia Yepez APRN CNP    3. BMI 29.3    4. Do you have any Lung issues?  NO If yes continue:   Do you use daily home oxygen?    Do you have Pulmonary Hypertension?    Do you have SEVERE asthma?     5. Have you had a heart, lung, or liver transplant? NO    6. Are you currently on dialysis or have chronic kidney disease? NO     7. Have you had a stroke or Transient ischemic attack (TIA) within 6 months? No     8. In the past 6 months, have you had any heart related issues including cardiomyopathy or heart attack? No       If yes, did it require cardiac stenting or other implantable device?      9. Do you have any implantable devices in your body (pacemaker, defib, LVAD)? No     10. Do you take nitroglycerin? If yes, how often? No     11. Are you currently taking any blood thinners? No     12. Are you a diabetic? No     13. (Females) Are you currently pregnant?   If yes, how many weeks?      15. Are you taking any prescription pain medications on a routine schedule? No  If yes, MAC sedation.    16. Do you have any chemical dependencies such as alcohol, street drugs, or methadone? No If yes, MAC sedation.    17. Do you have any history of post-traumatic stress syndrome, severe anxiety or history of psychosis? No     18. Do you transfer independently? Yes     19.  Do you have any issues with constipation? No     20. Preferred Pharmacy for Pre Prescription [id number] [address] [facility]  [address]    Scheduling Details    Which Colonoscopy Prep was Sent?: Miralax  Procedure Scheduled: colon  Provider/Surgeon: Ezio  Date of Procedure: 11/22/2021  Location:   Caller (Please ask for phone number if not scheduled by patient): Lester Youngblood      Sedation Type: CS  Conscious Sedation- Needs  for 6 hours after the  procedure  MAC/General-Needs  for 24 hours after procedure    Pre-op Required at [facility] [place], [place] and OR for MAC sedation:   (if yes advise patient they will need a pre-op prior to procedure)      Is patient on blood thinners? -no (If yes- inform patient to follow up with PCP or provider for follow up instructions)     Informed patient they will need an adult  yes  Cannot take any type of public or medical transportation alone    [facility] or Externally: yes at     Confirmed Nurse will call to complete assessment yes    Additional comments: no

## 2021-10-17 ENCOUNTER — MYC MEDICAL ADVICE (OUTPATIENT)
Dept: FAMILY MEDICINE | Facility: CLINIC | Age: 61
End: 2021-10-17

## 2021-10-17 DIAGNOSIS — E78.5 HYPERLIPIDEMIA LDL GOAL <130: Primary | ICD-10-CM

## 2021-10-17 DIAGNOSIS — I10 ESSENTIAL HYPERTENSION WITH GOAL BLOOD PRESSURE LESS THAN 140/90: ICD-10-CM

## 2021-10-18 RX ORDER — AMLODIPINE BESYLATE 10 MG/1
TABLET ORAL
Qty: 90 TABLET | Refills: 3 | Status: SHIPPED | OUTPATIENT
Start: 2021-10-18 | End: 2022-09-19

## 2021-10-18 RX ORDER — HYDROCHLOROTHIAZIDE 25 MG/1
TABLET ORAL
Qty: 90 TABLET | Refills: 3 | Status: SHIPPED | OUTPATIENT
Start: 2021-10-18 | End: 2022-09-19

## 2021-10-18 RX ORDER — HYDROCHLOROTHIAZIDE 25 MG/1
TABLET ORAL
Qty: 90 TABLET | Refills: 1 | OUTPATIENT
Start: 2021-10-18

## 2021-10-18 RX ORDER — ROSUVASTATIN CALCIUM 20 MG/1
20 TABLET, COATED ORAL DAILY
Qty: 90 TABLET | Refills: 3 | Status: SHIPPED | OUTPATIENT
Start: 2021-10-18 | End: 2022-09-19

## 2021-10-18 NOTE — TELEPHONE ENCOUNTER
1. Can patient have rosuvastatin instead of simvastatin?  2. Can you refill the hydrochlorothiazide?    Loli Ramirez RN, 10/18/2021 9:13 AM

## 2021-10-19 DIAGNOSIS — Z11.59 ENCOUNTER FOR SCREENING FOR OTHER VIRAL DISEASES: ICD-10-CM

## 2021-11-18 ENCOUNTER — LAB (OUTPATIENT)
Dept: URGENT CARE | Facility: URGENT CARE | Age: 61
End: 2021-11-18
Payer: COMMERCIAL

## 2021-11-18 DIAGNOSIS — Z11.59 ENCOUNTER FOR SCREENING FOR OTHER VIRAL DISEASES: ICD-10-CM

## 2021-11-18 PROCEDURE — U0005 INFEC AGEN DETEC AMPLI PROBE: HCPCS

## 2021-11-18 PROCEDURE — U0003 INFECTIOUS AGENT DETECTION BY NUCLEIC ACID (DNA OR RNA); SEVERE ACUTE RESPIRATORY SYNDROME CORONAVIRUS 2 (SARS-COV-2) (CORONAVIRUS DISEASE [COVID-19]), AMPLIFIED PROBE TECHNIQUE, MAKING USE OF HIGH THROUGHPUT TECHNOLOGIES AS DESCRIBED BY CMS-2020-01-R: HCPCS

## 2021-11-19 LAB — SARS-COV-2 RNA RESP QL NAA+PROBE: NEGATIVE

## 2021-11-22 ENCOUNTER — HOSPITAL ENCOUNTER (OUTPATIENT)
Facility: AMBULATORY SURGERY CENTER | Age: 61
Discharge: HOME | End: 2021-11-22
Attending: SURGERY | Admitting: SURGERY
Payer: COMMERCIAL

## 2021-11-22 VITALS
TEMPERATURE: 97.8 F | RESPIRATION RATE: 16 BRPM | DIASTOLIC BLOOD PRESSURE: 82 MMHG | HEART RATE: 72 BPM | OXYGEN SATURATION: 98 % | SYSTOLIC BLOOD PRESSURE: 136 MMHG

## 2021-11-22 LAB — COLONOSCOPY: NORMAL

## 2021-11-22 PROCEDURE — G8907 PT DOC NO EVENTS ON DISCHARG: HCPCS

## 2021-11-22 PROCEDURE — 45385 COLONOSCOPY W/LESION REMOVAL: CPT | Mod: PT | Performed by: SURGERY

## 2021-11-22 PROCEDURE — 45385 COLONOSCOPY W/LESION REMOVAL: CPT

## 2021-11-22 PROCEDURE — G8918 PT W/O PREOP ORDER IV AB PRO: HCPCS

## 2021-11-22 PROCEDURE — 99152 MOD SED SAME PHYS/QHP 5/>YRS: CPT | Mod: 59 | Performed by: SURGERY

## 2021-11-22 PROCEDURE — 88305 TISSUE EXAM BY PATHOLOGIST: CPT | Performed by: PATHOLOGY

## 2021-11-22 RX ORDER — FENTANYL CITRATE 50 UG/ML
INJECTION, SOLUTION INTRAMUSCULAR; INTRAVENOUS PRN
Status: DISCONTINUED | OUTPATIENT
Start: 2021-11-22 | End: 2021-11-22 | Stop reason: HOSPADM

## 2021-11-22 RX ORDER — LIDOCAINE 40 MG/G
CREAM TOPICAL
Status: DISCONTINUED | OUTPATIENT
Start: 2021-11-22 | End: 2021-11-23 | Stop reason: HOSPADM

## 2021-11-22 RX ORDER — ONDANSETRON 2 MG/ML
4 INJECTION INTRAMUSCULAR; INTRAVENOUS
Status: DISCONTINUED | OUTPATIENT
Start: 2021-11-22 | End: 2021-11-23 | Stop reason: HOSPADM

## 2021-11-22 NOTE — H&P
[facility] Anesthesia Pre-op History and Physical    Erik Youngblood MRN# [id number]   Age: 61 year old YOB: 1960      Date of Surgery: 11/22/2021     Date of Exam 11/22/2021                Chief Complaint and/or Reason for Procedure:   Conscious sedation         Active problem list:     Patient Active Problem List    Diagnosis Date Noted     Trigger finger, right middle finger 07/21/2021     Priority: Medium     Added automatically from request for surgery [id number]       Advance care planning 05/26/2016     Priority: Medium     Advance Care Planning 5/19/2016: ACP Review of Chart / Resources Provided:  Reviewed chart for advance care plan.  Erik Youngblood has been provided information and resources to begin or update their advance care plan.  Added by Christy Jon             Overweight (BMI 25.0-29.9) 05/19/2016     Priority: Medium     Essential hypertension with goal blood pressure less than 140/90 05/19/2016     Priority: Medium     Hyperlipidemia LDL goal <130 05/19/2016     Priority: Medium     Prostate cancer (H) 05/19/2016     Priority: Medium     Status post prostatectomy 05/19/2016     Priority: Medium     History of adenomatous polyp of colon 05/19/2016     Priority: Medium     Advanced directives, counseling/discussion 05/19/2016     Priority: Medium     Discussed advance care planning with patient; information given to patient to review. May 19, 2016        Spinal stenosis 05/01/1993     Priority: Medium            Medications (include herbals and vitamins):      Current Outpatient Medications   Medication Sig     amLODIPine (NORVASC) 10 MG tablet TAKE ONE TABLET BY MOUTH ONCE DAILY (NEED TO BE SEEN IN CLINIC FOR FURTHER REFILLS)     hydrochlorothiazide (HYDRODIURIL) 25 MG tablet TAKE ONE TABLET BY MOUTH EVERY DAY FOR BLOOD PRESSURE     methylcellulose, laxative, (CITRUCEL) POWD Take by mouth daily     Multiple Vitamins-Minerals (CENTRUM SILVER 50+MEN PO)      rosuvastatin  (CRESTOR) 20 MG tablet Take 1 tablet (20 mg) by mouth daily     Current Facility-Administered Medications   Medication     lidocaine (LMX4) kit     lidocaine 1 % 0.1-1 mL     ondansetron (ZOFRAN) injection 4 mg     sodium chloride (PF) 0.9% PF flush 3 mL     sodium chloride (PF) 0.9% PF flush 3 mL             Allergies:      Allergies   Allergen Reactions     Lisinopril Hives, Swelling and Other (See Comments)     Angioedema?     Penicillins Hives and Swelling               Physical Exam:   All vitals have been reviewed  Patient Vitals for the past 8 hrs:   BP Temp Temp src Resp SpO2   11/22/21 0849 (!) 160/88 97.6  F (36.4  C) Temporal 18 98 %     No intake/output data recorded.  Airway assessment:   Patient is able to open mouth wide  Patient is able to stick out tongue}      Lungs:   No increased work of breathing, good air exchange, clear to auscultation bilaterally, no crackles or wheezing     Cardiovascular:   regular rate and rhythm and normal S1 and S2             Anesthetic risk and/or ASA classification:     ASA classification 2    Chacho Holguin, DO

## 2021-11-24 LAB
PATH REPORT.COMMENTS IMP SPEC: NORMAL
PATH REPORT.FINAL DX SPEC: NORMAL
PATH REPORT.GROSS SPEC: NORMAL
PATH REPORT.MICROSCOPIC SPEC OTHER STN: NORMAL
PATH REPORT.RELEVANT HX SPEC: NORMAL
PHOTO IMAGE: NORMAL

## 2022-08-21 ENCOUNTER — E-VISIT (OUTPATIENT)
Dept: FAMILY MEDICINE | Facility: CLINIC | Age: 62
End: 2022-08-21
Payer: COMMERCIAL

## 2022-08-21 DIAGNOSIS — Z91.030 BEE STING ALLERGY: Primary | ICD-10-CM

## 2022-08-21 PROCEDURE — 99207 PR NO CHARGE LOS: CPT | Performed by: NURSE PRACTITIONER

## 2022-08-22 ENCOUNTER — OFFICE VISIT (OUTPATIENT)
Dept: FAMILY MEDICINE | Facility: CLINIC | Age: 62
End: 2022-08-22
Payer: COMMERCIAL

## 2022-08-22 VITALS
BODY MASS INDEX: 30.18 KG/M2 | DIASTOLIC BLOOD PRESSURE: 80 MMHG | TEMPERATURE: 98.1 F | OXYGEN SATURATION: 99 % | SYSTOLIC BLOOD PRESSURE: 138 MMHG | HEIGHT: 71 IN | HEART RATE: 71 BPM | RESPIRATION RATE: 17 BRPM | WEIGHT: 215.6 LBS

## 2022-08-22 DIAGNOSIS — T63.441A BEE STING REACTION, ACCIDENTAL OR UNINTENTIONAL, INITIAL ENCOUNTER: Primary | ICD-10-CM

## 2022-08-22 PROCEDURE — 99213 OFFICE O/P EST LOW 20 MIN: CPT | Performed by: PHYSICIAN ASSISTANT

## 2022-08-22 RX ORDER — EPINEPHRINE 0.3 MG/.3ML
0.3 INJECTION SUBCUTANEOUS PRN
Qty: 2 EACH | Refills: 1 | Status: SHIPPED | OUTPATIENT
Start: 2022-08-22

## 2022-08-22 ASSESSMENT — PAIN SCALES - GENERAL: PAINLEVEL: NO PAIN (0)

## 2022-08-22 NOTE — PROGRESS NOTES
"  Assessment & Plan   Problem List Items Addressed This Visit    None     Visit Diagnoses     Bee sting reaction, accidental or unintentional, initial encounter    -  Primary    Relevant Medications    EPINEPHrine (ANY BX GENERIC EQUIV) 0.3 MG/0.3ML injection 2-pack         Mostly resolved,  localized reaction      15 minutes spent on the date of the encounter doing chart review, history and exam, documentation and further activities per the note       BMI:   Estimated body mass index is 30.07 kg/m  as calculated from the following:    Height as of this encounter: 1.803 m (5' 11\").    Weight as of this encounter: 97.8 kg (215 lb 9.6 oz).   Weight management plan: Discussed healthy diet and exercise guidelines        Return in about 3 months (around 11/22/2022) for Routine preventive.    Jonelle Hays PA-C  [facility]    Lupe Batista is a 62 year old, presenting for the following health issues:  Insect Bites (Right arm- Saturday 8/20)      History of Present Illness       Reason for visit:  Bee Sting  Symptom onset:  1-3 days ago  Symptoms include:  Swollen Wrist  Symptom intensity:  Severe  Symptom progression:  Improving  Had these symptoms before:  Yes  Has tried/received treatment for these symptoms:  Yes  Previous treatment was successful:  Yes  Prior treatment description:  EpiPen  What makes it worse:  Not treating it at all  What makes it better:  Benadryl and Tylenol    He eats 0-1 servings of fruits and vegetables daily.He consumes 1 sweetened beverage(s) daily.He exercises with enough effort to increase his heart rate 30 to 60 minutes per day.  He exercises with enough effort to increase his heart rate 7 days per week.   He is taking medications regularly.       Mostly resolved       Review of Systems   Constitutional, HEENT, cardiovascular, pulmonary, gi and gu systems are negative, except as otherwise noted.      Objective    /80   Pulse 71   Temp 98.1  F " "(36.7  C) (Tympanic)   Resp 17   Ht 1.803 m (5' 11\")   Wt 97.8 kg (215 lb 9.6 oz)   SpO2 99%   BMI 30.07 kg/m    Body mass index is 30.07 kg/m .  Physical Exam   GENERAL: healthy, alert and no distress  EYES: Eyes grossly normal to inspection, PERRL and conjunctivae and sclerae normal  HENT: ear canals and TM's normal, nose and mouth without ulcers or lesions  NECK: no adenopathy, no asymmetry, masses, or scars and thyroid normal to palpation  RESP: lungs clear to auscultation - no rales, rhonchi or wheezes  CV: regular rate and rhythm, normal S1 S2, no S3 or S4, no murmur, click or rub, no peripheral edema and peripheral pulses strong  ABDOMEN: soft, nontender, no hepatosplenomegaly, no masses and bowel sounds normal  MS: no gross musculoskeletal defects noted, no edema  SKIN: right hand bee sting-no surrounding erythema, mild swelling of the hand                     .  ..  "

## 2022-08-23 NOTE — PATIENT INSTRUCTIONS
Dear Lester,    Your Epipen was refilled at your visit yesterday.    Thanks for choosing us as your health care partner,    IRMA Joy CNP

## 2022-08-29 NOTE — PROGRESS NOTES
"  SUBJECTIVE:                                                    Erik Youngblood is a 56 year old male who presents to clinic today for the following health issues:    Hyperlipidemia Follow-Up      Rate your low fat/cholesterol diet?: fair    Taking statin?  Yes, no muscle aches from statin    Other lipid medications/supplements?:  none     Hypertension Follow-up      Outpatient blood pressures are being checked at home and store ([employer], [employer], [employer]).  Results are good. 132/78 at [employer]    Low Salt Diet: no added salt       Amount of exercise or physical activity: 3 days/week for an average of 45-60 minutes (4-5 miles a day of walking (treadmill or elliptical) and then some weight lifting).     Problems taking medications regularly: No    Medication side effects: none    Diet: low fat/cholesterol. Patient reports he is on a \"healthy kick\" and everything has been feeling really good.      Past medical, family, and social histories, medications, and allergies are reviewed and updated in Epic.     ROS:  C: NEGATIVE for fever, chills. POSITIVE for weight loss  E/M: NEGATIVE for ear, mouth and throat problems  R: NEGATIVE for significant cough or SOB  CV: NEGATIVE for chest pain, palpitations or peripheral edema  ROS otherwise negative      Any history above obtained by the Medical Assistant was reviewed by Dr. Chacho Garcia MD, and edited when necessary.    This document serves as a record of the services and decisions personally performed and made by Dr. Garcia. It was created on his behalf by Angy Mejia, a trained medical scribe. The creation of this document is based on the provider's statements to the medical scribe.  Angy Mejia February 10, 2017 10:16 AM   OBJECTIVE:                                                    /80 mmHg  Pulse 75  Temp(Src) 98.8  F (37.1  C) (Oral)  Ht 5' 11\" (1.803 m)  Wt 210 lb (95.255 kg)  BMI 29.30 kg/m2  SpO2 99%  Body mass index is 29.3 " 1. Have you been to the ER, urgent care clinic since your last visit? Hospitalized since your last visit? No    2. Have you seen or consulted any other health care providers outside of the [address] [place] [address] since your last visit? Include any pap smears or colon screening.  No kg/(m^2).  GENERAL: healthy, alert and no distress  EYES: Eyes grossly normal to inspection, PERRL and conjunctivae and sclerae normal  MS: no gross musculoskeletal defects noted, no edema  SKIN: no suspicious lesions or rashes  NEURO: Normal strength and tone, mentation intact and speech normal, cranial nerves 2-12 intact   PSYCH: mentation appears normal, affect normal/bright      ASSESSMENT/PLAN:                                                    (I10) Essential hypertension with goal blood pressure less than 140/90  (primary encounter diagnosis)  Comment: improved but not controlled. I will add metoprolol.  Plan: amLODIPine (NORVASC) 5 MG tablet,         chlorthalidone (HYGROTON) 25 MG tablet,         Potassium, Creatinine, metoprolol (TOPROL-XL)         50 MG 24 hr tablet        Recheck in 4 weeks, when back from [place]    (E78.5) Hyperlipidemia LDL goal <130  Comment: historically at goal. Future orders for fasting labs.  Plan: simvastatin (ZOCOR) 20 MG tablet, Lipid panel         reflex to direct LDL, ALT        Return for lab appointment in near future. Follow up in 6 months        The information in this document, created by the medical scribe for me, accurately reflects the services I personally performed and the decisions made by me. I have reviewed and approved this document for accuracy prior to leaving the patient care area.  Chacho Garcia MD

## 2022-09-19 DIAGNOSIS — E78.5 HYPERLIPIDEMIA LDL GOAL <130: ICD-10-CM

## 2022-09-19 DIAGNOSIS — I10 ESSENTIAL HYPERTENSION WITH GOAL BLOOD PRESSURE LESS THAN 140/90: ICD-10-CM

## 2022-09-19 RX ORDER — HYDROCHLOROTHIAZIDE 25 MG/1
TABLET ORAL
Qty: 90 TABLET | Refills: 0 | Status: SHIPPED | OUTPATIENT
Start: 2022-09-19 | End: 2022-12-09

## 2022-09-19 RX ORDER — ROSUVASTATIN CALCIUM 20 MG/1
TABLET, COATED ORAL
Qty: 90 TABLET | Refills: 3 | Status: SHIPPED | OUTPATIENT
Start: 2022-09-19 | End: 2023-11-29

## 2022-09-19 RX ORDER — AMLODIPINE BESYLATE 10 MG/1
TABLET ORAL
Qty: 90 TABLET | Refills: 0 | Status: SHIPPED | OUTPATIENT
Start: 2022-09-19 | End: 2022-12-09

## 2022-09-23 ENCOUNTER — LAB (OUTPATIENT)
Dept: LAB | Facility: CLINIC | Age: 62
End: 2022-09-23
Payer: COMMERCIAL

## 2022-09-23 DIAGNOSIS — E78.5 HYPERLIPIDEMIA LDL GOAL <130: ICD-10-CM

## 2022-09-23 DIAGNOSIS — I10 ESSENTIAL HYPERTENSION WITH GOAL BLOOD PRESSURE LESS THAN 140/90: ICD-10-CM

## 2022-09-23 LAB
ALT SERPL W P-5'-P-CCNC: 52 U/L (ref 0–70)
ANION GAP SERPL CALCULATED.3IONS-SCNC: 4 MMOL/L (ref 3–14)
AST SERPL W P-5'-P-CCNC: 47 U/L (ref 0–45)
BUN SERPL-MCNC: 13 MG/DL (ref 7–30)
CALCIUM SERPL-MCNC: 10 MG/DL (ref 8.5–10.1)
CHLORIDE BLD-SCNC: 100 MMOL/L (ref 94–109)
CHOLEST SERPL-MCNC: 106 MG/DL
CO2 SERPL-SCNC: 33 MMOL/L (ref 20–32)
CREAT SERPL-MCNC: 1.09 MG/DL (ref 0.66–1.25)
FASTING STATUS PATIENT QL REPORTED: YES
GFR SERPL CREATININE-BSD FRML MDRD: 76 ML/MIN/1.73M2
GLUCOSE BLD-MCNC: 111 MG/DL (ref 70–99)
HDLC SERPL-MCNC: 52 MG/DL
LDLC SERPL CALC-MCNC: 38 MG/DL
NONHDLC SERPL-MCNC: 54 MG/DL
POTASSIUM BLD-SCNC: 3.6 MMOL/L (ref 3.4–5.3)
SODIUM SERPL-SCNC: 137 MMOL/L (ref 133–144)
TRIGL SERPL-MCNC: 82 MG/DL

## 2022-09-23 PROCEDURE — 80061 LIPID PANEL: CPT

## 2022-09-23 PROCEDURE — 80048 BASIC METABOLIC PNL TOTAL CA: CPT

## 2022-09-23 PROCEDURE — 36415 COLL VENOUS BLD VENIPUNCTURE: CPT

## 2022-09-23 PROCEDURE — 84450 TRANSFERASE (AST) (SGOT): CPT

## 2022-09-23 PROCEDURE — 84460 ALANINE AMINO (ALT) (SGPT): CPT

## 2022-10-22 ENCOUNTER — HEALTH MAINTENANCE LETTER (OUTPATIENT)
Age: 62
End: 2022-10-22

## 2022-12-04 ENCOUNTER — HEALTH MAINTENANCE LETTER (OUTPATIENT)
Age: 62
End: 2022-12-04

## 2022-12-09 DIAGNOSIS — I10 ESSENTIAL HYPERTENSION WITH GOAL BLOOD PRESSURE LESS THAN 140/90: ICD-10-CM

## 2022-12-09 RX ORDER — HYDROCHLOROTHIAZIDE 25 MG/1
TABLET ORAL
Qty: 90 TABLET | Refills: 0 | Status: SHIPPED | OUTPATIENT
Start: 2022-12-09 | End: 2023-04-03

## 2022-12-09 RX ORDER — AMLODIPINE BESYLATE 10 MG/1
TABLET ORAL
Qty: 90 TABLET | Refills: 0 | Status: SHIPPED | OUTPATIENT
Start: 2022-12-09 | End: 2023-04-03

## 2023-03-15 ENCOUNTER — OFFICE VISIT (OUTPATIENT)
Dept: DERMATOLOGY | Facility: CLINIC | Age: 63
End: 2023-03-15
Payer: COMMERCIAL

## 2023-03-15 DIAGNOSIS — L81.8 IDIOPATHIC GUTTATE HYPOMELANOSIS: ICD-10-CM

## 2023-03-15 DIAGNOSIS — Z86.018 HISTORY OF DYSPLASTIC NEVUS: ICD-10-CM

## 2023-03-15 DIAGNOSIS — L82.0 SEBORRHEIC KERATOSIS, INFLAMED: Primary | ICD-10-CM

## 2023-03-15 DIAGNOSIS — D18.01 CHERRY ANGIOMA: ICD-10-CM

## 2023-03-15 DIAGNOSIS — D22.9 MULTIPLE MELANOCYTIC NEVI: ICD-10-CM

## 2023-03-15 DIAGNOSIS — L81.4 SOLAR LENTIGO: ICD-10-CM

## 2023-03-15 DIAGNOSIS — L82.1 SEBORRHEIC KERATOSES: ICD-10-CM

## 2023-03-15 PROCEDURE — 99203 OFFICE O/P NEW LOW 30 MIN: CPT | Mod: 25 | Performed by: DERMATOLOGY

## 2023-03-15 PROCEDURE — 17110 DESTRUCTION B9 LES UP TO 14: CPT | Performed by: DERMATOLOGY

## 2023-03-15 NOTE — LETTER
3/15/2023         RE: Erik Youngblood  [address]  [facility] [address]        Dear Colleague,    Thank you for referring your patient, Erik Youngblood, to the [facility]. Please see a copy of my visit note below.    [facility] Dermatology Note  Encounter Date: Mar 15, 2023  Office Visit     Dermatology Problem List:  Last skin check 3/15/23, recommended yearly  1. History of compound dysplastic nevus with mild atypia, s/p excision 11/1/18  2. Inflamed seborrheic keratoses, s/p cryo  3. Lesion to monitor  - Hyperpigmented thin papule on the right upper chest  ____________________________________________    Assessment & Plan:    1. History of dysplastic nevi, no clinical evidence of recurrence.  - ABCDEs: Counseled ABCDEs of melanoma: Asymmetry, Border (irregularity), Color (not uniform, changes in color), Diameter (greater than 6 mm which is about the size of a pencil eraser), and Evolving (any changes in preexisting moles).  - Sun protection: Counseled SPF30+ sunscreen, UPF clothing, sun avoidance, tanning bed avoidance.  - Recommended regular skin checks.      2. Hyperpigmented thin papule on the right upper chest. Recommended monitoring, returning to clinic if noticing any changes.   - Photograph obtained for reference.   - Monitor for changes    3. Seborrheic keratosis, symptomatic - right temple x 2. Based on the location and chronic irritation to this lesion, treatment with cryotherapy is warranted.   - See cryo procedure note.     4. Multiple clinically banal-appearing melanocytic nevi: Chronic, stable  - No further intervention required. Patient to report changes.   - Patient reassured of the benign nature of these lesions.    5. Benign findings including: seborrheic keratoses, solar lentigines, idiopathic guttate hypomelanosis, cherry angioma: minor, self limited problems.  - No further intervention required. Patient to report changes.   - Patient  reassured of the benign nature of these lesions.      Procedures Performed:   - Cryotherapy procedure note, location(s): right temple. After verbal consent and discussion of risks and benefits including, but not limited to, dyspigmentation/scar, blister, and pain, 2 ISK(s) was(were) treated with 1-2 mm freeze border for 1-2 cycles with liquid nitrogen. Post cryotherapy instructions were provided.      Follow-up: 6 months for a spot check, 1 year(s) in-person for a skin check, or earlier for new or changing lesions    Staff and Scribe:     Scribe Disclosure:   I, Kyle John, am serving as a scribe to document services personally performed by this physician, Dr. Mike Mcmillan, based on data collection and the provider's statements to me.       Provider Disclosure:   The documentation recorded by the scribe accurately reflects the services I personally performed and the decisions made by me.    Mike Mcmillna MD   of Dermatology  Department of Dermatology  [facility] [employer] [facility]      ____________________________________________    [place] [place]/personal hx of skin cancers.)    HPI:  Mr. Erik Youngblood is a(n) 62 year old male who presents today as a return patient for a skin check.    Last seen 10/18/19 by Dr. Sloan for a skin check. At that time, 2 SKs were treated with cryotherapy.     Today, he has no areas of concern.     Patient is otherwise feeling well, without additional skin concerns.    Labs Reviewed:  N/A    Physical Exam:  Vitals: There were no vitals taken for this visit.  SKIN: Total skin excluding the undergarment areas was performed. The exam included the head/face, neck, both arms, chest, back, abdomen, both legs, digits and/or nails.   - Well healed scar at site of previous DN, no repigmentation or nodularity noted.   - There are dome shaped bright red papules on the trunk and extremities.   - Multiple regular brown pigmented  macules and papules are identified on the trunk and extremities.   - Scattered brown macules on sun exposed areas.  - There are waxy stuck on tan to brown papules on the trunk and extremities.      - There are tan to brown waxy stuck on papules with surrounding erythema on the right temple x 2.    - Hyperpigmented thin papule on the right upper chest.  - No other lesions of concern on areas examined.     Medications:  Current Outpatient Medications   Medication     amLODIPine (NORVASC) 10 MG tablet     EPINEPHrine (ANY BX GENERIC EQUIV) 0.3 MG/0.3ML injection 2-pack     hydrochlorothiazide (HYDRODIURIL) 25 MG tablet     methylcellulose (CITRUCEL) powder     rosuvastatin (CRESTOR) 20 MG tablet     Multiple Vitamins-Minerals (CENTRUM SILVER 50+MEN PO)     No current facility-administered medications for this visit.      Past Medical History:   Patient Active Problem List   Diagnosis     Overweight (BMI 25.0-29.9)     Essential hypertension with goal blood pressure less than 140/90     Hyperlipidemia LDL goal <130     Prostate cancer (H)     Status post prostatectomy     History of adenomatous polyp of colon     Advanced directives, counseling/discussion     Advance care planning     Spinal stenosis     Trigger finger, right middle finger     Past Medical History:   Diagnosis Date     Adenomatous colon polyp      Allergic conjunctivitis      Cancer (H) 12/22/14    Prostate Cancer / Removed     Complication of anesthesia     nausea     Hypercholesterolemia      Hypertension      Obesity, Class I, BMI 30-34.9 5/19/2016     Spinal stenosis      Trigger finger, right middle finger         CC Referred Self, MD  No address on file on close of this encounter.      Again, thank you for allowing me to participate in the care of your patient.        Sincerely,        Mike Mcmillan MD

## 2023-03-15 NOTE — NURSING NOTE
Erik Youngblood's chief complaint for this visit includes:    Chief Complaint   Patient presents with     Derm Problem     FBS. No family/personal hx of skin cancers.       PCP: Fauzia Yepez         Referring Provider:    Referred Self, MD  No address on file         There were no vitals taken for this visit.    Data Unavailable            Allergies   Allergen Reactions     Bee Venom Shortness Of Breath and Swelling     Lisinopril Hives, Swelling and Other (See Comments)     Angioedema?     Penicillins Hives and Swelling                 Do you need any medication refills at today's visit? No    Kacey French LPN on 3/15/2023 at 11:31 AM

## 2023-03-15 NOTE — PATIENT INSTRUCTIONS
Cryotherapy    What is it?  Use of a very cold liquid, such as liquid nitrogen, to freeze and destroy abnormal skin cells that need to be removed    What should I expect?  Tenderness and redness  A small blister that might grow and fill with dark purple blood. There may be crusting.  More than one treatment may be needed if the lesions do not go away.    How do I care for the treated area?  Gently wash the area with your hands when bathing.  Use a thin layer of Vaseline to help with healing. You may use a Band-Aid.   The area should heal within 7-10 days and may leave behind a pink or lighter color.   Do not use an antibiotic or Neosporin ointment.   You may take acetaminophen (Tylenol) for pain.     Call your doctor if you have:  Severe pain  Signs of infection (warmth, redness, cloudy yellow drainage, and or a bad smell)  Questions or concerns    Who should I call with questions?      [facility]: [phone]      [facility]: [phone]      For urgent needs outside of business hours call the [facility] at [phone] and ask for the dermatology resident on call         Patient Education     Checking for Skin Cancer  You can find cancer early by checking your skin each month. There are 3 kinds of skin cancer. They are melanoma, basal cell carcinoma, and squamous cell carcinoma. Doing monthly skin checks is the best way to find new marks or skin changes. Follow the instructions below for checking your skin.   The ABCDEs of checking moles for melanoma   Check your moles or growths for signs of melanoma using ABCDE:   Asymmetry: the sides of the mole or growth don t match  Border: the edges are ragged, notched, or blurred  Color: the color within the mole or growth varies  Diameter: the mole or growth is larger than 6 mm (size of a pencil eraser)  Evolving: the size, shape, or color of the mole or growth is changing (evolving is not shown in  the images below)    Checking for other types of skin cancer  Basal cell carcinoma or squamous cell carcinoma have symptoms such as:     A spot or mole that looks different from all other marks on your skin  Changes in how an area feels, such as itching, tenderness, or pain  Changes in the skin's surface, such as oozing, bleeding, or scaliness  A sore that does not heal  New swelling or redness beyond the border of a mole    Who s at risk?  Anyone can get skin cancer. But you are at greater risk if you have:   Fair skin, light-colored hair, or light-colored eyes  Many moles or abnormal moles on your skin  A history of sunburns from sunlight or tanning beds  A family history of skin cancer  A history of exposure to radiation or chemicals  A weakened immune system  If you have had skin cancer in the past, you are at risk for recurring skin cancer.   How to check your skin  Do your monthly skin checkups in front of a full-length mirror. Check all parts of your body, including your:   Head (ears, face, neck, and scalp)  Torso (front, back, and sides)  Arms (tops, undersides, upper, and lower armpits)  Hands (palms, backs, and fingers, including under the nails)  Buttocks and genitals  Legs (front, back, and sides)  Feet (tops, soles, toes, including under the nails, and between toes)  If you have a lot of moles, take digital photos of them each month. Make sure to take photos both up close and from a distance. These can help you see if any moles change over time.   Most skin changes are not cancer. But if you see any changes in your skin, call your doctor right away. Only he or she can diagnose a problem. If you have skin cancer, seeing your doctor can be the first step toward getting the treatment that could save your life.   [employer] last reviewed this educational content on 4/1/2019 2000-2020 [employer]. [address] [place] [address], [place] [address]. All rights reserved. This information is not intended  as a substitute for professional medical care. Always follow your healthcare professional's instructions.       When should I call my doctor?  If you are worsening or not improving, please, contact us or seek urgent care as noted below.     Who should I call with questions (adults)?  [facility] (adult and pediatric): [phone]  [facility] (adult): [phone]  For urgent needs outside of business hours call the [facility] at [phone] and ask for the dermatology resident on call to be paged  If this is a medical emergency and you are unable to reach an ER, Call 911    Who should I call with questions (pediatric)?  [facility]- Pediatric Dermatology  Dr. Barbara Cota, Dr. Arelis Solorzano, Dr. Su Reyes, VILMA Beasley, Dr. Barbara Monge, Dr. Loli Alexandre & Dr. Mike Castillo  Non-urgent nurse triage line; [phone]- Criselda and Gloria MARTÍNEZ Care Coordinators   Loyda (/Complex ) [phone]    If you need a prescription refill, please contact your pharmacy. Refills are approved or denied by our Physicians during normal business hours, Monday through Fridays  Per office policy, refills will not be granted if you have not been seen within the past year (or sooner depending on your child's condition)    Scheduling Information:  Pediatric Appointment Scheduling and Call Center [phone]  Radiology Scheduling- [phone]  Sedation Unit Scheduling- [phone]  [place] Scheduling- General [phone]; Pediatric Dermatology [phone]  Main  Services: [phone]  Kiswahili: [phone]  Citizen of [place]: [phone]  Hmong/Salvadorean/Paraguayan: [phone]  Preadmission Nursing Department Fax Number: [phone] (Fax all pre-operative paperwork to this number)    For urgent matters arising during evenings, weekends, or holidays that cannot wait for normal business hours  please call [phone] and ask for the dermatology resident on call to be paged.

## 2023-03-15 NOTE — PROGRESS NOTES
[facility] Dermatology Note  Encounter Date: Mar 15, 2023  Office Visit     Dermatology Problem List:  Last skin check 3/15/23, recommended yearly  1. History of compound dysplastic nevus with mild atypia, s/p excision 11/1/18  2. Inflamed seborrheic keratoses, s/p cryo  3. Lesion to monitor  - Hyperpigmented thin papule on the right upper chest  ____________________________________________    Assessment & Plan:    1. History of dysplastic nevi, no clinical evidence of recurrence.  - ABCDEs: Counseled ABCDEs of melanoma: Asymmetry, Border (irregularity), Color (not uniform, changes in color), Diameter (greater than 6 mm which is about the size of a pencil eraser), and Evolving (any changes in preexisting moles).  - Sun protection: Counseled SPF30+ sunscreen, UPF clothing, sun avoidance, tanning bed avoidance.  - Recommended regular skin checks.      2. Hyperpigmented thin papule on the right upper chest. Recommended monitoring, returning to clinic if noticing any changes.   - Photograph obtained for reference.   - Monitor for changes    3. Seborrheic keratosis, symptomatic - right temple x 2. Based on the location and chronic irritation to this lesion, treatment with cryotherapy is warranted.   - See cryo procedure note.     4. Multiple clinically banal-appearing melanocytic nevi: Chronic, stable  - No further intervention required. Patient to report changes.   - Patient reassured of the benign nature of these lesions.    5. Benign findings including: seborrheic keratoses, solar lentigines, idiopathic guttate hypomelanosis, cherry angioma: minor, self limited problems.  - No further intervention required. Patient to report changes.   - Patient reassured of the benign nature of these lesions.      Procedures Performed:   - Cryotherapy procedure note, location(s): right temple. After verbal consent and discussion of risks and benefits including, but not limited to, dyspigmentation/scar, blister,  and pain, 2 ISK(s) was(were) treated with 1-2 mm freeze border for 1-2 cycles with liquid nitrogen. Post cryotherapy instructions were provided.      Follow-up: 6 months for a spot check, 1 year(s) in-person for a skin check, or earlier for new or changing lesions    Staff and Scribe:     Scribe Disclosure:   I, Kyle Lopez, am serving as a scribe to document services personally performed by this physician, Dr. Mike Mcmillan, based on data collection and the provider's statements to me.       Provider Disclosure:   The documentation recorded by the scribe accurately reflects the services I personally performed and the decisions made by me.    Mike Mcmillan MD   of Dermatology  Department of Dermatology  [facility]      ____________________________________________    CC: Derm Problem (FBSC. No family/personal hx of skin cancers.)    HPI:  Mr. Erik Youngblood is a(n) 62 year old male who presents today as a return patient for a skin check.    Last seen 10/18/19 by Dr. Sloan for a skin check. At that time, 2 SKs were treated with cryotherapy.     Today, he has no areas of concern.     Patient is otherwise feeling well, without additional skin concerns.    Labs Reviewed:  N/A    Physical Exam:  Vitals: There were no vitals taken for this visit.  SKIN: Total skin excluding the undergarment areas was performed. The exam included the head/face, neck, both arms, chest, back, abdomen, both legs, digits and/or nails.   - Well healed scar at site of previous DN, no repigmentation or nodularity noted.   - There are dome shaped bright red papules on the trunk and extremities.   - Multiple regular brown pigmented macules and papules are identified on the trunk and extremities.   - Scattered brown macules on sun exposed areas.  - There are waxy stuck on tan to brown papules on the trunk and extremities.      - There are tan to brown waxy stuck on papules with  surrounding erythema on the right temple x 2.    - Hyperpigmented thin papule on the right upper chest.  - No other lesions of concern on areas examined.     Medications:  Current Outpatient Medications   Medication     amLODIPine (NORVASC) 10 MG tablet     EPINEPHrine (ANY BX GENERIC EQUIV) 0.3 MG/0.3ML injection 2-pack     hydrochlorothiazide (HYDRODIURIL) 25 MG tablet     methylcellulose (CITRUCEL) powder     rosuvastatin (CRESTOR) 20 MG tablet     Multiple Vitamins-Minerals (CENTRUM SILVER 50+MEN PO)     No current facility-administered medications for this visit.      Past Medical History:   Patient Active Problem List   Diagnosis     Overweight (BMI 25.0-29.9)     Essential hypertension with goal blood pressure less than 140/90     Hyperlipidemia LDL goal <130     Prostate cancer (H)     Status post prostatectomy     History of adenomatous polyp of colon     Advanced directives, counseling/discussion     Advance care planning     Spinal stenosis     Trigger finger, right middle finger     Past Medical History:   Diagnosis Date     Adenomatous colon polyp      Allergic conjunctivitis      Cancer (H) 12/22/14    Prostate Cancer / Removed     Complication of anesthesia     nausea     Hypercholesterolemia      Hypertension      Obesity, Class I, BMI 30-34.9 5/19/2016     Spinal stenosis      Trigger finger, right middle finger         CC Referred Self, MD  No address on file on close of this encounter.

## 2023-04-03 ENCOUNTER — MYC REFILL (OUTPATIENT)
Dept: FAMILY MEDICINE | Facility: CLINIC | Age: 63
End: 2023-04-03
Payer: COMMERCIAL

## 2023-04-03 DIAGNOSIS — I10 ESSENTIAL HYPERTENSION WITH GOAL BLOOD PRESSURE LESS THAN 140/90: ICD-10-CM

## 2023-04-04 RX ORDER — HYDROCHLOROTHIAZIDE 25 MG/1
TABLET ORAL
Qty: 90 TABLET | Refills: 0 | Status: SHIPPED | OUTPATIENT
Start: 2023-04-04 | End: 2023-05-02

## 2023-04-04 RX ORDER — AMLODIPINE BESYLATE 10 MG/1
10 TABLET ORAL DAILY
Qty: 90 TABLET | Refills: 0 | Status: SHIPPED | OUTPATIENT
Start: 2023-04-04 | End: 2023-05-02

## 2023-05-02 ENCOUNTER — OFFICE VISIT (OUTPATIENT)
Dept: FAMILY MEDICINE | Facility: CLINIC | Age: 63
End: 2023-05-02
Payer: COMMERCIAL

## 2023-05-02 VITALS
BODY MASS INDEX: 29.15 KG/M2 | RESPIRATION RATE: 18 BRPM | DIASTOLIC BLOOD PRESSURE: 81 MMHG | HEIGHT: 71 IN | TEMPERATURE: 97 F | OXYGEN SATURATION: 100 % | SYSTOLIC BLOOD PRESSURE: 156 MMHG | HEART RATE: 73 BPM | WEIGHT: 208.2 LBS

## 2023-05-02 DIAGNOSIS — I10 ESSENTIAL HYPERTENSION WITH GOAL BLOOD PRESSURE LESS THAN 140/90: ICD-10-CM

## 2023-05-02 DIAGNOSIS — E66.3 OVERWEIGHT (BMI 25.0-29.9): ICD-10-CM

## 2023-05-02 DIAGNOSIS — Z00.00 ROUTINE HISTORY AND PHYSICAL EXAMINATION OF ADULT: Primary | ICD-10-CM

## 2023-05-02 DIAGNOSIS — C61 PROSTATE CANCER (H): ICD-10-CM

## 2023-05-02 DIAGNOSIS — Z90.79 STATUS POST PROSTATECTOMY: ICD-10-CM

## 2023-05-02 DIAGNOSIS — Z01.00 VISIT FOR EYE AND VISION EXAM: ICD-10-CM

## 2023-05-02 DIAGNOSIS — E78.5 HYPERLIPIDEMIA LDL GOAL <130: ICD-10-CM

## 2023-05-02 PROCEDURE — 99396 PREV VISIT EST AGE 40-64: CPT | Performed by: NURSE PRACTITIONER

## 2023-05-02 PROCEDURE — 99214 OFFICE O/P EST MOD 30 MIN: CPT | Mod: 25 | Performed by: NURSE PRACTITIONER

## 2023-05-02 RX ORDER — AMLODIPINE BESYLATE 10 MG/1
10 TABLET ORAL DAILY
Qty: 90 TABLET | Refills: 3 | Status: SHIPPED | OUTPATIENT
Start: 2023-05-02 | End: 2024-05-14

## 2023-05-02 RX ORDER — HYDROCHLOROTHIAZIDE 25 MG/1
TABLET ORAL
Qty: 90 TABLET | Refills: 3 | Status: SHIPPED | OUTPATIENT
Start: 2023-05-02 | End: 2024-05-14

## 2023-05-02 ASSESSMENT — PAIN SCALES - GENERAL: PAINLEVEL: NO PAIN (0)

## 2023-05-02 NOTE — PROGRESS NOTES
SUBJECTIVE:   CC: Lester is an 63 year old who presents for preventative health visit.       5/2/2023     8:10 AM   Additional Questions   Roomed by daily         5/2/2023     8:10 AM   Patient Reported Additional Medications   Patient reports taking the following new medications none   Patient has been advised of split billing requirements and indicates understanding: Yes  HPI        Hyperlipidemia Follow-Up      Are you regularly taking any medication or supplement to lower your cholesterol?   Yes- Crestor 20 mg daily    Are you having muscle aches or other side effects that you think could be caused by your cholesterol lowering medication?  No    Hypertension Follow-up      Do you check your blood pressure regularly outside of the clinic? No     Are you following a low salt diet? Yes    Are your blood pressures ever more than 140 on the top number (systolic) OR more   than 90 on the bottom number (diastolic), for example 140/90? Yes      Today's PHQ-2 Score:       5/2/2023     8:04 AM   PHQ-2 ( 1999 [employer])   PHQ-2 Score Incomplete       Have you ever done Advance Care Planning? (For example, a Health Directive, POLST, or a discussion with a medical provider or your loved ones about your wishes): Yes, advance care planning is on file.    Social History     Tobacco Use     Smoking status: Never     Smokeless tobacco: Never   Vaping Use     Vaping status: Not on file   Substance Use Topics     Alcohol use: Yes     Alcohol/week: 0.0 standard drinks of alcohol     Comment: rare              View : No data to display.                Last PSA:   PSA   Date Value Ref Range Status   12/19/2019 <0.01 0 - 4 ug/L Final     Comment:     Assay Method:  Chemiluminescence using Siemens Vista analyzer     Prostate Specific Antigen Screen   Date Value Ref Range Status   10/13/2021 0.02 0.00 - 4.00 ug/L Final       Reviewed orders with patient. Reviewed health maintenance and updated orders accordingly - Yes  Labs reviewed in EPIC  BP  Readings from Last 3 Encounters:   05/02/23 (!) 156/81   08/22/22 138/80   11/22/21 136/82    Wt Readings from Last 3 Encounters:   05/02/23 94.4 kg (208 lb 3.2 oz)   08/22/22 97.8 kg (215 lb 9.6 oz)   10/13/21 97.3 kg (214 lb 9.6 oz)                  Patient Active Problem List   Diagnosis     Overweight (BMI 25.0-29.9)     Essential hypertension with goal blood pressure less than 140/90     Hyperlipidemia LDL goal <130     Prostate cancer (H)     Status post prostatectomy     History of adenomatous polyp of colon     Advanced directives, counseling/discussion     Advance care planning     Spinal stenosis     Trigger finger, right middle finger     Past Surgical History:   Procedure Laterality Date     APPENDECTOMY       COLONOSCOPY  8/9/2016    All was good     COLONOSCOPY N/A 11/22/2021    Procedure: COLONOSCOPY, FLEXIBLE, WITH LESION REMOVAL USING SNARE;  Surgeon: Chacho Holguin DO;  Location: [facility]     COLONOSCOPY WITH CO2 INSUFFLATION N/A 8/9/2016    Procedure: COLONOSCOPY WITH CO2 INSUFFLATION;  Surgeon: Evan Brown MD;  Location: [facility]     COLONOSCOPY WITH CO2 INSUFFLATION N/A 11/22/2021    Procedure: COLONOSCOPY, WITH CO2 INSUFFLATION;  Surgeon: Chacho Holguin DO;  Location: [facility]     LAPAROSCOPIC PROSTATECTOMY, LYMPHADENECTOMY, COMBINED  3/20/15    robot assisted, Dr. Anton Alonso     RELEASE TRIGGER FINGER Right     Right 3rd trigger finger release.      RELEASE TRIGGER FINGER Right 9/15/2021    Procedure: RELEASE, RIGHT MIDDLE TRIGGER FINGER;  Surgeon: Sebastian Payan MD;  Location: [facility]     REPAIR PTOSIS       REPAIR PTOSIS BILATERAL  11/23/2012    Procedure: REPAIR PTOSIS BILATERAL;  BILATERAL UPPER LID MECHANICAL PTOSIS REPAIR AND BILATERAL BROWN PTOSIS;  Surgeon: Lowell Wood MD;  Location: [facility]     REPAIR PTOSIS BROW BILATERAL  11/23/2012    Procedure: REPAIR PTOSIS BROW BILATERAL;;  Surgeon: Lowell Wood MD;  Location: [facility]     VASECTOMY         Social  History     Tobacco Use     Smoking status: Never     Smokeless tobacco: Never   Vaping Use     Vaping status: Not on file   Substance Use Topics     Alcohol use: Yes     Alcohol/week: 0.0 standard drinks of alcohol     Comment: rare     Family History   Problem Relation Age of Onset     Hypertension Mother      Diabetes Mother      Hypertension Father      Prostate Cancer Father      Cancer Father      Hypertension Maternal Grandmother      Hypertension Maternal Grandfather      Hypertension Paternal Grandmother      Hypertension Paternal Grandfather      Hypertension Brother      Prostate Cancer Brother      Cancer Brother      Prostate Cancer Brother      Cerebrovascular Disease No family hx of      Thyroid Disease No family hx of      Glaucoma No family hx of      Macular Degeneration No family hx of            Reviewed and updated as needed this visit by clinical staff   Tobacco  Allergies  Meds              Reviewed and updated as needed this visit by Provider                 Past Medical History:   Diagnosis Date     Adenomatous colon polyp      Allergic conjunctivitis      Cancer (H) 12/22/14    Prostate Cancer / Removed     Complication of anesthesia     nausea     Hypercholesterolemia      Hypertension      Obesity, Class I, BMI 30-34.9 5/19/2016     Spinal stenosis      Trigger finger, right middle finger       Past Surgical History:   Procedure Laterality Date     APPENDECTOMY       COLONOSCOPY  8/9/2016    All was good     COLONOSCOPY N/A 11/22/2021    Procedure: COLONOSCOPY, FLEXIBLE, WITH LESION REMOVAL USING SNARE;  Surgeon: Chacho Holguin DO;  Location: [facility]     COLONOSCOPY WITH CO2 INSUFFLATION N/A 8/9/2016    Procedure: COLONOSCOPY WITH CO2 INSUFFLATION;  Surgeon: Evan Brown MD;  Location: [facility]     COLONOSCOPY WITH CO2 INSUFFLATION N/A 11/22/2021    Procedure: COLONOSCOPY, WITH CO2 INSUFFLATION;  Surgeon: Chacho Holguin DO;  Location: [facility]     LAPAROSCOPIC PROSTATECTOMY,  "LYMPHADENECTOMY, COMBINED  3/20/15    robot assisted, Dr. Anton Alonso     RELEASE TRIGGER FINGER Right     Right 3rd trigger finger release.      RELEASE TRIGGER FINGER Right 9/15/2021    Procedure: RELEASE, RIGHT MIDDLE TRIGGER FINGER;  Surgeon: Sebastian Payan MD;  Location: [facility]     REPAIR PTOSIS       REPAIR PTOSIS BILATERAL  11/23/2012    Procedure: REPAIR PTOSIS BILATERAL;  BILATERAL UPPER LID MECHANICAL PTOSIS REPAIR AND BILATERAL BROWN PTOSIS;  Surgeon: Lowell Wood MD;  Location: [facility]     REPAIR PTOSIS BROW BILATERAL  11/23/2012    Procedure: REPAIR PTOSIS BROW BILATERAL;;  Surgeon: Lowell Wood MD;  Location: [facility]     VASECTOMY         Review of Systems  CONSTITUTIONAL: NEGATIVE for fever, chills, change in weight  INTEGUMENTARY/SKIN: NEGATIVE for worrisome rashes, moles or lesions  EYES: NEGATIVE for vision changes or irritation  ENT: NEGATIVE for ear, mouth and throat problems  RESP: NEGATIVE for significant cough or SOB  CV: NEGATIVE for chest pain, palpitations or peripheral edema  GI: NEGATIVE for nausea, abdominal pain, heartburn, or change in bowel habits   male: negative for dysuria, hematuria, decreased urinary stream, erectile dysfunction, urethral discharge  MUSCULOSKELETAL: NEGATIVE for significant arthralgias or myalgia  NEURO: NEGATIVE for weakness, dizziness or paresthesias  PSYCHIATRIC: NEGATIVE for changes in mood or affect    OBJECTIVE:   BP (!) 157/83 (BP Location: Left arm, Patient Position: Sitting, Cuff Size: Adult Regular)   Pulse 75   Temp 97  F (36.1  C) (Tympanic)   Resp 18   Ht 1.797 m (5' 10.75\")   Wt 94.4 kg (208 lb 3.2 oz)   SpO2 100%   BMI 29.25 kg/m      Physical Exam  GENERAL: healthy, alert and no distress  EYES: Eyes grossly normal to inspection, PERRL and conjunctivae and sclerae normal  HENT: ear canals and TM's normal, nose and mouth without ulcers or lesions  NECK: no adenopathy, no asymmetry, masses, or scars and thyroid normal " to palpation  RESP: lungs clear to auscultation - no rales, rhonchi or wheezes  CV: regular rate and rhythm, normal S1 S2, no S3 or S4, no murmur, click or rub, no peripheral edema and peripheral pulses strong  ABDOMEN: soft, nontender, no hepatosplenomegaly, no masses and bowel sounds normal   (male): normal male genitalia without lesions or urethral discharge, no hernia  MS: no gross musculoskeletal defects noted, no edema  SKIN: no suspicious lesions or rashes  NEURO: Normal strength and tone, mentation intact and speech normal  PSYCH: mentation appears normal, affect normal/bright  LYMPH: no cervical, supraclavicular, axillary, or inguinal adenopathy    Diagnostic Test Results:  Labs reviewed in Epic  No results found for this or any previous visit (from the past 24 hour(s)).    ASSESSMENT/PLAN:   (Z00.00) Routine history and physical examination of adult  (primary encounter diagnosis)  Comment:   Plan: REVIEW OF HEALTH MAINTENANCE PROTOCOL ORDERS            (C61) Prostate cancer (H)  Comment: s/p prostatectomy- no urinary symptoms.  Plan:     (E78.5) Hyperlipidemia LDL goal <130  Comment: Recheck Lipits in September.  Plan: tolerating  Crestor well    (I10) Essential hypertension with goal blood pressure less than 140/90  Comment: BP elevated in clinic but normal at home, likely white coat HYPERTENSION, continue to monitor salt intake, get regular exercise, encouraged weight loss.  Plan: amLODIPine (NORVASC) 10 MG tablet,         hydrochlorothiazide (HYDRODIURIL) 25 MG tablet            (E66.3) Overweight (BMI 25.0-29.9)  Comment: Benefits of weight loss reviewed in detail, encouraged him to cut back on the carbohydrates in the diet, consume more fruits and vegetables, drink plenty of water, avoid fruit juices, sodas, get 150 min moderate exercise/week.    Plan: Recheck weight in 6 months.    (Z90.79) Status post prostatectomy  Comment:   Plan: Stable, last PSA 10/13/21 0.02, no urinary incontinence or  "ED    (Z01.00) Visit for eye and vision exam  Comment:   Plan: Adult Eye  Referral              Patient has been advised of split billing requirements and indicates understanding: Yes      COUNSELING:   Reviewed preventive health counseling, as reflected in patient instructions      BMI:   Estimated body mass index is 29.25 kg/m  as calculated from the following:    Height as of this encounter: 1.797 m (5' 10.75\").    Weight as of this encounter: 94.4 kg (208 lb 3.2 oz).   Weight management plan: Discussed healthy diet and exercise guidelines      He reports that he has never smoked. He has never used smokeless tobacco.        IRMA Joy CNP  [facility]  "

## 2023-05-02 NOTE — PATIENT INSTRUCTIONS
At [facility], we strive to deliver an exceptional experience to you, every time we see you. If you receive a survey, please complete it as we do value your feedback.  If you have MyChart, you can expect to receive results automatically within 24 hours of their completion.  Your provider will send a note interpreting your results as well.   If you do not have MyChart, you should receive your results in about a week by mail.    Your care team:                            Family Medicine Internal Medicine   MD Waylon Reyes MD Shantel Branch-Fleming, MD Srinivasa Vaka, MD Katya Belousova, PAIRMA Ramos CNP, MD ([facility]   Austin Stephens, MD Shante Flowers MD Amelia Massimini APRN NGUYEN Yepez APRN MD Kavya Friedman MD          Clinic hours: Monday - Thursday 7 am-6 pm; Fridays 7 am-5 pm.   Urgent care: Monday - Friday 10 am- 8 pm; Saturday and Sunday 9 am-5 pm.    Clinic: [phone]       [facility]: Monday - Thursday 8 am - 7 pm; Friday 8 am - 6 pm  [facility]: [phone]

## 2023-05-25 ENCOUNTER — OFFICE VISIT (OUTPATIENT)
Dept: OPTOMETRY | Facility: CLINIC | Age: 63
End: 2023-05-25
Attending: NURSE PRACTITIONER
Payer: COMMERCIAL

## 2023-05-25 DIAGNOSIS — H52.223 REGULAR ASTIGMATISM OF BOTH EYES: ICD-10-CM

## 2023-05-25 DIAGNOSIS — H52.4 PRESBYOPIA: ICD-10-CM

## 2023-05-25 DIAGNOSIS — Z01.00 VISIT FOR EYE AND VISION EXAM: Primary | ICD-10-CM

## 2023-05-25 DIAGNOSIS — H25.13 AGE-RELATED NUCLEAR CATARACT OF BOTH EYES: ICD-10-CM

## 2023-05-25 DIAGNOSIS — H52.03 HYPEROPIA, BILATERAL: ICD-10-CM

## 2023-05-25 PROCEDURE — 92015 DETERMINE REFRACTIVE STATE: CPT | Performed by: OPTOMETRIST

## 2023-05-25 PROCEDURE — 92004 COMPRE OPH EXAM NEW PT 1/>: CPT | Performed by: OPTOMETRIST

## 2023-05-25 ASSESSMENT — REFRACTION_MANIFEST
OD_AXIS: 174
OD_SPHERE: PLANO
OS_CYLINDER: +2.00
METHOD_AUTOREFRACTION: 1
OS_SPHERE: -0.75
OS_ADD: +2.25
OD_CYLINDER: +1.25
OS_AXIS: 005
OD_ADD: +2.25

## 2023-05-25 ASSESSMENT — CONF VISUAL FIELD
OD_NORMAL: 1
OD_SUPERIOR_NASAL_RESTRICTION: 0
OS_SUPERIOR_TEMPORAL_RESTRICTION: 0
OD_SUPERIOR_TEMPORAL_RESTRICTION: 0
OD_INFERIOR_NASAL_RESTRICTION: 0
OS_SUPERIOR_NASAL_RESTRICTION: 0
OD_INFERIOR_TEMPORAL_RESTRICTION: 0
OS_NORMAL: 1
OS_INFERIOR_NASAL_RESTRICTION: 0
OS_INFERIOR_TEMPORAL_RESTRICTION: 0

## 2023-05-25 ASSESSMENT — VISUAL ACUITY
OD_SC+: -2
METHOD: SNELLEN - LINEAR
OS_SC: 20/30-1
OS_SC: 20/40
OS_SC+: -1
OD_SC: 20/70-1
OD_SC: 20/25

## 2023-05-25 ASSESSMENT — REFRACTION_WEARINGRX
OD_AXIS: 178
OS_ADD: +2.25
OS_SPHERE: -0.75
SPECS_TYPE: OTC READERS DIDNT BRING
OS_CYLINDER: +1.25
OD_ADD: +2.25
OS_AXIS: 002
OD_CYLINDER: +0.75
OD_SPHERE: PLANO

## 2023-05-25 ASSESSMENT — EXTERNAL EXAM - LEFT EYE: OS_EXAM: NORMAL

## 2023-05-25 ASSESSMENT — EXTERNAL EXAM - RIGHT EYE: OD_EXAM: NORMAL

## 2023-05-25 ASSESSMENT — CUP TO DISC RATIO
OD_RATIO: 0.5
OS_RATIO: 0.5

## 2023-05-25 ASSESSMENT — KERATOMETRY
OD_AXISANGLE_DEGREES: 160
OS_K2POWER_DIOPTERS: 44.00
OD_AXISANGLE2_DEGREES: 070
OD_K1POWER_DIOPTERS: 43.00
OS_AXISANGLE2_DEGREES: 094
OD_K2POWER_DIOPTERS: 43.75
OS_AXISANGLE_DEGREES: 004
OS_K1POWER_DIOPTERS: 43.50

## 2023-05-25 ASSESSMENT — TONOMETRY
OD_IOP_MMHG: 18
IOP_METHOD: TONOPEN
OS_IOP_MMHG: 16

## 2023-05-25 ASSESSMENT — SLIT LAMP EXAM - LIDS
COMMENTS: DERMATOCHALASIS
COMMENTS: DERMATOCHALASIS

## 2023-05-25 NOTE — LETTER
5/25/2023         RE: Erik Youngblood  [address]  [facility] [address]        Dear Colleague,    Thank you for referring your patient, Erik Youngblood, to the [facility]. Please see a copy of my visit note below.    Chief Complaint   Patient presents with     Annual Eye Exam       Last Eye Exam: 4-  Dilated Previously: Yes    What are you currently using to see?   otc readers     Distance Vision Acuity: Satisfied with vision    Near Vision Acuity: Satisfied with vision while reading  with otc readers    Eye Comfort: good  Do you use eye drops? 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      History of BULB- Dr. Wood 11/23/2012    Medical, surgical and family histories reviewed and updated 5/25/2023.       OBJECTIVE: See Ophthalmology exam    ASSESSMENT:    ICD-10-CM    1. Visit for eye and vision exam  Z01.00 Adult Eye [facility] Referral     EYE EXAM (SIMPLE-NONBILLABLE)      2. Presbyopia  H52.4 REFRACTION      3. Hyperopia, bilateral  H52.03 REFRACTION      4. Regular astigmatism of both eyes  H52.223 REFRACTION      5. Age-related nuclear cataract of both eyes  H25.13 EYE EXAM (SIMPLE-NONBILLABLE)          PLAN:     Patient Instructions   Eyeglass prescription given.    You have the start of mild cataracts.  You may notice some blurred vision or glare with night driving.  It is important that you wear good sunglasses to protect your eyes from the ultraviolet light from the sun.  I recommend that you return in 1 year for an eye exam unless there are any sudden changes in your vision.       Return in 1 year for a complete eye exam or sooner if needed.    Adam Aguilera, JEFF           Again, thank you for allowing me to participate in the care of your patient.        Sincerely,        Adam Aguilera, OD

## 2023-05-25 NOTE — PROGRESS NOTES
Chief Complaint   Patient presents with     Annual Eye Exam       Last Eye Exam: 4-  Dilated Previously: Yes    What are you currently using to see?   otc readers     Distance Vision Acuity: Satisfied with vision    Near Vision Acuity: Satisfied with vision while reading  with otc readers    Eye Comfort: good  Do you use eye drops? 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      History of BULB- Dr. Wood 11/23/2012    Medical, surgical and family histories reviewed and updated 5/25/2023.       OBJECTIVE: See Ophthalmology exam    ASSESSMENT:    ICD-10-CM    1. Visit for eye and vision exam  Z01.00 Adult Eye  Referral     EYE EXAM (SIMPLE-NONBILLABLE)      2. Presbyopia  H52.4 REFRACTION      3. Hyperopia, bilateral  H52.03 REFRACTION      4. Regular astigmatism of both eyes  H52.223 REFRACTION      5. Age-related nuclear cataract of both eyes  H25.13 EYE EXAM (SIMPLE-NONBILLABLE)          PLAN:     Patient Instructions   Eyeglass prescription given.    You have the start of mild cataracts.  You may notice some blurred vision or glare with night driving.  It is important that you wear good sunglasses to protect your eyes from the ultraviolet light from the sun.  I recommend that you return in 1 year for an eye exam unless there are any sudden changes in your vision.       Return in 1 year for a complete eye exam or sooner if needed.    Adam Aguilera, OD

## 2023-05-25 NOTE — PATIENT INSTRUCTIONS
Eyeglass prescription given.    You have the start of mild cataracts.  You may notice some blurred vision or glare with night driving.  It is important that you wear good sunglasses to protect your eyes from the ultraviolet light from the sun.  I recommend that you return in 1 year for an eye exam unless there are any sudden changes in your vision.       Return in 1 year for a complete eye exam or sooner if needed.    Adam Aguilera, OD    The affects of the dilating drops last for 4- 6 hours.  You will be more sensitive to light and vision will be blurry up close.  Do not drive if you do not feel comfortable.  Mydriatic sunglasses were given if needed.      Optometry Providers       Clinic Locations                                 Telephone Number   Dr. Rosy Mejia [place]    [facility]  [place] [phone]     [place] [facility] Hours:                [place] Optical Hours:       [place] [facility] Hours:   [address]   [address] [facility]     [address] [place] [address]   [place] [address]    [place] [address]  Phone: [phone]                    Phone: [phone]     Phone: [phone]                      Monday 8:00-6:00                          Monday 8:00-6:00                          Monday 8:00-6:00              Tuesday 8:00-6:00                          Tuesday 8:00-6:00                          Tuesday 8:00-6:00              Wednesday 8:00-6:00                  Wednesday 8:00-6:00                   Wednesday 8:00-6:00      Thursday 8:00-6:00                        Thursday 8:00-6:00                         Thursday 8:00-6:00            Friday 8:00-5:00                              Friday 8:00-5:00                              Friday 8:00-5:00    [place] [facility] Hours:   [address] [place]  [address]  [phone]    Monday 9:00-6:00  Tuesday 9:00-6:00  Wednesday 9:00-6:00  Thursday 9:00-6:00  Friday 9:00-5:00  As always, Thank you for trusting us with your health care needs!

## 2023-09-28 ENCOUNTER — VIRTUAL VISIT (OUTPATIENT)
Dept: FAMILY MEDICINE | Facility: CLINIC | Age: 63
End: 2023-09-28
Payer: COMMERCIAL

## 2023-09-28 DIAGNOSIS — Z23 NEED FOR PROPHYLACTIC VACCINATION AND INOCULATION AGAINST INFLUENZA: ICD-10-CM

## 2023-09-28 DIAGNOSIS — C61 PROSTATE CANCER (H): Primary | ICD-10-CM

## 2023-09-28 DIAGNOSIS — I10 ESSENTIAL HYPERTENSION WITH GOAL BLOOD PRESSURE LESS THAN 140/90: ICD-10-CM

## 2023-09-28 DIAGNOSIS — N52.9 ERECTILE DYSFUNCTION, UNSPECIFIED ERECTILE DYSFUNCTION TYPE: ICD-10-CM

## 2023-09-28 DIAGNOSIS — Z23 NEED FOR COVID-19 VACCINE: ICD-10-CM

## 2023-09-28 PROCEDURE — 99214 OFFICE O/P EST MOD 30 MIN: CPT | Mod: VID | Performed by: NURSE PRACTITIONER

## 2023-09-28 RX ORDER — TADALAFIL 10 MG/1
TABLET ORAL
Qty: 6 TABLET | Refills: 3 | Status: SHIPPED | OUTPATIENT
Start: 2023-09-28 | End: 2024-05-14

## 2023-09-28 NOTE — PROGRESS NOTES
Lester is a 63 year old who is being evaluated via a billable video visit.      How would you like to obtain your AVS? Askerhart  If the video visit is dropped, the invitation should be resent by: Text to cell phone: [phone]  Will anyone else be joining your video visit? Yes: Wife How would they like to receive their invitation? Text to cell phone: [phone]          Assessment & Plan     Prostate cancer (H)  Followed by Urology, stable    Essential hypertension with goal blood pressure less than 140/90  Well controlled, continue current management    Erectile dysfunction, unspecified erectile dysfunction type  Restarting Cialis for prn use, Reviewed dosing, potential side effects. He will send me a [employer] message to let me know how it is working for him.    - tadalafil (CIALIS) 10 MG tablet  Dispense: 6 tablet; Refill: 3    Need for prophylactic vaccination and inoculation against influenza  Needs   PA needed to get vaccine per his insurance.    Need for COVID-19 vaccine  Needs PA to get vaccine per his insurance.  - COVID-19 mRNA vaccine 12+y (PFIZER) injection 30 mcg      Prescription drug management  15 minutes spent by me on the date of the encounter doing chart review, history and exam, documentation and further activities per the note       Work on weight loss  Regular exercise  See Patient Instructions    IRMA Joy [facility]   Lester is a 63 year old, presenting for the following health issues:  Erectile Dysfunction        9/28/2023     1:22 PM   Additional Questions   Roomed by Kirit MOSCOSO       History of Present Illness       Reason for visit:  ED medication    He eats 0-1 servings of fruits and vegetables daily.He consumes 1 sweetened beverage(s) daily.He exercises with enough effort to increase his heart rate 60 or more minutes per day.  He exercises with enough effort to increase his heart rate 7 days per week.   He is taking medications  regularly.     Hypertension- Home /80 when checked by  for life insurance. He continues on Amlodipine, hydrochlorothiazide without adverse side effects, no issues with medication compliance. He is exercising regularly.    ]ED- PMH significant for prostate cancer, s/p laparoscopic prostatectomy, lymphadenectomy 3/20/2015, now on surveillance every 5 years. He endorses decreased erection firmness and requests refill of Cialis which he has used in the past with good symptom relief.   He s not on nitrate, home BP is well controlled.       Review of Systems   Constitutional, HEENT, cardiovascular, pulmonary, gi and gu systems are negative, except as otherwise noted.      Objective           Vitals:  No vitals were obtained today due to virtual visit.    Physical Exam   GENERAL: Healthy, alert and no distress  RESP: No audible wheeze, cough, or visible cyanosis.     PSYCH: Mentation appears normal, affect normal/bright, judgement and insight intact, normal speech           Video-Visit Details    Type of service:  Video Visit     Originating Location (pt. Location): Home    Distant Location (provider location):  Off-site  Platform used for Video Visit: Other: unable to do virtual visit with audio so changed to phone visit instead.

## 2023-10-04 ENCOUNTER — MYC MEDICAL ADVICE (OUTPATIENT)
Dept: FAMILY MEDICINE | Facility: CLINIC | Age: 63
End: 2023-10-04
Payer: COMMERCIAL

## 2023-10-26 ENCOUNTER — MYC MEDICAL ADVICE (OUTPATIENT)
Dept: FAMILY MEDICINE | Facility: CLINIC | Age: 63
End: 2023-10-26
Payer: COMMERCIAL

## 2023-10-27 NOTE — TELEPHONE ENCOUNTER
Patient sent a copy of vaccinations for RSV and Flu shot via Skype. Review records and unable to read. Called and spoke with patient. Stated received vaccinations at [facility] target in [place].     Confirmed with [facility] pharmacy. Immunization record updated.    Billing Type: Third-Party Bill

## 2023-11-29 ENCOUNTER — OFFICE VISIT (OUTPATIENT)
Dept: FAMILY MEDICINE | Facility: CLINIC | Age: 63
End: 2023-11-29
Attending: NURSE PRACTITIONER
Payer: COMMERCIAL

## 2023-11-29 VITALS
OXYGEN SATURATION: 98 % | RESPIRATION RATE: 20 BRPM | SYSTOLIC BLOOD PRESSURE: 146 MMHG | TEMPERATURE: 97.4 F | HEART RATE: 74 BPM | HEIGHT: 71 IN | DIASTOLIC BLOOD PRESSURE: 78 MMHG | BODY MASS INDEX: 29.54 KG/M2 | WEIGHT: 211 LBS

## 2023-11-29 DIAGNOSIS — N28.1 RENAL CYST, ACQUIRED, RIGHT: ICD-10-CM

## 2023-11-29 DIAGNOSIS — N52.9 ERECTILE DYSFUNCTION, UNSPECIFIED ERECTILE DYSFUNCTION TYPE: ICD-10-CM

## 2023-11-29 DIAGNOSIS — I10 ESSENTIAL HYPERTENSION WITH GOAL BLOOD PRESSURE LESS THAN 140/90: ICD-10-CM

## 2023-11-29 DIAGNOSIS — C61 PROSTATE CANCER (H): Primary | ICD-10-CM

## 2023-11-29 DIAGNOSIS — R74.01 ELEVATED SGOT (AST): ICD-10-CM

## 2023-11-29 DIAGNOSIS — E78.5 HYPERLIPIDEMIA LDL GOAL <130: ICD-10-CM

## 2023-11-29 LAB
ALT SERPL W P-5'-P-CCNC: 41 U/L (ref 0–70)
ANION GAP SERPL CALCULATED.3IONS-SCNC: 11 MMOL/L (ref 7–15)
AST SERPL W P-5'-P-CCNC: 54 U/L (ref 0–45)
BUN SERPL-MCNC: 17 MG/DL (ref 8–23)
CALCIUM SERPL-MCNC: 10.1 MG/DL (ref 8.8–10.2)
CHLORIDE SERPL-SCNC: 99 MMOL/L (ref 98–107)
CHOLEST SERPL-MCNC: 118 MG/DL
CREAT SERPL-MCNC: 1.14 MG/DL (ref 0.67–1.17)
CREAT UR-MCNC: 97.9 MG/DL
DEPRECATED HCO3 PLAS-SCNC: 29 MMOL/L (ref 22–29)
EGFRCR SERPLBLD CKD-EPI 2021: 72 ML/MIN/1.73M2
GLUCOSE SERPL-MCNC: 95 MG/DL (ref 70–99)
HDLC SERPL-MCNC: 54 MG/DL
LDLC SERPL CALC-MCNC: 46 MG/DL
MICROALBUMIN UR-MCNC: <12 MG/L
MICROALBUMIN/CREAT UR: NORMAL MG/G{CREAT}
NONHDLC SERPL-MCNC: 64 MG/DL
POTASSIUM SERPL-SCNC: 3.9 MMOL/L (ref 3.4–5.3)
SODIUM SERPL-SCNC: 139 MMOL/L (ref 135–145)
TRIGL SERPL-MCNC: 88 MG/DL

## 2023-11-29 PROCEDURE — 36415 COLL VENOUS BLD VENIPUNCTURE: CPT | Performed by: NURSE PRACTITIONER

## 2023-11-29 PROCEDURE — 80048 BASIC METABOLIC PNL TOTAL CA: CPT | Performed by: NURSE PRACTITIONER

## 2023-11-29 PROCEDURE — 84460 ALANINE AMINO (ALT) (SGPT): CPT | Performed by: NURSE PRACTITIONER

## 2023-11-29 PROCEDURE — 82043 UR ALBUMIN QUANTITATIVE: CPT | Performed by: NURSE PRACTITIONER

## 2023-11-29 PROCEDURE — 99214 OFFICE O/P EST MOD 30 MIN: CPT | Performed by: NURSE PRACTITIONER

## 2023-11-29 PROCEDURE — 80061 LIPID PANEL: CPT | Performed by: NURSE PRACTITIONER

## 2023-11-29 PROCEDURE — 82570 ASSAY OF URINE CREATININE: CPT | Performed by: NURSE PRACTITIONER

## 2023-11-29 PROCEDURE — 84450 TRANSFERASE (AST) (SGOT): CPT | Performed by: NURSE PRACTITIONER

## 2023-11-29 RX ORDER — TADALAFIL 10 MG/1
TABLET ORAL
Qty: 6 TABLET | Refills: 3 | Status: CANCELLED | OUTPATIENT
Start: 2023-11-29

## 2023-11-29 RX ORDER — TADALAFIL 20 MG/1
20 TABLET ORAL EVERY 24 HOURS
Qty: 18 TABLET | Refills: 3 | Status: SHIPPED | OUTPATIENT
Start: 2023-11-29

## 2023-11-29 RX ORDER — ROSUVASTATIN CALCIUM 20 MG/1
20 TABLET, COATED ORAL DAILY
Qty: 90 TABLET | Refills: 3 | Status: SHIPPED | OUTPATIENT
Start: 2023-11-29

## 2023-11-29 ASSESSMENT — PAIN SCALES - GENERAL: PAINLEVEL: NO PAIN (0)

## 2023-11-29 NOTE — PROGRESS NOTES
"  Assessment & Plan     Prostate cancer (H)  Stable, followed annually by Urology    Erectile dysfunction, unspecified erectile dysfunction type  Increase Cialis to 20 mg tabs, call for any side effects/concerns,   - tadalafil (ADCIRCA/CIALIS) 20 MG tablet  Dispense: 18 tablet; Refill: 3    Essential hypertension with goal blood pressure less than 140/90  BP elevated in clinic today but he came rushing in- home BP's have been in the normal range, no change in BP medication today. Continue with regular exercise, low salt diet, continue to work on weigh tloss  - Basic metabolic panel  (Ca, Cl, CO2, Creat, Gluc, K, Na, BUN)  - Albumin Random Urine Quantitative with Creat Ratio  - Basic metabolic panel  (Ca, Cl, CO2, Creat, Gluc, K, Na, BUN)  - Albumin Random Urine Quantitative with Creat Ratio    Hyperlipidemia LDL goal <130  Due for lipid panel, refilled Crestor, low chol diet advised  - rosuvastatin (CRESTOR) 20 MG tablet  Dispense: 90 tablet; Refill: 3  - Lipid panel reflex to direct LDL Non-fasting  - AST  - ALT  - Lipid panel reflex to direct LDL Non-fasting  - AST  - ALT      Ordering of each unique test  Prescription drug management  26 minutes spent by me on the date of the encounter doing chart review, history and exam, documentation and further activities per the note       BMI:   Estimated body mass index is 29.43 kg/m  as calculated from the following:    Height as of this encounter: 1.803 m (5' 11\").    Weight as of this encounter: 95.7 kg (211 lb).   Weight management plan: Discussed healthy diet and exercise guidelines    Work on weight loss  Regular exercise  See Patient Instructions    IRMA Joy CNP  [facility]    Lupe Batista is a 63 year old, presenting for the following health issues:  Hypertension and Lipids      11/29/2023    10:44 AM   Additional Questions   Roomed by james   Accompanied by self       History of Present Illness       Reason for " "visit:  Erectile dysfunction    He eats 0-1 servings of fruits and vegetables daily.He consumes 1 sweetened beverage(s) daily.He exercises with enough effort to increase his heart rate 60 or more minutes per day.  He exercises with enough effort to increase his heart rate 6 days per week.   He is taking medications regularly.         Would like a refill on his medication   Hyperlipidemia Follow-Up    Are you regularly taking any medication or supplement to lower your cholesterol?   Yes- Crestor 20 mg  Are you having muscle aches or other side effects that you think could be caused by your cholesterol lowering medication?  No    Hypertension Follow-up    Do you check your blood pressure regularly outside of the clinic? Yes   Are you following a low salt diet? Yes  Are your blood pressures ever more than 140 on the top number (systolic) OR more   than 90 on the bottom number (diastolic), for example 140/90? No    Erectile dysfunction- Cialis is working but he feels he could benefit from a slightly higher dose, no adverse effects. PMH significant for prostate cancer s/p laparoscopic prostatectomy, lymphadenectomy 2015, stable.      Review of Systems   Constitutional, HEENT, cardiovascular, pulmonary, gi and gu systems are negative, except as otherwise noted.      Objective    BP (!) 155/82 (BP Location: Left arm, Patient Position: Chair, Cuff Size: Adult Regular)   Pulse 74   Temp 97.4  F (36.3  C) (Tympanic)   Resp 20   Ht 1.803 m (5' 11\")   Wt 95.7 kg (211 lb)   SpO2 98%   BMI 29.43 kg/m    Body mass index is 29.43 kg/m .  Physical Exam   GENERAL: healthy, alert and no distress  NECK: no adenopathy, no asymmetry, masses, or scars and thyroid normal to palpation  RESP: lungs clear to auscultation - no rales, rhonchi or wheezes  CV: regular rate and rhythm, normal S1 S2, no S3 or S4, no murmur, click or rub, no peripheral edema and peripheral pulses strong  ABDOMEN: soft, nontender, no hepatosplenomegaly, no " masses and bowel sounds normal  MS: no gross musculoskeletal defects noted, no edema    No results found for this or any previous visit (from the past 24 hour(s)).

## 2023-11-29 NOTE — PATIENT INSTRUCTIONS
At [facility], we strive to deliver an exceptional experience to you, every time we see you. If you receive a survey, please complete it as we do value your feedback.  If you have MyChart, you can expect to receive results automatically within 24 hours of their completion.  Your provider will send a note interpreting your results as well.   If you do not have MyChart, you should receive your results in about a week by mail.    Your care team:                            Family Medicine Internal Medicine   MD Waylon Reyes MD Shantel Branch-Fleming, MD Srinivasa Vaka, MD Katya Belousova, PACECI Andino, MD Pediatrics   Austin Stephens, PAMD Shante Carballo MD Amelia Massimini APRN CNP   IRMA Faustin CNP, MD Charanya Pasupathi, MD Kathleen Widmer, NP coming October 2023 Same-Day (No follow up visit)    COLIN Barahona PA coming Oct 2023     Clinic hours: Monday - Thursday 7 am-6 pm; Fridays 7 am-5 pm.   Urgent care: Monday - Friday 10 am- 8 pm; Saturday and Sunday 9 am-5 pm.    Clinic: [phone]       [facility]: Monday - Thursday 8 am - 7 pm; Friday 8 am - 6 pm  [facility]: [phone]

## 2023-12-06 ENCOUNTER — ANCILLARY PROCEDURE (OUTPATIENT)
Dept: ULTRASOUND IMAGING | Facility: CLINIC | Age: 63
End: 2023-12-06
Attending: NURSE PRACTITIONER
Payer: COMMERCIAL

## 2023-12-06 DIAGNOSIS — R74.01 ELEVATED SGOT (AST): ICD-10-CM

## 2023-12-06 PROCEDURE — 76705 ECHO EXAM OF ABDOMEN: CPT

## 2023-12-12 ENCOUNTER — HOSPITAL ENCOUNTER (OUTPATIENT)
Dept: MRI IMAGING | Facility: CLINIC | Age: 63
Discharge: HOME | End: 2023-12-12
Attending: NURSE PRACTITIONER | Admitting: NURSE PRACTITIONER
Payer: COMMERCIAL

## 2023-12-12 DIAGNOSIS — N28.1 RENAL CYST, ACQUIRED, RIGHT: ICD-10-CM

## 2023-12-12 PROCEDURE — 255N000002 HC RX 255 OP 636: Performed by: NURSE PRACTITIONER

## 2023-12-12 PROCEDURE — 74183 MRI ABD W/O CNTR FLWD CNTR: CPT

## 2023-12-12 PROCEDURE — A9585 GADOBUTROL INJECTION: HCPCS | Performed by: NURSE PRACTITIONER

## 2023-12-12 RX ORDER — GADOBUTROL 604.72 MG/ML
9 INJECTION INTRAVENOUS ONCE
Status: COMPLETED | OUTPATIENT
Start: 2023-12-12 | End: 2023-12-12

## 2023-12-12 RX ADMIN — GADOBUTROL 9 ML: 604.72 INJECTION INTRAVENOUS at 20:39

## 2023-12-20 ENCOUNTER — PATIENT OUTREACH (OUTPATIENT)
Dept: GASTROENTEROLOGY | Facility: CLINIC | Age: 63
End: 2023-12-20

## 2024-02-15 ENCOUNTER — OFFICE VISIT (OUTPATIENT)
Dept: UROLOGY | Facility: CLINIC | Age: 64
End: 2024-02-15
Payer: COMMERCIAL

## 2024-02-15 DIAGNOSIS — C61 PROSTATE CANCER (H): Primary | ICD-10-CM

## 2024-02-15 DIAGNOSIS — N28.1 RENAL CYST, RIGHT: ICD-10-CM

## 2024-02-15 LAB — PSA SERPL DL<=0.01 NG/ML-MCNC: 0.02 NG/ML (ref 0–4.5)

## 2024-02-15 PROCEDURE — 36415 COLL VENOUS BLD VENIPUNCTURE: CPT | Performed by: UROLOGY

## 2024-02-15 PROCEDURE — 84153 ASSAY OF PSA TOTAL: CPT | Performed by: UROLOGY

## 2024-02-15 PROCEDURE — 99204 OFFICE O/P NEW MOD 45 MIN: CPT | Performed by: UROLOGY

## 2024-02-15 NOTE — NURSING NOTE
Erik Youngblood's goals for this visit include:   Chief Complaint   Patient presents with    New Patient     . Right Kidney Cyst       He requests these members of his care team be copied on today's visit information:       PCP: Fauzia Yepez    Referring Provider:  Referred Self, MD  No address on file    There were no vitals taken for this visit.    Do you need any medication refills at today's visit?     Angie Juárez LPN on 2/15/2024 at 1:09 PM

## 2024-02-15 NOTE — PROGRESS NOTES
Urology Consult History and Physical  NIGHAT LAI  Name: Erik Youngblood    MRN: [id number]   YOB: 1960       We were asked to see Erik Youngblood at the request of Dr. Yepez for evaluation and treatment of RIGHT renal cyst.        Chief Complaint:   RIGHT renal cyst    History is obtained from the patient            History of Present Illness:   Erik Youngblood is a 63 year old male who is being seen for evaluation of RIGHT renal cyst, hsitory of prostate cancer     History of prostate cancer with RALP/BPLND in 2015 with final pathology showing Jeni 3+4 = 7 prostate cancer  He has had an undetectable or very low PSA since that time would last PSA in 2021 of 0.02    Found recently to have incidental right renal cyst and referred to discuss this and reviewed these images  He denies any flank pain             Past Medical History:     Past Medical History:   Diagnosis Date    Adenomatous colon polyp     Allergic conjunctivitis     Cancer (H) 12/22/14    Prostate Cancer / Removed    Complication of anesthesia     nausea    Hypercholesterolemia     Hypertension     Obesity, Class I, BMI 30-34.9 5/19/2016    Spinal stenosis     Trigger finger, right middle finger             Past Surgical History:     Past Surgical History:   Procedure Laterality Date    APPENDECTOMY      COLONOSCOPY  8/9/2016    All was good    COLONOSCOPY N/A 11/22/2021    Procedure: COLONOSCOPY, FLEXIBLE, WITH LESION REMOVAL USING SNARE;  Surgeon: Chacho Holguin DO;  Location: [facility] OR    COLONOSCOPY WITH CO2 INSUFFLATION N/A 8/9/2016    Procedure: COLONOSCOPY WITH CO2 INSUFFLATION;  Surgeon: Evan Brown MD;  Location: [facility] OR    COLONOSCOPY WITH CO2 INSUFFLATION N/A 11/22/2021    Procedure: COLONOSCOPY, WITH CO2 INSUFFLATION;  Surgeon: Chacho Holguin DO;  Location: [facility] OR    LAPAROSCOPIC PROSTATECTOMY, LYMPHADENECTOMY, COMBINED  3/20/15    robot assisted, Dr. Anton Alonso    RELEASE TRIGGER FINGER Right     Right 3rd  trigger finger release.     RELEASE TRIGGER FINGER Right 9/15/2021    Procedure: RELEASE, RIGHT MIDDLE TRIGGER FINGER;  Surgeon: Sebastian Payan MD;  Location: [facility] OR    REPAIR PTOSIS      REPAIR PTOSIS BILATERAL  11/23/2012    Procedure: REPAIR PTOSIS BILATERAL;  BILATERAL UPPER LID MECHANICAL PTOSIS REPAIR AND BILATERAL BROWN PTOSIS;  Surgeon: Lowell Wood MD;  Location: [facility]    REPAIR PTOSIS BROW BILATERAL  11/23/2012    Procedure: REPAIR PTOSIS BROW BILATERAL;;  Surgeon: Lowell Wood MD;  Location: [facility]    VASECTOMY              Social History:     Social History     Tobacco Use    Smoking status: Never     Passive exposure: Never    Smokeless tobacco: Never   Substance Use Topics    Alcohol use: Yes     Alcohol/week: 0.0 standard drinks of alcohol     Comment: rare       History   Smoking Status    Never   Smokeless Tobacco    Never            Family History:     Family History   Problem Relation Age of Onset    Hypertension Mother     Diabetes Mother     Hypertension Father     Prostate Cancer Father     Cancer Father     Hypertension Maternal Grandmother     Hypertension Maternal Grandfather     Hypertension Paternal Grandmother     Hypertension Paternal Grandfather     Hypertension Brother     Prostate Cancer Brother     Cancer Brother     Prostate Cancer Brother     Cerebrovascular Disease No family hx of     Thyroid Disease No family hx of     Glaucoma No family hx of     Macular Degeneration No family hx of               Allergies:     Allergies   Allergen Reactions    Bee Venom Shortness Of Breath and Swelling    Lisinopril Hives, Swelling and Other (See Comments)     Angioedema?    Penicillins Hives and Swelling            Medications:     Current Outpatient Medications   Medication Sig    amLODIPine (NORVASC) 10 MG tablet Take 1 tablet (10 mg) by mouth daily    EPINEPHrine (ANY BX GENERIC EQUIV) 0.3 MG/0.3ML injection 2-pack Inject 0.3 mLs (0.3 mg) into the muscle as needed for  anaphylaxis May repeat one time in 5-15 minutes if response to initial dose is inadequate.    hydrochlorothiazide (HYDRODIURIL) 25 MG tablet TAKE ONE TABLET BY MOUTH ONCE DAILY    methylcellulose (CITRUCEL) powder Take by mouth daily    Multiple Vitamins-Minerals (CENTRUM SILVER 50+MEN PO)     rosuvastatin (CRESTOR) 20 MG tablet Take 1 tablet (20 mg) by mouth daily    tadalafil (ADCIRCA/CIALIS) 20 MG tablet Take 1 tablet (20 mg) by mouth every 24 hours    tadalafil (CIALIS) 10 MG tablet Take one tab po 30 min prior to intercourse. Do not take with nitrates, no not take more than 1 tab/24 hours.     No current facility-administered medications for this visit.             Review of Systems:     Reviewed and negative except for as noted above          Physical Exam:   No data found.  There is no height or weight on file to calculate BMI.     General: age-appropriate appearing male in NAD  HEENT: Head AT/NC, EOMI, CN Grossly intact  Lungs: no respiratory distress, or pursed lip breathing  Heart: No obvious jugular venous distension present  Neuro: Alert, oriented, speech and mentation normal;  moving all 4 extremities equally.  Psych: affect and mood normal          Data:   All laboratory data reviewed:    PSA:  9/24/2014 = 4.36   PSA   Latest Ref Rng 0.00 - 4.00 ug/L   12/19/2019 <0.01    10/13/2021 0.02        Lab Results   Component Value Date    CR 1.14 11/29/2023    CR 1.06 01/13/2021      PATHOLOGY:  3/20/2015:  FINAL DIAGNOSIS   A.     Prostate, prostatectomy  Adenocarcinoma, Jeni   score 3 + 4 = 7 (of 10).  See synoptic summary.   B.     Lymph node, right pelvic, biopsy  Connective tissue,   negative for carcinoma.  No lymph node identified.   C.     Lymph node, left pelvic, biopsy  One lymph node, negative   for carcinoma.     IMAGING:  All pertinent imaging reviewed:    All imaging studies reviewed by me.  I personally reviewed these imaging films.  A formal report from radiology will follow.    MRI  PROSTATE 12/12/2023:  FINDINGS: Right inferior pole cyst with a few thin septations and/or  exophytic daughter cyst measuring 5.4 cm in maximal dimension. No  definite nodular enhancing component or other aggressive feature  demonstrated. Solid renal lesions are evident. No hydronephrosis.  Adrenal glands unremarkable. No renal cysts evident. No hepatic  steatosis. Remaining visualized abdomen unremarkable.                                                                      IMPRESSION: Mildly complex cyst in the right kidney. This can be  followed by ultrasound in six months to evaluate for stability.         Impression and Plan:   Impression:   63-year-old man with history of [place] 3+4 = 7 prostate cancer status post a prostatectomy in 2015 with an incidental right renal cyst      Plan:   Right renal cyst  - I reviewed his MRI and reviewed these images personally.  I shared the images with the patient and his wife.  This appears to be a simple and benign cyst with septation and some complexity  - Overall this does not appear to be concerning  - Will plan for repeat imaging to confirm stability in 6 to 12 months an order for CT renal mass protocol placed  - I reviewed his labs with a normal serum creatinine    Prostate cancer  - I reviewed his pathology from surgery  - I reviewed his PSA history and trend and he is due for an updated PSA  - Order placed for the PSA which resulted after appointment and I sent him the results on [facility].  This did show a detectable PSA, but stable at 0.02 which is nonconcerning  - Follow-up in 1 year for surveillance PSA     Thank you for the kind consultation.    Time spent: 20 minutes spent on the date of the encounter doing chart review, history and exam, documentation and further activities as noted above.    Amaury Garcia MD   Urology  [facility]  [facility] Phone: [phone]  [facility] Phone: [phone]

## 2024-02-28 ENCOUNTER — TELEPHONE (OUTPATIENT)
Dept: UROLOGY | Facility: CLINIC | Age: 64
End: 2024-02-28
Payer: COMMERCIAL

## 2024-02-28 NOTE — TELEPHONE ENCOUNTER
Imaging in PACS.    Paulina blanton [facility] Assistant- Surgical Specialties

## 2024-02-28 NOTE — TELEPHONE ENCOUNTER
Requested 12/23/14 imaging to be pushed from [facility] to  PACS.Imaging request form brought downstairs.    Paulina blanton [facility] Assistant- Surgical Specialties

## 2024-04-02 ENCOUNTER — PATIENT OUTREACH (OUTPATIENT)
Dept: CARE COORDINATION | Facility: CLINIC | Age: 64
End: 2024-04-02
Payer: COMMERCIAL

## 2024-04-29 ENCOUNTER — TELEPHONE (OUTPATIENT)
Dept: FAMILY MEDICINE | Facility: CLINIC | Age: 64
End: 2024-04-29
Payer: COMMERCIAL

## 2024-04-29 NOTE — TELEPHONE ENCOUNTER
Patient Quality Outreach    Patient is due for the following:   Diabetes -  Eye Exam  Physical Annual Wellness Visit      Topic Date Due    COVID-19 Vaccine (6 - 2023-24 season) 02/02/2024    Pneumococcal Vaccine (1 of 1 - PCV) 04/08/2024       Next Steps:   Patient has upcoming appointment, these items will be addressed at that time.    Type of outreach:    Sent [employer] message.      Questions for provider review:    None           Elida Adair

## 2024-05-13 SDOH — HEALTH STABILITY: PHYSICAL HEALTH: ON AVERAGE, HOW MANY MINUTES DO YOU ENGAGE IN EXERCISE AT THIS LEVEL?: 60 MIN

## 2024-05-13 SDOH — HEALTH STABILITY: PHYSICAL HEALTH: ON AVERAGE, HOW MANY DAYS PER WEEK DO YOU ENGAGE IN MODERATE TO STRENUOUS EXERCISE (LIKE A BRISK WALK)?: 6 DAYS

## 2024-05-13 ASSESSMENT — SOCIAL DETERMINANTS OF HEALTH (SDOH): HOW OFTEN DO YOU GET TOGETHER WITH FRIENDS OR RELATIVES?: ONCE A WEEK

## 2024-05-14 ENCOUNTER — OFFICE VISIT (OUTPATIENT)
Dept: FAMILY MEDICINE | Facility: CLINIC | Age: 64
End: 2024-05-14
Payer: COMMERCIAL

## 2024-05-14 VITALS
HEIGHT: 71 IN | OXYGEN SATURATION: 97 % | TEMPERATURE: 97 F | HEART RATE: 71 BPM | BODY MASS INDEX: 29.26 KG/M2 | DIASTOLIC BLOOD PRESSURE: 79 MMHG | SYSTOLIC BLOOD PRESSURE: 150 MMHG | WEIGHT: 209 LBS | RESPIRATION RATE: 18 BRPM

## 2024-05-14 DIAGNOSIS — Z00.00 MEDICARE ANNUAL WELLNESS VISIT, INITIAL: Primary | ICD-10-CM

## 2024-05-14 DIAGNOSIS — C61 PROSTATE CANCER (H): ICD-10-CM

## 2024-05-14 DIAGNOSIS — I10 ESSENTIAL HYPERTENSION WITH GOAL BLOOD PRESSURE LESS THAN 140/90: ICD-10-CM

## 2024-05-14 DIAGNOSIS — N52.9 ERECTILE DYSFUNCTION, UNSPECIFIED ERECTILE DYSFUNCTION TYPE: ICD-10-CM

## 2024-05-14 DIAGNOSIS — Z23 NEED FOR PNEUMOCOCCAL 20-VALENT CONJUGATE VACCINATION: ICD-10-CM

## 2024-05-14 DIAGNOSIS — Z01.00 VISIT FOR EYE AND VISION EXAM: ICD-10-CM

## 2024-05-14 DIAGNOSIS — E78.5 HYPERLIPIDEMIA LDL GOAL <130: ICD-10-CM

## 2024-05-14 PROCEDURE — 99214 OFFICE O/P EST MOD 30 MIN: CPT | Mod: 25 | Performed by: NURSE PRACTITIONER

## 2024-05-14 PROCEDURE — G0009 ADMIN PNEUMOCOCCAL VACCINE: HCPCS | Performed by: NURSE PRACTITIONER

## 2024-05-14 PROCEDURE — G0402 INITIAL PREVENTIVE EXAM: HCPCS | Performed by: NURSE PRACTITIONER

## 2024-05-14 PROCEDURE — 90677 PCV20 VACCINE IM: CPT | Performed by: NURSE PRACTITIONER

## 2024-05-14 RX ORDER — HYDROCHLOROTHIAZIDE 25 MG/1
TABLET ORAL
Qty: 90 TABLET | Refills: 3 | Status: SHIPPED | OUTPATIENT
Start: 2024-05-14

## 2024-05-14 RX ORDER — AMLODIPINE BESYLATE 10 MG/1
10 TABLET ORAL DAILY
Qty: 90 TABLET | Refills: 3 | Status: SHIPPED | OUTPATIENT
Start: 2024-05-14

## 2024-05-14 ASSESSMENT — PAIN SCALES - GENERAL: PAINLEVEL: NO PAIN (0)

## 2024-05-14 NOTE — PATIENT INSTRUCTIONS
At [facility], we strive to deliver an exceptional experience to you, every time we see you. If you receive a survey, please complete it as we do value your feedback.  If you have MyChart, you can expect to receive results automatically within 24 hours of their completion.  Your provider will send a note interpreting your results as well.   If you do not have MyChart, you should receive your results in about a week by mail.    Your care team:                            Family Medicine Internal Medicine   MD Waylon Reyes, MD Bela Pastor, MD Sade Foster, PA-C    Dimitrios Andino, MD Pediatrics   Austin Stephens, PA-C  Claudia Alegre, MD Mirela Wild APRIRMA Ac CNP, CNP, MD Kavya Silva, MD Lillie Elizabeth, CNP  Same-Day (No follow up visit)   Erich Saul, NGUYEN Butterfield, PA-C    Magalis Lopez PA-C     Clinic hours: Monday - Thursday 7 am-6 pm; Fridays 7 am-5 pm.   Urgent care: Monday - Friday 10 am- 8 pm; Saturday and Sunday 9 am-5 pm.    Clinic: [phone]       [facility]: Monday - Thursday 8 am - 7 pm; Friday 8 am - 6 pm  [facility]: [phone]

## 2024-05-14 NOTE — NURSING NOTE
Prior to immunization administration, verified patients identity using patient s name and date of birth. Please see Immunization Activity for additional information.     Screening Questionnaire for Adult Immunization    Are you sick today?   No   Do you have allergies to medications, food, a vaccine component or latex?   Yes   Have you ever had a serious reaction after receiving a vaccination?   No   Do you have a long-term health problem with heart, lung, kidney, or metabolic disease (e.g., diabetes), asthma, a blood disorder, no spleen, complement component deficiency, a cochlear implant, or a spinal fluid leak?  Are you on long-term aspirin therapy?   No   Do you have cancer, leukemia, HIV/AIDS, or any other immune system problem?   No   Do you have a parent, brother, or sister with an immune system problem?   No   In the past 3 months, have you taken medications that affect  your immune system, such as prednisone, other steroids, or anticancer drugs; drugs for the treatment of rheumatoid arthritis, Crohn s disease, or psoriasis; or have you had radiation treatments?   No   Have you had a seizure, or a brain or other nervous system problem?   No   During the past year, have you received a transfusion of blood or blood    products, or been given immune (gamma) globulin or antiviral drug?   No   For women: Are you pregnant or is there a chance you could become       pregnant during the next month?   No   Have you received any vaccinations in the past 4 weeks?   No     Immunization questionnaire was positive for at least one answer.  Notified Yolanda Yepez .      Patient instructed to remain in clinic for 15 minutes afterwards, and to report any adverse reactions.     Screening performed by Marva Corrigan MA on 5/14/2024 at 9:07 AM.

## 2024-05-14 NOTE — PROGRESS NOTES
Preventive Care Visit  [facility]  IRMA Joy CNP, Family Medicine  May 14, 2024      Assessment & Plan     Medicare annual wellness visit, initial  Age appropriate preventive screenings,health maintenance issues discussed.    - REVIEW OF HEALTH MAINTENANCE PROTOCOL ORDERS    Prostate cancer (H)  Followed by Urology    Essential hypertension with goal blood pressure less than 140/90  BP elevated today  but home readings are in the normal range.  He reports white coat HTN  - hydrochlorothiazide (HYDRODIURIL) 25 MG tablet  Dispense: 90 tablet; Refill: 3  - amLODIPine (NORVASC) 10 MG tablet  Dispense: 90 tablet; Refill: 3    Erectile dysfunction, unspecified erectile dysfunction type  Followed by Urology, on cialis with good result    Hyperlipidemia LDL goal <130  Sable, continue Crestor    Visit for eye and vision exam    - Adult Eye  Referral    Need for pneumococcal 20-valent conjugate vaccination    - Pneumococcal 20 Valent Conjugate (PCV20)      Patient has been advised of split billing requirements and indicates understanding: Yes  Ordering of each unique test  Prescription drug management        Counseling  Appropriate preventive services were discussed with this patient, including applicable screening as appropriate for fall prevention, nutrition, physical activity, Tobacco-use cessation, weight loss and cognition.  Checklist reviewing preventive services available has been given to the patient.  Reviewed patient's diet, addressing concerns and/or questions.       Regular exercise  See Patient Instructions    Subjective   Lester is a 64 year old, presenting for the following:  Physical        5/14/2024     8:24 AM   Additional Questions   Roomed by james   Accompanied by self        Health Care Directive  Patient does not have a Health Care Directive or Living Will: Patient states has Advance Directive and will bring in a copy to clinic.    HPI        Hyperlipidemia Follow-Up    Are you regularly taking any medication or supplement to lower your cholesterol?   Yes- Crestor 20 mg daily  Are you having muscle aches or other side effects that you think could be caused by your cholesterol lowering medication?  No    Hypertension Follow-up    Do you check your blood pressure regularly outside of the clinic? Yes   Are you following a low salt diet? Yes  Are your blood pressures ever more than 140 on the top number (systolic) OR more   than 90 on the bottom number (diastolic), for example 140/90? No  Home BP's 120-130/70's.    History prostate cancer, s/p prostatectomy and now followed annually by Urology, doing well, uses Cialis prn with good effect.          5/13/2024   General Health   How would you rate your overall physical health? Excellent   Feel stress (tense, anxious, or unable to sleep) Not at all         5/13/2024   Nutrition   Diet: Regular (no restrictions)         5/13/2024   Exercise   Days per week of moderate/strenous exercise 6 days   Average minutes spent exercising at this level 60 min         5/13/2024   Social Factors   Frequency of gathering with friends or relatives Once a week   Worry food won't last until get money to buy more No   Food not last or not have enough money for food? No   Do you have housing?  Yes   Are you worried about losing your housing? No   Lack of transportation? No   Unable to get utilities (heat,electricity)? No         5/14/2024   Fall Risk   Fallen 2 or more times in the past year? No   Trouble with walking or balance? No          5/13/2024   Activities of Daily Living- Home Safety   Needs help with the following daily activites None of the above   Safety concerns in the home None of the above         5/13/2024   Dental   Dentist two times every year? Yes         5/13/2024   Hearing Screening   Hearing concerns? None of the above         5/13/2024   Driving Risk Screening   Patient/family members have concerns about  driving No         5/13/2024   General Alertness/Fatigue Screening   Have you been more tired than usual lately? No         5/13/2024   Urinary Incontinence Screening   Bothered by leaking urine in past 6 months No         5/13/2024   TB Screening   Were you born outside of the [place]? No         Today's PHQ-2 Score:       5/13/2024     2:40 PM   PHQ-2 ( 1999 Pf[employer])   Q1: Little interest or pleasure in doing things 0   Q2: Feeling down, depressed or hopeless 0   PHQ-2 Score 0   Q1: Little interest or pleasure in doing things Not at all   Q2: Feeling down, depressed or hopeless Not at all   PHQ-2 Score 0           5/13/2024   Substance Use   Alcohol more than 3/day or more than 7/wk No   Do you have a current opioid prescription? No   How severe/bad is pain from 1 to 10? 0/10 (No Pain)   Do you use any other substances recreationally? No     Social History     Tobacco Use    Smoking status: Never     Passive exposure: Never    Smokeless tobacco: Never   Vaping Use    Vaping status: Never Used   Substance Use Topics    Alcohol use: Not Currently     Comment: rare    Drug use: No           5/13/2024   AAA Screening   Family history of Abdominal Aortic Aneurysm (AAA)? No   Last PSA:   PSA   Date Value Ref Range Status   12/19/2019 <0.01 0 - 4 ug/L Final     Comment:     Assay Method:  Chemiluminescence using Siemens Vista analyzer     Prostate Specific Antigen Screen   Date Value Ref Range Status   10/13/2021 0.02 0.00 - 4.00 ug/L Final     PSA Tumor Marker   Date Value Ref Range Status   02/15/2024 0.02 0.00 - 4.50 ng/mL Final     ASCVD Risk   The ASCVD Risk score (Darien REGALADO, et al., 2019) failed to calculate for the following reasons:    The valid total cholesterol range is 130 to 320 mg/dL          Reviewed and updated as needed this visit by Provider                    Past Medical History:   Diagnosis Date    Adenomatous colon polyp     Allergic conjunctivitis     Cancer (H) 12/22/2014    Prostate Cancer /  Removed    Complication of anesthesia     nausea    Hypercholesterolemia     Hypertension     Obesity, Class I, BMI 30-34.9 05/19/2016    Spinal stenosis     Trigger finger, right middle finger      Past Surgical History:   Procedure Laterality Date    APPENDECTOMY      COLONOSCOPY  8/9/2016    All was good    COLONOSCOPY N/A 11/22/2021    Procedure: COLONOSCOPY, FLEXIBLE, WITH LESION REMOVAL USING SNARE;  Surgeon: Chacho Holguin DO;  Location: [facility]    COLONOSCOPY WITH CO2 INSUFFLATION N/A 8/9/2016    Procedure: COLONOSCOPY WITH CO2 INSUFFLATION;  Surgeon: Evan Brown MD;  Location: [facility]    COLONOSCOPY WITH CO2 INSUFFLATION N/A 11/22/2021    Procedure: COLONOSCOPY, WITH CO2 INSUFFLATION;  Surgeon: Chacho Holguin DO;  Location: [facility] OR    LAPAROSCOPIC PROSTATECTOMY, LYMPHADENECTOMY, COMBINED  3/20/15    robot assisted, Dr. Anton Alonso    RELEASE TRIGGER FINGER Right     Right 3rd trigger finger release.     RELEASE TRIGGER FINGER Right 9/15/2021    Procedure: RELEASE, RIGHT MIDDLE TRIGGER FINGER;  Surgeon: Sebastian Payan MD;  Location: [facility] OR    REPAIR PTOSIS      REPAIR PTOSIS BILATERAL  11/23/2012    Procedure: REPAIR PTOSIS BILATERAL;  BILATERAL UPPER LID MECHANICAL PTOSIS REPAIR AND BILATERAL BROWN PTOSIS;  Surgeon: Lowell Wood MD;  Location:  [facility]    REPAIR PTOSIS BROW BILATERAL  11/23/2012    Procedure: REPAIR PTOSIS BROW BILATERAL;;  Surgeon: Lowell Wood MD;  Location:  [facility]    VASECTOMY       Current providers sharing in care for this patient include:  Patient Care Team:  Fauzia Yepez APRN CNP as PCP - General (Nurse Practitioner - Family)  Ramona Aguilar [facility] as Pharmacist (Pharmacist)  Brittaney Pickett RP as Pharmacist (Pharmacist)  Mike Mcmillan MD as MD (Dermatology)  Adam Aguilera OD (Optometry)  Fauzia Yepez APRN CNP as Referring Physician (Family Medicine)  Fauzia Yepez APRN CNP as Assigned PCP  Amaury Garcia MD as  "MD (Urology)  Amaury Garcia MD as Assigned Surgical Provider    The following health maintenance items are reviewed in Epic and correct as of today:  Health Maintenance   Topic Date Due    COVID-19 Vaccine (6 - 2023-24 season) 02/02/2024    ANNUAL REVIEW OF HM ORDERS  05/02/2024    Pneumococcal Vaccine: 65+ Years (1 of 1 - PCV) 04/08/2024    MEDICARE ANNUAL WELLNESS VISIT  05/02/2024    EYE EXAM  05/25/2024    LIPID  11/29/2024    FALL RISK ASSESSMENT  05/14/2025    DTAP/TDAP/TD IMMUNIZATION (3 - Td or Tdap) 05/19/2026    GLUCOSE  11/29/2026    ADVANCE CARE PLANNING  05/02/2028    COLORECTAL CANCER SCREENING  11/22/2028    HEPATITIS C SCREENING  Completed    HIV SCREENING  Completed    PHQ-2 (once per calendar year)  Completed    INFLUENZA VACCINE  Completed    ZOSTER IMMUNIZATION  Completed    RSV VACCINE (Pregnancy & 60+)  Completed    IPV IMMUNIZATION  Aged Out    HPV IMMUNIZATION  Aged Out    MENINGITIS IMMUNIZATION  Aged Out    RSV MONOCLONAL ANTIBODY  Aged Out         Review of Systems  Constitutional, HEENT, cardiovascular, pulmonary, gi and gu systems are negative, except as otherwise noted.     Objective    Exam  There were no vitals taken for this visit.   Estimated body mass index is 29.43 kg/m  as calculated from the following:    Height as of 11/29/23: 1.803 m (5' 11\").    Weight as of 11/29/23: 95.7 kg (211 lb).    Physical Exam  GENERAL: alert and no distress  EYES: Eyes grossly normal to inspection, PERRL and conjunctivae and sclerae normal  HENT: ear canals and TM's normal, nose and mouth without ulcers or lesions  NECK: no adenopathy, no asymmetry, masses, or scars  RESP: lungs clear to auscultation - no rales, rhonchi or wheezes  CV: regular rate and rhythm, normal S1 S2, no S3 or S4, no murmur, click or rub, no peripheral edema  ABDOMEN: soft, nontender, no hepatosplenomegaly, no masses and bowel sounds normal  MS: no gross musculoskeletal defects noted, no edema  SKIN: no suspicious lesions or " rashes  NEURO: Normal strength and tone, mentation intact and speech normal  PSYCH: mentation appears normal, affect normal/bright  LYMPH: normal ant/post cervical, supraclavicular nodes  inguinal: no adenopathy        5/14/2024   Mini Cog   Clock Draw Score 2 Normal   3 Item Recall 3 objects recalled   Mini Cog Total Score 5         Vision Screen         Signed Electronically by: IRMA Joy CNP

## 2024-07-10 ENCOUNTER — OFFICE VISIT (OUTPATIENT)
Dept: DERMATOLOGY | Facility: CLINIC | Age: 64
End: 2024-07-10
Payer: COMMERCIAL

## 2024-07-10 DIAGNOSIS — Z86.018 HISTORY OF DYSPLASTIC NEVUS: ICD-10-CM

## 2024-07-10 DIAGNOSIS — L82.1 SEBORRHEIC KERATOSES: ICD-10-CM

## 2024-07-10 DIAGNOSIS — D18.01 CHERRY ANGIOMA: ICD-10-CM

## 2024-07-10 DIAGNOSIS — D22.9 MULTIPLE BENIGN NEVI: Primary | ICD-10-CM

## 2024-07-10 DIAGNOSIS — L81.4 LENTIGINES: ICD-10-CM

## 2024-07-10 PROCEDURE — 99213 OFFICE O/P EST LOW 20 MIN: CPT | Performed by: PHYSICIAN ASSISTANT

## 2024-07-10 NOTE — NURSING NOTE
Erik Youngblood's goals for this visit include:   Chief Complaint   Patient presents with    Skin Check     Pt here for annual FBSC. No concerns.        He requests these members of his care team be copied on today's visit information:     PCP: Fauzia Yepez    Referring Provider:  Referred Self, MD  No address on file    There were no vitals taken for this visit.    Do you need any medication refills at today's visit?    Jane Garza LPN on 7/10/2024 at 11:08 AM

## 2024-07-10 NOTE — LETTER
7/10/2024      Erik Youngblood  [address]  [place] [address]      Dear Colleague,    Thank you for referring your patient, Erik Youngblood, to the [facility]. Please see a copy of my visit note below.    [facility] Dermatology Note  Encounter Date: Jul 10, 2024  Office Visit      Dermatology Problem List:  FBSE: 7/10/24    1. History of DN -  mild atypia, s/p excision 11/1/18  2. ISK, s/p cryo  3. LTM  - Hyperpigmented thin papule on the right upper chest,6mm - photo 5/4/22 - unchanged 7/10/24  ____________________________________________    Assessment & Plan:  # Lesion to monitor - Hyperpigmented thin papule on the right upper chest, 6mm  - Ddx: emerging SK  - reviewed photo from 5/4/22, unchanged    # Hx of DN  - Signs and Symptoms of non-melanoma skin cancer and ABCDEs of melanoma reviewed with patient. Patient encouraged to perform monthly self skin exams and educated on how to perform them. UV precautions reviewed with patient. Patient was asked about new or changing moles/lesions on body.   - Sunscreen: Apply 20 minutes prior to going outdoors and reapply every two hours, when wet or sweating. We recommend using an SPF 30 or higher, and to use one that is water resistant.     - continue annual skin exams    # Benign findings: multiple benign nevi, lentigines, cherry angiomas, SKs  - edu on benign etiology  - Signs and Symptoms of non-melanoma skin cancer and ABCDEs of melanoma reviewed with patient. Patient encouraged to perform monthly self skin exams and educated on how to perform them. UV precautions reviewed with patient. Patient was asked about new or changing moles/lesions on body.   - Sunscreen: Apply 20 minutes prior to going outdoors and reapply every two hours, when wet or sweating. We recommend using an SPF 30 or higher, and to use one that is water resistant.     - RTC for changes    Procedures Performed:   None    Follow-up: 1 year(s)  in-person, or earlier for new or changing lesions    Staff and scribe:    Scribe Disclosure:   I, KAYLEEN KENNEY, am serving as a scribe; to document services personally performed by Addie Byers PA-C -based on data collection and the provider's statements to me.     Provider Disclosure:  I agree with above History, Review of Systems, Physical exam and Plan.  I have reviewed the content of the documentation and have edited it as needed. I have personally performed the services documented here and the documentation accurately represents those services and the decisions I have made.      Electronically signed by:    All risks, benefits and alternatives were discussed with patient.  Patient is in agreement and understands the assessment and plan.  All questions were answered.    Addie Byers PA-C, [facility]  [facility] [place]: Phone: [phone], Fax: [phone]  [facility]: Phone: [phone],  Fax: [phone]  [facility]: Phone: [phone], Fax: [phone]  ____________________________________________    CC: Skin Check (Pt here for annual FBSC. No concerns. )      Reviewed patients past medical history and pertinent chart review prior to patient's visit today.     HPI:  Mr. Erik Youngblood is a 64 year old male who presents today as a return patient for FBSC.     Today patient did not report any spots concerns. We have been monitoring a spot on the R chest. Pataient does not think it has changed. Last seen by Dr. Mcmillan 1.5 years ago.     Denied any spots of concern.     Patient is otherwise feeling well, without additional concerns.    Labs:  N/A    Physical Exam:  Vitals: There were no vitals taken for this visit.  SKIN: Total skin excluding the undergarment areas was performed. The exam included the head/face, neck, both arms, chest, back, abdomen, both legs, digits and/or nails.    - -  Shah's skin type II, has <100 nevi  - There are dome shaped bright red papules on the trunk.   - Multiple regular brown pigmented macules and papules are identified on the trunk and extremities.   - Scattered brown macules on sun exposed areas.  - There are waxy stuck on tan to brown papules on the trunk.   - Hyperpigmented thin papule on the right upper chest. Measuring 6 mm   - No other lesions of concern on areas examined.     Medications:  Current Outpatient Medications   Medication Sig Dispense Refill     amLODIPine (NORVASC) 10 MG tablet Take 1 tablet (10 mg) by mouth daily 90 tablet 3     hydrochlorothiazide (HYDRODIURIL) 25 MG tablet TAKE ONE TABLET BY MOUTH ONCE DAILY 90 tablet 3     methylcellulose (CITRUCEL) powder Take by mouth daily       rosuvastatin (CRESTOR) 20 MG tablet Take 1 tablet (20 mg) by mouth daily 90 tablet 3     tadalafil (ADCIRCA/CIALIS) 20 MG tablet Take 1 tablet (20 mg) by mouth every 24 hours 18 tablet 3     EPINEPHrine (ANY BX GENERIC EQUIV) 0.3 MG/0.3ML injection 2-pack Inject 0.3 mLs (0.3 mg) into the muscle as needed for anaphylaxis May repeat one time in 5-15 minutes if response to initial dose is inadequate. (Patient not taking: Reported on 7/10/2024) 2 each 1     No current facility-administered medications for this visit.      Past Medical/Surgical History:   Patient Active Problem List   Diagnosis     Overweight (BMI 25.0-29.9)     Essential hypertension with goal blood pressure less than 140/90     Hyperlipidemia LDL goal <130     Prostate cancer (H)     Status post prostatectomy     History of adenomatous polyp of colon     Advance care planning     Spinal stenosis     Trigger finger, right middle finger     Erectile dysfunction, unspecified erectile dysfunction type     Past Medical History:   Diagnosis Date     Adenomatous colon polyp      Allergic conjunctivitis      Cancer (H) 12/22/2014    Prostate Cancer / Removed     Complication of anesthesia     nausea      Hypercholesterolemia      Hypertension      Obesity, Class I, BMI 30-34.9 05/19/2016     Spinal stenosis      Trigger finger, right middle finger                         Again, thank you for allowing me to participate in the care of your patient.        Sincerely,        Addie Byers PA-C

## 2024-07-10 NOTE — PROGRESS NOTES
[facility] Dermatology Note  Encounter Date: Jul 10, 2024  Office Visit      Dermatology Problem List:  FBSE: 7/10/24    1. History of DN -  mild atypia, s/p excision 11/1/18  2. ISK, s/p cryo  3. LTM  - Hyperpigmented thin papule on the right upper chest,6mm - photo 5/4/22 - unchanged 7/10/24  ____________________________________________    Assessment & Plan:  # Lesion to monitor - Hyperpigmented thin papule on the right upper chest, 6mm  - Ddx: emerging SK  - reviewed photo from 5/4/22, unchanged    # Hx of DN  - Signs and Symptoms of non-melanoma skin cancer and ABCDEs of melanoma reviewed with patient. Patient encouraged to perform monthly self skin exams and educated on how to perform them. UV precautions reviewed with patient. Patient was asked about new or changing moles/lesions on body.   - Sunscreen: Apply 20 minutes prior to going outdoors and reapply every two hours, when wet or sweating. We recommend using an SPF 30 or higher, and to use one that is water resistant.     - continue annual skin exams    # Benign findings: multiple benign nevi, lentigines, cherry angiomas, SKs  - edu on benign etiology  - Signs and Symptoms of non-melanoma skin cancer and ABCDEs of melanoma reviewed with patient. Patient encouraged to perform monthly self skin exams and educated on how to perform them. UV precautions reviewed with patient. Patient was asked about new or changing moles/lesions on body.   - Sunscreen: Apply 20 minutes prior to going outdoors and reapply every two hours, when wet or sweating. We recommend using an SPF 30 or higher, and to use one that is water resistant.     - RTC for changes    Procedures Performed:   None    Follow-up: 1 year(s) in-person, or earlier for new or changing lesions    Staff and scribe:    Scribe Disclosure:   KAYLEEN PERRY, am serving as a scribe; to document services personally performed by Addie Byers PA-C -based on data collection and the  provider's statements to me.     Provider Disclosure:  I agree with above History, Review of Systems, Physical exam and Plan.  I have reviewed the content of the documentation and have edited it as needed. I have personally performed the services documented here and the documentation accurately represents those services and the decisions I have made.      Electronically signed by:    All risks, benefits and alternatives were discussed with patient.  Patient is in agreement and understands the assessment and plan.  All questions were answered.    Addie Byers PA-C, MPAS  [facility]: Phone: [phone], Fax: [phone]  [facility]: Phone: [phone],  Fax: [phone]  [facility]: Phone: [phone], Fax: [phone]  ____________________________________________    CC: Skin Check (Pt here for annual FBSC. No concerns. )      Reviewed patients past medical history and pertinent chart review prior to patient's visit today.     HPI:  Mr. Erik Youngblood is a 64 year old male who presents today as a return patient for FBSC.     Today patient did not report any spots concerns. We have been monitoring a spot on the R chest. Pataient does not think it has changed. Last seen by Dr. Mcmillan 1.5 years ago.     Denied any spots of concern.     Patient is otherwise feeling well, without additional concerns.    Labs:  N/A    Physical Exam:  Vitals: There were no vitals taken for this visit.  SKIN: Total skin excluding the undergarment areas was performed. The exam included the head/face, neck, both arms, chest, back, abdomen, both legs, digits and/or nails.    - - Shah's skin type II, has <100 nevi  - There are dome shaped bright red papules on the trunk.   - Multiple regular brown pigmented macules and papules are identified on the trunk and extremities.   - Scattered brown macules on sun exposed  areas.  - There are waxy stuck on tan to brown papules on the trunk.   - Hyperpigmented thin papule on the right upper chest. Measuring 6 mm   - No other lesions of concern on areas examined.     Medications:  Current Outpatient Medications   Medication Sig Dispense Refill    amLODIPine (NORVASC) 10 MG tablet Take 1 tablet (10 mg) by mouth daily 90 tablet 3    hydrochlorothiazide (HYDRODIURIL) 25 MG tablet TAKE ONE TABLET BY MOUTH ONCE DAILY 90 tablet 3    methylcellulose (CITRUCEL) powder Take by mouth daily      rosuvastatin (CRESTOR) 20 MG tablet Take 1 tablet (20 mg) by mouth daily 90 tablet 3    tadalafil (ADCIRCA/CIALIS) 20 MG tablet Take 1 tablet (20 mg) by mouth every 24 hours 18 tablet 3    EPINEPHrine (ANY BX GENERIC EQUIV) 0.3 MG/0.3ML injection 2-pack Inject 0.3 mLs (0.3 mg) into the muscle as needed for anaphylaxis May repeat one time in 5-15 minutes if response to initial dose is inadequate. (Patient not taking: Reported on 7/10/2024) 2 each 1     No current facility-administered medications for this visit.      Past Medical/Surgical History:   Patient Active Problem List   Diagnosis    Overweight (BMI 25.0-29.9)    Essential hypertension with goal blood pressure less than 140/90    Hyperlipidemia LDL goal <130    Prostate cancer (H)    Status post prostatectomy    History of adenomatous polyp of colon    Advance care planning    Spinal stenosis    Trigger finger, right middle finger    Erectile dysfunction, unspecified erectile dysfunction type     Past Medical History:   Diagnosis Date    Adenomatous colon polyp     Allergic conjunctivitis     Cancer (H) 12/22/2014    Prostate Cancer / Removed    Complication of anesthesia     nausea    Hypercholesterolemia     Hypertension     Obesity, Class I, BMI 30-34.9 05/19/2016    Spinal stenosis     Trigger finger, right middle finger

## 2024-07-10 NOTE — PATIENT INSTRUCTIONS
Patient Education       Proper skin care from [facility]:    -Eliminate harsh soaps as they strip the natural oils from the skin, often resulting in dry itchy skin ( i.e. Dial, Zest, [place])  -Use mild soaps such as Cetaphil or Dove Sensitive Skin in the shower. You do not need to use soap on arms, legs, and trunk every time you shower unless visibly soiled.   -Avoid hot or cold showers.  -After showering, lightly dry off and apply moisturizing within 2-3 minutes. This will help trap moisture in the skin.   -Aggressive use of a moisturizer at least 1-2 times a day to the entire body (including -Vanicream, Cetaphil, Aquaphor or Cerave) and moisturize hands after every washing.  -We recommend using moisturizers that come in a tub that needs to be scooped out, not a pump. This has more of an oil base. It will hold moisture in your skin much better than a water base moisturizer. The above recommended are non-pore clogging.      Wear a sunscreen with at least SPF 30 on your face, ears, neck and V of the chest daily. Wear sunscreen on other areas of the body if those areas are exposed to the sun throughout the day. Sunscreens can contain physical and/or chemical blockers. Physical blockers are less likely to clog pores, these include zinc oxide and titanium dioxide. Reapply every two hour and after swimming.     Sunscreen examples: https://www.ewg.org/sunscreen/    UV radiation  UVA radiation remains constant throughout the day and throughout the year. It is a longer wavelength than UVB and therefore penetrates deeper into the skin leading to immediate and delayed tanning, photoaging, and skin cancer. 70-80% of UVA and UVB radiation occurs between the hours of 10am-2pm.  UVB radiation  UVB radiation causes the most harmful effects and is more significant during the summer months. However, snow and ice can reflect UVB radiation leading to skin damage during the winter months as well. UVB radiation is  responsible for tanning, burning, inflammation, delayed erythema (pinkness), pigmentation (brown spots), and skin cancer.     I recommend self monthly full body exams and yearly full body exams with a dermatology provider. If you develop a new or changing lesion please follow up for examination. Most skin cancers are pink and scaly or pink and pearly. However, we do see blue/brown/black skin cancers.  Consider the ABCDEs of melanoma when giving yourself your monthly full body exam ( don't forget the groin, buttocks, feet, toes, etc). A-asymmetry, B-borders, C-color, D-diameter, E-elevation or evolving. If you see any of these changes please follow up in clinic. If you cannot see your back I recommend purchasing a hand held mirror to use with a larger wall mirror.       Checking for Skin Cancer  You can find cancer early by checking your skin each month. There are 3 kinds of skin cancer. They are melanoma, basal cell carcinoma, and squamous cell carcinoma. Doing monthly skin checks is the best way to find new marks or skin changes. Follow the instructions below for checking your skin.   The ABCDEs of checking moles for melanoma   Check your moles or growths for signs of melanoma using ABCDE:   Asymmetry: the sides of the mole or growth don t match  Border: the edges are ragged, notched, or blurred  Color: the color within the mole or growth varies  Diameter: the mole or growth is larger than 6 mm (size of a pencil eraser)  Evolving: the size, shape, or color of the mole or growth is changing (evolving is not shown in the images below)    Checking for other types of skin cancer  Basal cell carcinoma or squamous cell carcinoma have symptoms such as:     A spot or mole that looks different from all other marks on your skin  Changes in how an area feels, such as itching, tenderness, or pain  Changes in the skin's surface, such as oozing, bleeding, or scaliness  A sore that does not heal  New swelling or redness beyond  the border of a mole    Who s at risk?  Anyone can get skin cancer. But you are at greater risk if you have:   Fair skin, light-colored hair, or light-colored eyes  Many moles or abnormal moles on your skin  A history of sunburns from sunlight or tanning beds  A family history of skin cancer  A history of exposure to radiation or chemicals  A weakened immune system  If you have had skin cancer in the past, you are at risk for recurring skin cancer.   How to check your skin  Do your monthly skin checkups in front of a full-length mirror. Check all parts of your body, including your:   Head (ears, face, neck, and scalp)  Torso (front, back, and sides)  Arms (tops, undersides, upper, and lower armpits)  Hands (palms, backs, and fingers, including under the nails)  Buttocks and genitals  Legs (front, back, and sides)  Feet (tops, soles, toes, including under the nails, and between toes)  If you have a lot of moles, take digital photos of them each month. Make sure to take photos both up close and from a distance. These can help you see if any moles change over time.   Most skin changes are not cancer. But if you see any changes in your skin, call your doctor right away. Only he or she can diagnose a problem. If you have skin cancer, seeing your doctor can be the first step toward getting the treatment that could save your life.   [employer] last reviewed this educational content on 4/1/2019 2000-2020 [employer]. [address] [place] [address], [place] [address]. All rights reserved. This information is not intended as a substitute for professional medical care. Always follow your healthcare professional's instructions.

## 2024-08-16 ENCOUNTER — ANCILLARY PROCEDURE (OUTPATIENT)
Dept: CT IMAGING | Facility: CLINIC | Age: 64
End: 2024-08-16
Attending: UROLOGY
Payer: COMMERCIAL

## 2024-08-16 DIAGNOSIS — N28.1 RENAL CYST, RIGHT: ICD-10-CM

## 2024-08-16 PROCEDURE — 74178 CT ABD&PLV WO CNTR FLWD CNTR: CPT | Mod: GC | Performed by: RADIOLOGY

## 2024-08-16 RX ORDER — IOPAMIDOL 755 MG/ML
116 INJECTION, SOLUTION INTRAVASCULAR ONCE
Status: COMPLETED | OUTPATIENT
Start: 2024-08-16 | End: 2024-08-16

## 2024-08-16 RX ADMIN — IOPAMIDOL 116 ML: 755 INJECTION, SOLUTION INTRAVASCULAR at 08:32

## 2024-11-19 DIAGNOSIS — E78.5 HYPERLIPIDEMIA LDL GOAL <130: ICD-10-CM

## 2024-11-19 RX ORDER — ROSUVASTATIN CALCIUM 20 MG/1
20 TABLET, COATED ORAL DAILY
Qty: 90 TABLET | Refills: 0 | Status: SHIPPED | OUTPATIENT
Start: 2024-11-19

## 2025-01-07 ENCOUNTER — MYC MEDICAL ADVICE (OUTPATIENT)
Dept: FAMILY MEDICINE | Facility: CLINIC | Age: 65
End: 2025-01-07

## 2025-01-07 ENCOUNTER — LAB (OUTPATIENT)
Dept: LAB | Facility: CLINIC | Age: 65
End: 2025-01-07
Payer: COMMERCIAL

## 2025-01-07 ENCOUNTER — OFFICE VISIT (OUTPATIENT)
Dept: UROLOGY | Facility: CLINIC | Age: 65
End: 2025-01-07
Payer: COMMERCIAL

## 2025-01-07 DIAGNOSIS — E78.5 HYPERLIPIDEMIA LDL GOAL <130: ICD-10-CM

## 2025-01-07 DIAGNOSIS — I10 ESSENTIAL HYPERTENSION WITH GOAL BLOOD PRESSURE LESS THAN 140/90: ICD-10-CM

## 2025-01-07 DIAGNOSIS — N52.9 ERECTILE DYSFUNCTION, UNSPECIFIED ERECTILE DYSFUNCTION TYPE: Primary | ICD-10-CM

## 2025-01-07 DIAGNOSIS — C61 PROSTATE CANCER (H): ICD-10-CM

## 2025-01-07 DIAGNOSIS — N28.1 RENAL CYST, RIGHT: ICD-10-CM

## 2025-01-07 LAB
ANION GAP SERPL CALCULATED.3IONS-SCNC: 11 MMOL/L (ref 7–15)
BUN SERPL-MCNC: 13.4 MG/DL (ref 8–23)
CALCIUM SERPL-MCNC: 10.2 MG/DL (ref 8.8–10.4)
CHLORIDE SERPL-SCNC: 96 MMOL/L (ref 98–107)
CHOLEST SERPL-MCNC: 124 MG/DL
CREAT SERPL-MCNC: 1.06 MG/DL (ref 0.67–1.17)
EGFRCR SERPLBLD CKD-EPI 2021: 78 ML/MIN/1.73M2
FASTING STATUS PATIENT QL REPORTED: NO
FASTING STATUS PATIENT QL REPORTED: NO
GLUCOSE SERPL-MCNC: 106 MG/DL (ref 70–99)
HCO3 SERPL-SCNC: 30 MMOL/L (ref 22–29)
HDLC SERPL-MCNC: 59 MG/DL
LDLC SERPL CALC-MCNC: 44 MG/DL
NONHDLC SERPL-MCNC: 65 MG/DL
POTASSIUM SERPL-SCNC: 3.8 MMOL/L (ref 3.4–5.3)
PSA SERPL DL<=0.01 NG/ML-MCNC: 0.02 NG/ML (ref 0–4.5)
SODIUM SERPL-SCNC: 137 MMOL/L (ref 135–145)
TRIGL SERPL-MCNC: 105 MG/DL

## 2025-01-07 PROCEDURE — 36415 COLL VENOUS BLD VENIPUNCTURE: CPT

## 2025-01-07 PROCEDURE — 80048 BASIC METABOLIC PNL TOTAL CA: CPT

## 2025-01-07 PROCEDURE — 80061 LIPID PANEL: CPT

## 2025-01-07 PROCEDURE — 84153 ASSAY OF PSA TOTAL: CPT

## 2025-01-07 RX ORDER — VARDENAFIL HYDROCHLORIDE 20 MG/1
20 TABLET ORAL DAILY PRN
Qty: 6 TABLET | Refills: 11 | Status: SHIPPED | OUTPATIENT
Start: 2025-01-07

## 2025-01-07 NOTE — PROGRESS NOTES
[facility]   CHIEF COMPLAINT   It was my pleasure to see Erik Youngblood who is a 64 year old male for follow-up of h/o prostate cancer, Erectile dysfunction, renal cyst.      HPI   Erik Youngblood is a very pleasant 64 year old male     Initially seen 2/15/2024:  Erik Youngblood is a 63 year old male who is being seen for evaluation of RIGHT renal cyst, hsitory of prostate cancer     History of prostate cancer with RALP/BPLND in 2015 with final pathology showing [place] 3+4 = 7 prostate cancer  He has had an undetectable or very low PSA since that time would last PSA in 2021 of 0.02    Found recently to have incidental right renal cyst and referred to discuss this and reviewed these images  He denies any flank pain    TODAY 1/7/2025:  Follow-up today to review his PSA as well as his updated CT scan  Also notes longstanding erectile dysfunction  The tadalafil 20mg with bothersome side effects with GERD for several days afterward  He believes that he tried sildenafil in the past    PHYSICAL EXAM  Patient is a 64 year old  male   Vitals: There were no vitals taken for this visit.  There is no height or weight on file to calculate BMI.  General Appearance Adult:   Alert, no acute distress, oriented  HENT: throat/mouth:normal, good dentition  Lungs: no respiratory distress, or pursed lip breathing  Heart: No obvious jugular venous distension present  Neuro: Alert, oriented, speech and mentation normal  Psych: affect and mood normal  Gait: Normal     PSA PSA Tumor Marker   Latest Ref Rng 0.00 - 4.00 ug/L 0.00 - 4.50 ng/mL   12/19/2019 <0.01     10/13/2021 0.02     2/15/2024  0.02    1/7/2025  0.02        PATHOLOGY:  3/20/2015:  FINAL DIAGNOSIS   A.     Prostate, prostatectomy  Adenocarcinoma, [place]   score 3 + 4 = 7 (of 10).  See synoptic summary.   B.     Lymph node, right pelvic, biopsy  Connective tissue,   negative for carcinoma.  No lymph node identified.   C.     Lymph node, left pelvic, biopsy  One lymph  node, negative   for carcinoma.      IMAGING:  All pertinent imaging reviewed:     All imaging studies reviewed by me.  I personally reviewed these imaging films.  A formal report from radiology will follow.     MRI PROSTATE 12/12/2023:  FINDINGS: Right inferior pole cyst with a few thin septations and/or  exophytic daughter cyst measuring 5.4 cm in maximal dimension. No  definite nodular enhancing component or other aggressive feature  demonstrated. Solid renal lesions are evident. No hydronephrosis.  Adrenal glands unremarkable. No renal cysts evident. No hepatic  steatosis. Remaining visualized abdomen unremarkable.                                                      IMPRESSION: Mildly complex cyst in the right kidney. This can be  followed by ultrasound in six months to evaluate for stability.     CT RENAL MASS 8/16/2024:  IMPRESSION:   Unchanged septated mildly lobulated cyst at the lower pole of the  right kidney compared to 12/12/2023. No abnormal enhancement or other  worrisome features.    ASSESSMENT and PLAN  64-year-old man with history of [place] 3+4 = 7 prostate cancer status post a prostatectomy in 2015, incidental right renal cyst, and erectile dysfunction    Prostate cancer  - Again reviewed his pathology from surgery  - His PSA has been stable at 0.02 for the last several years  - We discussed that given that he is 10 years out from surgery his likelihood of any meaningful recurrence is extraordinarily low  - Recommend continued periodic PSA monitoring every 1 to 2 years    Incidental right renal cyst  - I reviewed his updated imaging from August and reviewed these images personally.  This appears consistent with a benign septated cyst and no further imaging is needed    Erectile dysfunction  - We discussed pathophysiology of erectile dysfunction  - He has tried tadalafil with bothersome side effects  - We discussed trial of various PDE 5 inhibitors and prescription for Levitra sent to the pharmacy  and he will update us on MyChart of his response  - If this does not provide adequate results we could try avalafil    Pending treatment for his erectile dysfunction, he may follow-up mainly on a as needed basis    Time spent: 20 minutes spent on the date of the encounter doing chart review, history and exam, documentation and further activities as noted above.    Amaury Garcia MD   Urology  [facility]  [facility] Phone: [phone]  [facility] Phone: [phone]

## 2025-01-07 NOTE — NURSING NOTE
Erik Youngblood's goals for this visit include:   Chief Complaint   Patient presents with    RECHECK     Prostate cancer, PSA prior 1/7 0.02       He requests these members of his care team be copied on today's visit information:     PCP: Fauzia Yepez    Referring Provider:  Referred Self, MD  No address on file    There were no vitals taken for this visit.    Do you need any medication refills at today's visit?     Rema Escalera MA on 1/7/2025 at 2:11 PM

## 2025-01-09 DIAGNOSIS — E78.5 HYPERLIPIDEMIA LDL GOAL <130: ICD-10-CM

## 2025-01-09 RX ORDER — ROSUVASTATIN CALCIUM 20 MG/1
20 TABLET, COATED ORAL DAILY
Qty: 90 TABLET | Refills: 0 | Status: CANCELLED | OUTPATIENT
Start: 2025-01-09

## 2025-01-09 NOTE — TELEPHONE ENCOUNTER
Pt is requesting new rx for    Rosuvastatin 10mg tabs    Did not see on active med list please verify and send new rx. Thank you!     [facility]  [phone]

## 2025-01-13 RX ORDER — ROSUVASTATIN CALCIUM 10 MG/1
10 TABLET, COATED ORAL DAILY
Qty: 90 TABLET | Refills: 3 | Status: SHIPPED | OUTPATIENT
Start: 2025-01-13

## 2025-01-13 NOTE — CONFIDENTIAL NOTE
OK to decrease Crestor dose to 10 mg daily per patient request, Lipids are well controlled and he has increased his activity and worked on diet.    Fauzia SOLIS, CNP

## 2025-05-14 SDOH — HEALTH STABILITY: PHYSICAL HEALTH: ON AVERAGE, HOW MANY MINUTES DO YOU ENGAGE IN EXERCISE AT THIS LEVEL?: 60 MIN

## 2025-05-14 SDOH — HEALTH STABILITY: PHYSICAL HEALTH: ON AVERAGE, HOW MANY DAYS PER WEEK DO YOU ENGAGE IN MODERATE TO STRENUOUS EXERCISE (LIKE A BRISK WALK)?: 5 DAYS

## 2025-05-14 ASSESSMENT — SOCIAL DETERMINANTS OF HEALTH (SDOH): HOW OFTEN DO YOU GET TOGETHER WITH FRIENDS OR RELATIVES?: THREE TIMES A WEEK

## 2025-05-15 ENCOUNTER — OFFICE VISIT (OUTPATIENT)
Dept: FAMILY MEDICINE | Facility: CLINIC | Age: 65
End: 2025-05-15
Attending: NURSE PRACTITIONER
Payer: COMMERCIAL

## 2025-05-15 VITALS
WEIGHT: 204 LBS | RESPIRATION RATE: 16 BRPM | SYSTOLIC BLOOD PRESSURE: 144 MMHG | TEMPERATURE: 97.7 F | BODY MASS INDEX: 28.56 KG/M2 | DIASTOLIC BLOOD PRESSURE: 85 MMHG | HEIGHT: 71 IN | HEART RATE: 78 BPM | OXYGEN SATURATION: 97 %

## 2025-05-15 DIAGNOSIS — I10 ESSENTIAL HYPERTENSION WITH GOAL BLOOD PRESSURE LESS THAN 140/90: ICD-10-CM

## 2025-05-15 DIAGNOSIS — Z00.00 ENCOUNTER FOR MEDICARE ANNUAL WELLNESS EXAM: Primary | ICD-10-CM

## 2025-05-15 DIAGNOSIS — Z90.79 STATUS POST PROSTATECTOMY: ICD-10-CM

## 2025-05-15 DIAGNOSIS — C61 PROSTATE CANCER (H): ICD-10-CM

## 2025-05-15 DIAGNOSIS — E78.5 HYPERLIPIDEMIA LDL GOAL <130: ICD-10-CM

## 2025-05-15 RX ORDER — AMLODIPINE BESYLATE 10 MG/1
10 TABLET ORAL DAILY
Qty: 90 TABLET | Refills: 3 | Status: SHIPPED | OUTPATIENT
Start: 2025-05-15

## 2025-05-15 RX ORDER — HYDROCHLOROTHIAZIDE 25 MG/1
TABLET ORAL
Qty: 90 TABLET | Refills: 3 | Status: SHIPPED | OUTPATIENT
Start: 2025-05-15

## 2025-05-15 RX ORDER — ROSUVASTATIN CALCIUM 10 MG/1
10 TABLET, COATED ORAL DAILY
Qty: 90 TABLET | Refills: 3 | Status: SHIPPED | OUTPATIENT
Start: 2025-05-15

## 2025-05-15 NOTE — PROGRESS NOTES
"Preventive Care Visit  [facility]  IRMA Joy CNP, Family Medicine  May 15, 2025      Assessment & Plan     Encounter for Medicare annual wellness exam  Appropriate preventive services were addressed with this patient via screening, questionnaire, or discussion as appropriate for fall prevention, nutrition, physical activity, tobacco-use cessation, social engagement, weight loss and cognition.  Checklist reviewing preventive services available has been given to the patient.      - Adult Eye  Referral    Status post prostatectomy  Followed by Urology, doing very well, stable on     Essential hypertension with goal blood pressure less than 140/90  BP elevated in clinic but home reading are 120-130/70-80's.  - amLODIPine (NORVASC) 10 MG tablet  Dispense: 90 tablet; Refill: 3  - hydrochlorothiazide (HYDRODIURIL) 25 MG tablet  Dispense: 90 tablet; Refill: 3    Prostate cancer (H)  History of Prostatectomy, followed by Urol[facility]oy     Hyperlipidemia LDL goal <130  Stable.  - rosuvastatin (CRESTOR) 10 MG tablet  Dispense: 90 tablet; Refill: 3                BMI  Estimated body mass index is 28.45 kg/m  as calculated from the following:    Height as of this encounter: 1.803 m (5' 11\").    Weight as of this encounter: 92.5 kg (204 lb).       Counseling  Appropriate preventive services were addressed with this patient via screening, questionnaire, or discussion as appropriate for fall prevention, nutrition, physical activity, Tobacco-use cessation, social engagement, weight loss and cognition.  Checklist reviewing preventive services available has been given to the patient.  Reviewed patient's diet, addressing concerns and/or questions.       Follow-up    Follow-up Visit   Expected date:  May 22, 2026 (Approximate)      Follow Up Appointment Details:     Follow-up with whom?: PCP    Follow-Up for what?: Medicare Wellness    Welcome or Annual?: Annual Wellness    How?: In Person    "              Subjective   Lester is a 65 year old, presenting for the following:  Physical        5/15/2025     9:14 AM   Additional Questions   Roomed by madden   Accompanied by self         5/15/2025     9:14 AM   Patient Reported Additional Medications   Patient reports taking the following new medications no           HPI       Hyperlipidemia Follow-Up    Are you regularly taking any medication or supplement to lower your cholesterol?   Yes- Crestor 10 mg daily  Are you having muscle aches or other side effects that you think could be caused by your cholesterol lowering medication?  No    Hypertension Follow-up    Do you check your blood pressure regularly outside of the clinic? Yes   Are you following a low salt diet? Yes  Are your blood pressures ever more than 140 on the top number (systolic) OR more   than 90 on the bottom number (diastolic), for example 140/90? No    BP Readings from Last 2 Encounters:   05/15/25 (!) 144/85   05/14/24 (!) 150/79     History prostate cancer s/p prostatectomy  12/22/24, followed by Urology on prn basis, PSA tumor marker 1/7/2015 was 0.02, Urology recommends PSA monitoring every 1-2 years.      He is loving care home, playing [employer] ball three times a week, is involved in family activities, has a cabin which he enjoys.      Advance Care Planning    Discussed advance care planning with patient; informed AVS has link to [employer].        5/14/2025   General Health   How would you rate your overall physical health? Excellent   Feel stress (tense, anxious, or unable to sleep) Not at all         5/14/2025   Nutrition   Diet: Low salt         5/14/2025   Exercise   Days per week of moderate/strenous exercise 5 days   Average minutes spent exercising at this level 60 min         5/14/2025   Social Factors   Frequency of gathering with friends or relatives Three times a week   Worry food won't last until get money to buy more No   Food not last or not have enough money for food? No    Do you have housing? (Housing is defined as stable permanent housing and does not include staying outside in a car, in a tent, in an abandoned building, in an overnight shelter, or couch-surfing.) Yes   Are you worried about losing your housing? No   Lack of transportation? No   Unable to get utilities (heat,electricity)? No         5/14/2025   Fall Risk   Fallen 2 or more times in the past year? No    No   Trouble with walking or balance? No    No       Multiple values from one day are sorted in reverse-chronological order          5/14/2025   Activities of Daily Living- Home Safety   Needs help with the following daily activites None of the above   Safety concerns in the home None of the above         5/14/2025   Dental   Dentist two times every year? Yes         5/14/2025   Hearing Screening   Hearing concerns? None of the above         5/14/2025   Driving Risk Screening   Patient/family members have concerns about driving No         5/14/2025   General Alertness/Fatigue Screening   Have you been more tired than usual lately? No         5/14/2025   Urinary Incontinence Screening   Bothered by leaking urine in past 6 months No         Today's PHQ-2 Score:       5/14/2025    12:27 PM   PHQ-2 ( 1999 Pfizer)   Q1: Little interest or pleasure in doing things 0   Q2: Feeling down, depressed or hopeless 0   PHQ-2 Score 0    Q1: Little interest or pleasure in doing things Not at all   Q2: Feeling down, depressed or hopeless Not at all   PHQ-2 Score 0       Patient-reported           5/14/2025   Substance Use   Alcohol more than 3/day or more than 7/wk Not Applicable   Do you have a current opioid prescription? No   How severe/bad is pain from 1 to 10? 0/10 (No Pain)   Do you use any other substances recreationally? No     Social History     Tobacco Use    Smoking status: Never     Passive exposure: Never    Smokeless tobacco: Never   Vaping Use    Vaping status: Never Used   Substance Use Topics    Alcohol use: Not  Currently     Comment: rare    Drug use: No           5/14/2025   AAA Screening   Family history of Abdominal Aortic Aneurysm (AAA)? No   Last PSA:   PSA   Date Value Ref Range Status   12/19/2019 <0.01 0 - 4 ug/L Final     Comment:     Assay Method:  Chemiluminescence using Siemens Vista analyzer     Prostate Specific Antigen Screen   Date Value Ref Range Status   10/13/2021 0.02 0.00 - 4.00 ug/L Final     PSA Tumor Marker   Date Value Ref Range Status   01/07/2025 0.02 0.00 - 4.50 ng/mL Final     ASCVD Risk   The ASCVD Risk score (Darien REGALADO, et al., 2019) failed to calculate for the following reasons:    The valid total cholesterol range is 130 to 320 mg/dL    Reviewed and updated as needed this visit by Provider                    Past Medical History:   Diagnosis Date    Adenomatous colon polyp     Allergic conjunctivitis     Cancer (H) 12/22/2014    Prostate Cancer / Removed    Complication of anesthesia     nausea    Hypercholesterolemia     Hypertension     Obesity, Class I, BMI 30-34.9 05/19/2016    Spinal stenosis     Trigger finger, right middle finger      Past Surgical History:   Procedure Laterality Date    APPENDECTOMY      COLONOSCOPY  8/9/2016    All was good    COLONOSCOPY N/A 11/22/2021    Procedure: COLONOSCOPY, FLEXIBLE, WITH LESION REMOVAL USING SNARE;  Surgeon: Chacho Holguin DO;  Location: [facility]    COLONOSCOPY WITH CO2 INSUFFLATION N/A 8/9/2016    Procedure: COLONOSCOPY WITH CO2 INSUFFLATION;  Surgeon: Evan Brown MD;  Location: [facility]    COLONOSCOPY WITH CO2 INSUFFLATION N/A 11/22/2021    Procedure: COLONOSCOPY, WITH CO2 INSUFFLATION;  Surgeon: Chacho Holguin DO;  Location: [facility]    LAPAROSCOPIC PROSTATECTOMY, LYMPHADENECTOMY, COMBINED  3/20/15    robot assisted, Dr. Anton Alonso    RELEASE TRIGGER FINGER Right     Right 3rd trigger finger release.     RELEASE TRIGGER FINGER Right 9/15/2021    Procedure: RELEASE, RIGHT MIDDLE TRIGGER FINGER;  Surgeon: Sebastian Payan  MD Mary Grace;  Location: [facility] OR    REPAIR PTOSIS      REPAIR PTOSIS BILATERAL  11/23/2012    Procedure: REPAIR PTOSIS BILATERAL;  BILATERAL UPPER LID MECHANICAL PTOSIS REPAIR AND BILATERAL BROWN PTOSIS;  Surgeon: Lowell Wood MD;  Location: [facility]    REPAIR PTOSIS BROW BILATERAL  11/23/2012    Procedure: REPAIR PTOSIS BROW BILATERAL;;  Surgeon: Lowell Wood MD;  Location: [facility]    VASECTOMY       Labs reviewed in EPIC  Current providers sharing in care for this patient include:  Patient Care Team:  Fauzia Yepez APRN CNP as PCP - General (Nurse Practitioner - Family)  Ramona Aguilar [facility] as Pharmacist (Pharmacist)  Brittaney Pickett [facility] as Pharmacist (Pharmacist)  Mike Mcmillan MD as MD (Dermatology)  Adam Aguilera OD (Optometry)  Fauzia Yepez APRN CNP as Referring Physician (Family Medicine)  Fauzia Yepez APRN CNP as Assigned PCP  Amaury Garcia MD as MD (Urology)  Addie Byers PA-C as Physician Assistant (Dermatology)  Amaury Garcia MD as Assigned Surgical Provider  Addie Byers PA-C as Assigned Dermatology Provider    The following health maintenance items are reviewed in Epic and correct as of today:  Health Maintenance   Topic Date Due    EYE EXAM  05/25/2024    COVID-19 Vaccine (7 - 2024-25 season) 04/01/2025    ANNUAL REVIEW OF HM ORDERS  05/14/2025    BMP  01/07/2026    LIPID  01/07/2026    MEDICARE ANNUAL WELLNESS VISIT  05/15/2026    FALL RISK ASSESSMENT  05/15/2026    DTAP/TDAP/TD IMMUNIZATION (3 - Td or Tdap) 05/19/2026    DIABETES SCREENING  01/07/2028    COLORECTAL CANCER SCREENING  11/22/2028    ADVANCE CARE PLANNING  06/17/2029    HEPATITIS C SCREENING  Completed    PHQ-2 (once per calendar year)  Completed    INFLUENZA VACCINE  Completed    Pneumococcal Vaccine: 50+ Years  Completed    ZOSTER IMMUNIZATION  Completed    RSV VACCINE  Completed    HPV IMMUNIZATION  Aged Out    MENINGITIS IMMUNIZATION  Aged Out         Review of Systems      "  Objective    Exam  BP (!) 144/85 (BP Location: Right arm, Patient Position: Sitting, Cuff Size: Adult Large)   Pulse 78   Temp 97.7  F (36.5  C) (Temporal)   Resp 16   Ht 1.803 m (5' 11\")   Wt 92.5 kg (204 lb)   SpO2 97%   BMI 28.45 kg/m     Estimated body mass index is 28.45 kg/m  as calculated from the following:    Height as of this encounter: 1.803 m (5' 11\").    Weight as of this encounter: 92.5 kg (204 lb).    Physical Exam  GENERAL: alert and no distress  EYES: Eyes grossly normal to inspection, PERRL and conjunctivae and sclerae normal  HENT: ear canals and TM's normal, nose and mouth without ulcers or lesions  NECK: no adenopathy, no asymmetry, masses, or scars  RESP: lungs clear to auscultation - no rales, rhonchi or wheezes  CV: regular rate and rhythm, normal S1 S2, no S3 or S4, no murmur, click or rub, no peripheral edema  ABDOMEN: soft, nontender, no hepatosplenomegaly, no masses and bowel sounds normal  MS: no gross musculoskeletal defects noted, no edema  SKIN: no suspicious lesions or rashes  NEURO: Normal strength and tone, mentation intact and speech normal  PSYCH: mentation appears normal, affect normal/bright  LYMPH: normal ant/post cervical, supraclavicular nodes  inguinal: no adenopathy        5/15/2025   Mini Cog   Clock Draw Score 0 Abnormal   3 Item Recall 3 objects recalled   Mini Cog Total Score 3             5/15/2025   Vision Screen   Vision Screen Results Pass   No Corrective Lenses, PLUS LENS REQUIRED Pass       Signed Electronically by: IRMA Joy CNP    "

## 2025-05-15 NOTE — PATIENT INSTRUCTIONS
Patient Education   Preventive Care Advice   This is general advice given by our system to help you stay healthy. However, your care team may have specific advice just for you. Please talk to your care team about your preventive care needs.  Nutrition  Eat 5 or more servings of fruits and vegetables each day.  Try wheat bread, brown rice and whole grain pasta (instead of white bread, rice, and pasta).  Get enough calcium and vitamin D. Check the label on foods and aim for 100% of the RDA (recommended daily allowance).  Lifestyle  Exercise at least 150 minutes each week  (30 minutes a day, 5 days a week).  Do muscle strengthening activities 2 days a week. These help control your weight and prevent disease.  No smoking.  Wear sunscreen to prevent skin cancer.  Have a dental exam and cleaning every 6 months.  Yearly exams  See your health care team every year to talk about:  Any changes in your health.  Any medicines your care team has prescribed.  Preventive care, family planning, and ways to prevent chronic diseases.  Shots (vaccines)   HPV shots (up to age 26), if you've never had them before.  Hepatitis B shots (up to age 59), if you've never had them before.  COVID-19 shot: Get this shot when it's due.  Flu shot: Get a flu shot every year.  Tetanus shot: Get a tetanus shot every 10 years.  Pneumococcal, hepatitis A, and RSV shots: Ask your care team if you need these based on your risk.  Shingles shot (for age 50 and up)  General health tests  Diabetes screening:  Starting at age 35, Get screened for diabetes at least every 3 years.  If you are younger than age 35, ask your care team if you should be screened for diabetes.  Cholesterol test: At age 39, start having a cholesterol test every 5 years, or more often if advised.  Bone density scan (DEXA): At age 50, ask your care team if you should have this scan for osteoporosis (brittle bones).  Hepatitis C: Get tested at least once in your life.  STIs (sexually  transmitted infections)  Before age 24: Ask your care team if you should be screened for STIs.  After age 24: Get screened for STIs if you're at risk. You are at risk for STIs (including HIV) if:  You are sexually active with more than one person.  You don't use condoms every time.  You or a partner was diagnosed with a sexually transmitted infection.  If you are at risk for HIV, ask about PrEP medicine to prevent HIV.  Get tested for HIV at least once in your life, whether you are at risk for HIV or not.  Cancer screening tests  Cervical cancer screening: If you have a cervix, begin getting regular cervical cancer screening tests starting at age 21.  Breast cancer scan (mammogram): If you've ever had breasts, begin having regular mammograms starting at age 40. This is a scan to check for breast cancer.  Colon cancer screening: It is important to start screening for colon cancer at age 45.  Have a colonoscopy test every 10 years (or more often if you're at risk) Or, ask your provider about stool tests like a FIT test every year or Cologuard test every 3 years.  To learn more about your testing options, visit:   .  For help making a decision, visit:   https://bit.ly/mg34554.  Prostate cancer screening test: If you have a prostate, ask your care team if a prostate cancer screening test (PSA) at age 55 is right for you.  Lung cancer screening: If you are a current or former smoker ages 50 to 80, ask your care team if ongoing lung cancer screenings are right for you.  For informational purposes only. Not to replace the advice of your health care provider. Copyright   2023 [facility]. All rights reserved. Clinically reviewed by the [facility]. [employer] 771012 - REV 01/24.   At [facility], we strive to deliver an exceptional experience to you, every time we see you. If you receive a survey, please let us know what we are doing well and/or what we  could improve upon, as we do value your feedback.  If you have MyChart, you can expect to receive results automatically within 24 hours of their completion.  Your provider will send a note interpreting your results as well.   If you do not have MyChart, you should receive your results in about a week by mail.    Your care team:                            Family Medicine Internal Medicine   MD Waylon Reyes, MD Bela Pastor, MD Hardy Chan, MD Sade Hays, PA-C    Dimitrios Andino, MD Pediatrics   Claudia Alegre, MD Shante Hyatt, MD Yolanda Yepez, APRN CNP Mirela Zamudio APRN CNP   Kavya Silva, MD Amanda Brown, MD Lillie Elizabeth, CNP     Barbara Norton PAAsaelC Same-Day Provider (No follow-up visits)   IRMA Garcia, DNP Rachel Butterfield, PA-C    FREDA LimaC     Clinic hours: Monday - Thursday 7 am-6 pm; Fridays 7 am-5 pm.   Urgent care: Monday - Friday 10 am- 8 pm; Saturday and Sunday 9 am-5 pm.    Clinic: [phone]       [facility]: Monday - Thursday 8 am - 7 pm; Friday 8 am - 6 pm  [facility]: [phone]

## 2025-05-16 NOTE — TELEPHONE ENCOUNTER
Ace wrap to the left knee. Pt tolerated well.   Prescription approved per [facility] Refill Protocol.  Aviva Bob RN

## 2025-05-19 ENCOUNTER — PATIENT OUTREACH (OUTPATIENT)
Dept: CARE COORDINATION | Facility: CLINIC | Age: 65
End: 2025-05-19
Payer: COMMERCIAL

## 2025-06-02 ENCOUNTER — OFFICE VISIT (OUTPATIENT)
Dept: OPTOMETRY | Facility: CLINIC | Age: 65
End: 2025-06-02
Attending: NURSE PRACTITIONER
Payer: COMMERCIAL

## 2025-06-02 DIAGNOSIS — H52.223 REGULAR ASTIGMATISM OF BOTH EYES: ICD-10-CM

## 2025-06-02 DIAGNOSIS — Z01.00 EXAMINATION OF EYES AND VISION: Primary | ICD-10-CM

## 2025-06-02 DIAGNOSIS — H04.123 DRY EYE SYNDROME OF BOTH EYES: ICD-10-CM

## 2025-06-02 DIAGNOSIS — H52.4 PRESBYOPIA: ICD-10-CM

## 2025-06-02 DIAGNOSIS — H52.03 HYPEROPIA, BILATERAL: ICD-10-CM

## 2025-06-02 PROCEDURE — 92014 COMPRE OPH EXAM EST PT 1/>: CPT | Performed by: OPTOMETRIST

## 2025-06-02 PROCEDURE — 92015 DETERMINE REFRACTIVE STATE: CPT | Performed by: OPTOMETRIST

## 2025-06-02 ASSESSMENT — KERATOMETRY
OS_K2POWER_DIOPTERS: 44.25
OD_AXISANGLE_DEGREES: 151
OD_K2POWER_DIOPTERS: 44.00
OD_K1POWER_DIOPTERS: 43.50
OS_AXISANGLE2_DEGREES: 096
OD_AXISANGLE2_DEGREES: 061
OS_AXISANGLE_DEGREES: 006
OS_K1POWER_DIOPTERS: 44.00

## 2025-06-02 ASSESSMENT — REFRACTION_MANIFEST
OD_AXIS: 174
OS_CYLINDER: +1.50
METHOD_AUTOREFRACTION: 1
OD_CYLINDER: +1.00
OS_SPHERE: -0.75
OS_ADD: +2.25
OD_SPHERE: +0.25
OS_AXIS: 005
OD_ADD: +2.25

## 2025-06-02 ASSESSMENT — CONF VISUAL FIELD
OS_NORMAL: 1
OS_INFERIOR_NASAL_RESTRICTION: 0
OS_SUPERIOR_NASAL_RESTRICTION: 0
OS_INFERIOR_TEMPORAL_RESTRICTION: 0
OD_INFERIOR_TEMPORAL_RESTRICTION: 0
OS_SUPERIOR_TEMPORAL_RESTRICTION: 0
OD_SUPERIOR_NASAL_RESTRICTION: 0
OD_NORMAL: 1
OD_SUPERIOR_TEMPORAL_RESTRICTION: 0
OD_INFERIOR_NASAL_RESTRICTION: 0
METHOD: COUNTING FINGERS

## 2025-06-02 ASSESSMENT — VISUAL ACUITY
METHOD: SNELLEN - LINEAR
OD_PH_SC+: -1
OS_PH_SC: 20/25
OD_SC: 20/40
OD_PH_SC: 20/20
OD_SC+: -2
OS_SC: 20/30 -2
OD_SC: 20/70
OS_SC+: +2
OS_PH_SC+: -2
OS_SC: 20/40

## 2025-06-02 ASSESSMENT — REFRACTION_WEARINGRX
OS_AXIS: 005
OD_AXIS: 174
OD_ADD: +2.25
OS_CYLINDER: +2.00
OD_CYLINDER: +1.25
OD_SPHERE: PLANO
OS_ADD: +2.25
OS_SPHERE: -0.75

## 2025-06-02 ASSESSMENT — TONOMETRY
IOP_METHOD: ICARE
OD_IOP_MMHG: 21.7
OS_IOP_MMHG: 21.3

## 2025-06-02 ASSESSMENT — CUP TO DISC RATIO
OS_RATIO: 0.5
OD_RATIO: 0.5

## 2025-06-02 ASSESSMENT — EXTERNAL EXAM - LEFT EYE: OS_EXAM: NORMAL

## 2025-06-02 ASSESSMENT — SLIT LAMP EXAM - LIDS
COMMENTS: NORMAL
COMMENTS: NORMAL

## 2025-06-02 ASSESSMENT — EXTERNAL EXAM - RIGHT EYE: OD_EXAM: NORMAL

## 2025-06-02 NOTE — PROGRESS NOTES
Chief Complaint   Patient presents with    Annual Eye Exam         Last Eye Exam: 05/25/2023  Dilated Previously: Yes    What are you currently using to see?  Readers OTC+1.25 for reading only, not with today       Distance Vision Acuity: Noticed gradual change in both eyes    Near Vision Acuity: Satisfied with vision while reading and using computer with readers    Eye Comfort: dry  Do you use eye drops? : No  Occupation or Hobbies: Retired- wife Krystin Pandya- -goes to [employer]     History of BULB- Dr. Wood 11/23/2012    Shakila Armendariz    Optometric Assistant     Medical, surgical and family histories reviewed and updated 6/2/2025.       OBJECTIVE: See Ophthalmology exam    ASSESSMENT:    ICD-10-CM    1. Examination of eyes and vision  Z01.00       2. Presbyopia  H52.4       3. Hyperopia, bilateral  H52.03       4. Regular astigmatism of both eyes  H52.223       5. Dry eye syndrome of both eyes  H04.123           PLAN:     Patient Instructions   Eyeglass prescription given.    Heat to the eyes daily for 10-15 minutes nightly with warm washcloth or reusable gel masks from the pharmacy or  [employer] heat masks can be purchased at Amazon.    Artificial tears- 1 drop both eyes once before bedtime and once in the morning then 2 x day as needed.      Return in 1 year for a complete eye exam or sooner if needed.    Adam Aguilera, OD

## 2025-06-02 NOTE — PATIENT INSTRUCTIONS
Eyeglass prescription given.    Heat to the eyes daily for 10-15 minutes nightly with warm washcloth or reusable gel masks from the pharmacy or  [employer] heat masks can be purchased at Amazon.    Artificial tears- 1 drop both eyes once before bedtime and once in the morning then 2 x day as needed.      Return in 1 year for a complete eye exam or sooner if needed.    Adam Aguilera, OD    The affects of the dilating drops last for 4- 6 hours.  You will be more sensitive to light and vision will be blurry up close.  Do not drive if you do not feel comfortable.  Mydriatic sunglasses were given if needed.    .There is a combination of three treatments which can greatly improve symptoms of dry eyes.     Artificial tears  Heat (eyes closed)  Eyelid and eyelash cleansing (eyes closed)     Use one drop of artificial tears both eyes 4 x daily.  Once in the morning, lunch, dinner and bedtime. Continue to use the drops regardless if your eyes are comfortable or not.  Artificial tears work best as a preventative and not as well after your eyes are starting to bother you.  It may take 4- 6 weeks of using the drops before you notice improvement.  If after that time you are still having problems schedule an appointment for an evaluation and discussion of different treatments such as Restasis or Xiidra.  Dry eyes are a chronic condition and you may have more symptoms at certain times of the year.    Excess tearing can be due to the right tears not working properly or a blockage in the tear drainage system.  You can try using artificial tears 1 drop both eyes 4 x day.  If the excess tearing is bothersome after 4-6 weeks of treatment then we can send you for further testing.  This would entail a referral to our oculoplastic specialist Dr. Ele Booth at the [facility]-[phone].    Recommended brands are:    Systane Complete  Systane Ultra  Systane Balance  Refresh Advanced Optive  Refresh Relieva  Blink    Recommended brands  for contact lens wearers are:    Systane contacts  Refresh contacts  Blink contacts    If you are using drops more than 4 x day or have sensitivities to preservatives I recommend non preserved artificial tears.  These come in 1 use vials.  They can be used every 1-2 hours.  Do not reuse the vials.    Recommended brands are:    Refresh Optive Keshawn-3  Systane- preservative free  Refresh-  preservative free  Blink- preservative free    Gels or ointment can be used at night.    Recommended brands are:    Systane Gel  Refresh Gel  Blink Gel  Genteal Gel    Systane night time (ointment)  Refresh Celluvisc  Refresh PM (ointment)    Optase dry eye spray.  Spray to eyelids 3-4 x daily.  This can be purchased on Amazon.      Visine, Clear Eyes or Murine (drops that get the red out) can irritate the eyes and cause a rebound effect where the eyes become more red and you end up using more drops.  Avoid drops containing tetrahydrozoline, naphazoline, phenylephrine, oxymetazoline.      OTC Lumify is a newer product that gives immediate redness relief without the rebound effect.  Use as needed to take the redness out.    Artificial tears may be used with other drops (such as allergy, glaucoma, antibiotics) around the same time.  Be sure to wait 5 minutes in between drops.    Heat to the eyelids can also improve your symptoms of dry eyes.  [employer] heat masks can be purchased at Amazon to be used nightly for 10-15 minutes.  Other options are gel masks that can be put in the microwave and purchased at most pharmacies.      Tea Tree Oil eyelid cleansers recommended are Ocusoft Oust foam cleanser to cleanse eyelids/lashes at night and in the am. Other options are Blephadex or Cliradex eyelid wipes.  KEEP EYES CLOSED when using these products.  These can be purchased on amazon.com   A good product for make up remover with tea tree oil is WeLNanosphere.  This can be found at www.etaskr or [employer].    Other good eyelid cleansers  have hypochlorous which removes excess bacteria and is safe around the eyes. Products are Avenova, Ocusoft Hypochlor or Heyedrate. Spray solution onto cotton pad, close eyes and gently apply to eyelids and eyelashes using side to side motion.  You can also KEEP EYES CLOSED spray and rub into eyelashes.  You do not need to rinse it off. Use morning and evening. These products can be found on Amazon.  You can check with your local pharmacy and see if they can order if for you if they don't have it.    Other brands of eyelid cleansing wipes are:    Ocusoft wipes  Systane wipes    A great eye make up line is https://eyesVolpit/.      Optometry Providers       Clinic Locations                                 Telephone Number   Dr. Rosy Livingston    [facility]  [place] [phone]     [facility] Hours:                [facility] Hours:       [facility] Hours:   [address]   [address]     [address] [place] [address]   [place] [address]    [place] [address]  Phone: [phone]                    Phone: [phone]     Phone: [phone]                      Monday 8:00-6:00                          Monday 8:00-6:00                          Monday 8:00-6:00              Tuesday 8:00-6:00                          Tuesday 8:00-6:00                          Tuesday 8:00-6:00              Wednesday 8:00-6:00                  Wednesday 8:00-6:00                   Wednesday 8:00-6:00      Thursday 8:00-6:00                        Thursday 8:00-6:00                         Thursday 8:00-6:00            Friday 8:00-5:00                              Friday 8:00-5:00                              Friday 8:00-5:00    [facility] Hours:   [address] [place]  [address]  [phone]    Monday 9:00-6:00  Tuesday 9:00-6:00  Wednesday 9:00-6:00  Thursday 9:00-6:00  Friday 9:00-5:00  As always, Thank you for trusting us with your health care needs!

## 2025-06-02 NOTE — LETTER
6/2/2025      Erik Youngblood  [address]  [place] [address]      Dear Colleague,    Thank you for referring your patient, Erik Youngblood, to the [facility]. Please see a copy of my visit note below.    Chief Complaint   Patient presents with     Annual Eye Exam         Last Eye Exam: 05/25/2023  Dilated Previously: Yes    What are you currently using to see?  Readers OTC+1.25 for reading only, not with today       Distance Vision Acuity: Noticed gradual change in both eyes    Near Vision Acuity: Satisfied with vision while reading and using computer with readers    Eye Comfort: dry  Do you use eye drops? : No  Occupation or Hobbies: Retired- wife Krystin Pandya- -goes to [employer]     History of BULB- Dr. Wood 11/23/2012    Shakila Armendariz    Optometric Assistant     Medical, surgical and family histories reviewed and updated 6/2/2025.       OBJECTIVE: See Ophthalmology exam    ASSESSMENT:    ICD-10-CM    1. Examination of eyes and vision  Z01.00       2. Presbyopia  H52.4       3. Hyperopia, bilateral  H52.03       4. Regular astigmatism of both eyes  H52.223       5. Dry eye syndrome of both eyes  H04.123           PLAN:     Patient Instructions   Eyeglass prescription given.    Heat to the eyes daily for 10-15 minutes nightly with warm washcloth or reusable gel masks from the pharmacy or  ViroXis heat masks can be purchased at [employer].    Artificial tears- 1 drop both eyes once before bedtime and once in the morning then 2 x day as needed.      Return in 1 year for a complete eye exam or sooner if needed.    Adam Aguilera OD        Again, thank you for allowing me to participate in the care of your patient.        Sincerely,        Adam Aguilera, JEFF    Electronically signed

## 2025-07-16 ENCOUNTER — OFFICE VISIT (OUTPATIENT)
Dept: DERMATOLOGY | Facility: CLINIC | Age: 65
End: 2025-07-16
Payer: COMMERCIAL

## 2025-07-16 DIAGNOSIS — L82.1 SK (SEBORRHEIC KERATOSIS): ICD-10-CM

## 2025-07-16 DIAGNOSIS — Z86.018 HISTORY OF DYSPLASTIC NEVUS: ICD-10-CM

## 2025-07-16 DIAGNOSIS — Z12.83 SCREENING EXAM FOR SKIN CANCER: ICD-10-CM

## 2025-07-16 DIAGNOSIS — L81.4 LENTIGINES: ICD-10-CM

## 2025-07-16 DIAGNOSIS — D18.01 CHERRY ANGIOMA: ICD-10-CM

## 2025-07-16 DIAGNOSIS — D22.9 MULTIPLE BENIGN NEVI: Primary | ICD-10-CM

## 2025-07-16 ASSESSMENT — PAIN SCALES - GENERAL: PAINLEVEL_OUTOF10: NO PAIN (0)

## 2025-07-16 NOTE — PATIENT INSTRUCTIONS
Patient Education       Proper skin care from [facility]:    -Eliminate harsh soaps as they strip the natural oils from the skin, often resulting in dry itchy skin ( i.e. Dial, Zest, Setswana Spring)  -Use mild soaps such as Cetaphil or Dove Sensitive Skin in the shower. You do not need to use soap on arms, legs, and trunk every time you shower unless visibly soiled.   -Avoid hot or cold showers.  -After showering, lightly dry off and apply moisturizing within 2-3 minutes. This will help trap moisture in the skin.   -Aggressive use of a moisturizer at least 1-2 times a day to the entire body (including -Vanicream, Cetaphil, Aquaphor or Cerave) and moisturize hands after every washing.  -We recommend using moisturizers that come in a tub that needs to be scooped out, not a pump. This has more of an oil base. It will hold moisture in your skin much better than a water base moisturizer. The above recommended are non-pore clogging.      Wear a sunscreen with at least SPF 30 on your face, ears, neck and V of the chest daily. Wear sunscreen on other areas of the body if those areas are exposed to the sun throughout the day. Sunscreens can contain physical and/or chemical blockers. Physical blockers are less likely to clog pores, these include zinc oxide and titanium dioxide. Reapply every two hour and after swimming.     Sunscreen examples: https://www.ewg.org/sunscreen/    UV radiation  UVA radiation remains constant throughout the day and throughout the year. It is a longer wavelength than UVB and therefore penetrates deeper into the skin leading to immediate and delayed tanning, photoaging, and skin cancer. 70-80% of UVA and UVB radiation occurs between the hours of 10am-2pm.  UVB radiation  UVB radiation causes the most harmful effects and is more significant during the summer months. However, snow and ice can reflect UVB radiation leading to skin damage during the winter months as well. UVB radiation is  responsible for tanning, burning, inflammation, delayed erythema (pinkness), pigmentation (brown spots), and skin cancer.     I recommend self monthly full body exams and yearly full body exams with a dermatology provider. If you develop a new or changing lesion please follow up for examination. Most skin cancers are pink and scaly or pink and pearly. However, we do see blue/brown/black skin cancers.  Consider the ABCDEs of melanoma when giving yourself your monthly full body exam ( don't forget the groin, buttocks, feet, toes, etc). A-asymmetry, B-borders, C-color, D-diameter, E-elevation or evolving. If you see any of these changes please follow up in clinic. If you cannot see your back I recommend purchasing a hand held mirror to use with a larger wall mirror.       Checking for Skin Cancer  You can find cancer early by checking your skin each month. There are 3 kinds of skin cancer. They are melanoma, basal cell carcinoma, and squamous cell carcinoma. Doing monthly skin checks is the best way to find new marks or skin changes. Follow the instructions below for checking your skin.   The ABCDEs of checking moles for melanoma   Check your moles or growths for signs of melanoma using ABCDE:   Asymmetry: the sides of the mole or growth don t match  Border: the edges are ragged, notched, or blurred  Color: the color within the mole or growth varies  Diameter: the mole or growth is larger than 6 mm (size of a pencil eraser)  Evolving: the size, shape, or color of the mole or growth is changing (evolving is not shown in the images below)    Checking for other types of skin cancer  Basal cell carcinoma or squamous cell carcinoma have symptoms such as:     A spot or mole that looks different from all other marks on your skin  Changes in how an area feels, such as itching, tenderness, or pain  Changes in the skin's surface, such as oozing, bleeding, or scaliness  A sore that does not heal  New swelling or redness beyond  the border of a mole    Who s at risk?  Anyone can get skin cancer. But you are at greater risk if you have:   Fair skin, light-colored hair, or light-colored eyes  Many moles or abnormal moles on your skin  A history of sunburns from sunlight or tanning beds  A family history of skin cancer  A history of exposure to radiation or chemicals  A weakened immune system  If you have had skin cancer in the past, you are at risk for recurring skin cancer.   How to check your skin  Do your monthly skin checkups in front of a full-length mirror. Check all parts of your body, including your:   Head (ears, face, neck, and scalp)  Torso (front, back, and sides)  Arms (tops, undersides, upper, and lower armpits)  Hands (palms, backs, and fingers, including under the nails)  Buttocks and genitals  Legs (front, back, and sides)  Feet (tops, soles, toes, including under the nails, and between toes)  If you have a lot of moles, take digital photos of them each month. Make sure to take photos both up close and from a distance. These can help you see if any moles change over time.   Most skin changes are not cancer. But if you see any changes in your skin, call your doctor right away. Only he or she can diagnose a problem. If you have skin cancer, seeing your doctor can be the first step toward getting the treatment that could save your life.   [employer] last reviewed this educational content on 4/1/2019 2000-2020 [employer]. [address] [place] [address], [place] [address]. All rights reserved. This information is not intended as a substitute for professional medical care. Always follow your healthcare professional's instructions.

## 2025-07-16 NOTE — LETTER
7/16/2025      Erik Youngblood  [address]  [place] [address]      Dear Colleague,    Thank you for referring your patient, Erik Youngblood, to the [facility]. Please see a copy of my visit note below.    [facility] Dermatology Note  Encounter Date: Jul 16, 2025  Office Visit      Dermatology Problem List:  FBSE: 7/16/25    1. History of DN -  mild atypia, s/p excision 11/1/18  2. ISK, s/p cryo  Social: Lives in [place], [place] 2 to 3 months out of the year.  [place]  Wears [employer] clothing.  Wife is a nurse practitioner.  ____________________________________________    Assessment & Plan:  # Lesion to monitor -consistent with an SK-hyperpigmented thin papule on the right upper chest, 6mm  - Removed from lesion to monitoring since confirmed today lesion is an SK     # Hx of DN  - Signs and Symptoms of non-melanoma skin cancer and ABCDEs of melanoma reviewed with patient. Patient encouraged to perform monthly self skin exams and educated on how to perform them. UV precautions reviewed with patient. Patient was asked about new or changing moles/lesions on body.   - Sunscreen: Apply 20 minutes prior to going outdoors and reapply every two hours, when wet or sweating. We recommend using an SPF 30 or higher, and to use one that is water resistant.     - continue annual skin exams    # Benign findings: multiple benign nevi, lentigines, cherry angiomas, SKs  - edu on benign etiology  - Signs and Symptoms of non-melanoma skin cancer and ABCDEs of melanoma reviewed with patient. Patient encouraged to perform monthly self skin exams and educated on how to perform them. UV precautions reviewed with patient. Patient was asked about new or changing moles/lesions on body.   - Sunscreen: Apply 20 minutes prior to going outdoors and reapply every two hours, when wet or sweating. We recommend using an SPF 30 or higher, and to use one that is water  resistant.     - RTC for changes    Procedures Performed:   None    Follow-up: 1 year(s) in-person, or earlier for new or changing lesions    Staff and scribe:    Scribe Disclosure:   I, KAYLEEN KENNEY, am serving as a scribe; to document services personally performed by Addie Byers PA-C -based on data collection and the provider's statements to me.     Provider Disclosure:  I agree with above History, Review of Systems, Physical exam and Plan.  I have reviewed the content of the documentation and have edited it as needed. I have personally performed the services documented here and the documentation accurately represents those services and the decisions I have made.      Electronically signed by:    All risks, benefits and alternatives were discussed with patient.  Patient is in agreement and understands the assessment and plan.  All questions were answered.    Addie Byers PA-C, [facility]  [facility]: Phone: [phone], Fax: [phone]  [facility]: Phone: [phone],  Fax: [phone]  [facility]: Phone: [phone], Fax: [phone]  ____________________________________________    CC: No chief complaint on file.      Reviewed patients past medical history and pertinent chart review prior to patient's visit today.     HPI:  Mr. Erik Youngblood is a 65 year old male who [place] today [place] [facility]     Today patient did not report any spots concerns. He denies any painful, bleeding, or not healing lesions. He does not have any new changes to his health.     We have been monitoring a spot on the R chest. Pataient does not think it has changed.    Denied any spots of concern.     Patient is otherwise feeling well, without additional concerns.    Labs:  N/A    Physical Exam:  Vitals: There were no vitals taken for this visit.  SKIN: Total skin excluding the undergarment areas was  performed. The exam included the head/face, neck, both arms, chest, back, abdomen, both legs, digits and/or nails.    - - Shah's skin type II, has <100 nevi  - There are dome shaped bright red papules on the trunk.   - Multiple regular brown pigmented macules and papules are identified on the trunk and extremities.   - Scattered brown macules on sun exposed areas.  - There are waxy stuck on tan to brown papules on the trunk.   - Hyperpigmented thin papule on the right upper chest. Measuring 6 mm, today appears more waxy and is consistent with a benign SK  - No other lesions of concern on areas examined.     Medications:  Current Outpatient Medications   Medication Sig Dispense Refill     amLODIPine (NORVASC) 10 MG tablet Take 1 tablet (10 mg) by mouth daily. 90 tablet 3     EPINEPHrine (ANY BX GENERIC EQUIV) 0.3 MG/0.3ML injection 2-pack Inject 0.3 mLs (0.3 mg) into the muscle as needed for anaphylaxis. May repeat one time in 5-15 minutes if response to initial dose is inadequate. 2 each 1     hydrochlorothiazide (HYDRODIURIL) 25 MG tablet TAKE ONE TABLET BY MOUTH ONCE DAILY 90 tablet 3     methylcellulose (CITRUCEL) powder Take by mouth daily       rosuvastatin (CRESTOR) 10 MG tablet Take 1 tablet (10 mg) by mouth daily. 90 tablet 3     vardenafil (LEVITRA) 20 MG tablet Take 1 tablet (20 mg) by mouth daily as needed (for erections). 6 tablet 11     No current facility-administered medications for this visit.      Past Medical/Surgical History:   Patient Active Problem List   Diagnosis     Overweight (BMI 25.0-29.9)     Essential hypertension with goal blood pressure less than 140/90     Hyperlipidemia LDL goal <130     Prostate cancer (H)     Status post prostatectomy     History of adenomatous polyp of colon     Advance care planning     Spinal stenosis     Trigger finger, right middle finger     Erectile dysfunction, unspecified erectile dysfunction type     Past Medical History:   Diagnosis Date      Adenomatous colon polyp      Allergic conjunctivitis      Cancer (H) 12/22/2014    Prostate Cancer / Removed     Complication of anesthesia     nausea     Hypercholesterolemia      Hypertension      Obesity, Class I, BMI 30-34.9 05/19/2016     Spinal stenosis      Trigger finger, right middle finger                       Again, thank you for allowing me to participate in the care of your patient.        Sincerely,        Addie Byers PA-C    Electronically signed

## 2025-07-16 NOTE — PROGRESS NOTES
[facility] Dermatology Note  Encounter Date: Jul 16, 2025  Office Visit      Dermatology Problem List:  FBSE: 7/16/25    1. History of DN -  mild atypia, s/p excision 11/1/18  2. ISK, s/p cryo  Social: Lives in [place], [place] 2 to 3 months out of the year.  [place]  Wears [employer] clothing.  Wife is a nurse practitioner.  ____________________________________________    Assessment & Plan:  # Lesion to monitor -consistent with an SK-hyperpigmented thin papule on the right upper chest, 6mm  - Removed from lesion to monitoring since confirmed today lesion is an SK     # Hx of DN  - Signs and Symptoms of non-melanoma skin cancer and ABCDEs of melanoma reviewed with patient. Patient encouraged to perform monthly self skin exams and educated on how to perform them. UV precautions reviewed with patient. Patient was asked about new or changing moles/lesions on body.   - Sunscreen: Apply 20 minutes prior to going outdoors and reapply every two hours, when wet or sweating. We recommend using an SPF 30 or higher, and to use one that is water resistant.     - continue annual skin exams    # Benign findings: multiple benign nevi, lentigines, cherry angiomas, SKs  - edu on benign etiology  - Signs and Symptoms of non-melanoma skin cancer and ABCDEs of melanoma reviewed with patient. Patient encouraged to perform monthly self skin exams and educated on how to perform them. UV precautions reviewed with patient. Patient was asked about new or changing moles/lesions on body.   - Sunscreen: Apply 20 minutes prior to going outdoors and reapply every two hours, when wet or sweating. We recommend using an SPF 30 or higher, and to use one that is water resistant.     - RTC for changes    Procedures Performed:   None    Follow-up: 1 year(s) in-person, or earlier for new or changing lesions    Staff and scribe:    Scribe Disclosure:   KAYLEEN PERRY, am serving as a scribe; to document  services personally performed by Addie Byers PA-C -based on data collection and the provider's statements to me.     Provider Disclosure:  I agree with above History, Review of Systems, Physical exam and Plan.  I have reviewed the content of the documentation and have edited it as needed. I have personally performed the services documented here and the documentation accurately represents those services and the decisions I have made.      Electronically signed by:    All risks, benefits and alternatives were discussed with patient.  Patient is in agreement and understands the assessment and plan.  All questions were answered.    Addie Byers PA-C, MPAS  [facility] [place]: Phone: [phone], Fax: [phone]  [facility]: Phone: [phone],  Fax: [phone]  [facility]: Phone: [phone], Fax: [phone]  ____________________________________________    CC: No chief complaint on file.      Reviewed patients past medical history and pertinent chart review prior to patient's visit today.     HPI:  Mr. Erik Youngblood is a 65 year old male who presents today as a return patient for [facility].     Today patient did not report any spots concerns. He denies any painful, bleeding, or not healing lesions. He does not have any new changes to his health.     We have been monitoring a spot on the R chest. Pataient does not think it has changed.    Denied any spots of concern.     Patient is otherwise feeling well, without additional concerns.    Labs:  N/A    Physical Exam:  Vitals: There were no vitals taken for this visit.  SKIN: Total skin excluding the undergarment areas was performed. The exam included the head/face, neck, both arms, chest, back, abdomen, both legs, digits and/or nails.    - - Shah's skin type II, has <100 nevi  - There are dome shaped bright red papules on the trunk.   - Multiple  regular brown pigmented macules and papules are identified on the trunk and extremities.   - Scattered brown macules on sun exposed areas.  - There are waxy stuck on tan to brown papules on the trunk.   - Hyperpigmented thin papule on the right upper chest. Measuring 6 mm, today appears more waxy and is consistent with a benign SK  - No other lesions of concern on areas examined.     Medications:  Current Outpatient Medications   Medication Sig Dispense Refill    amLODIPine (NORVASC) 10 MG tablet Take 1 tablet (10 mg) by mouth daily. 90 tablet 3    EPINEPHrine (ANY BX GENERIC EQUIV) 0.3 MG/0.3ML injection 2-pack Inject 0.3 mLs (0.3 mg) into the muscle as needed for anaphylaxis. May repeat one time in 5-15 minutes if response to initial dose is inadequate. 2 each 1    hydrochlorothiazide (HYDRODIURIL) 25 MG tablet TAKE ONE TABLET BY MOUTH ONCE DAILY 90 tablet 3    methylcellulose (CITRUCEL) powder Take by mouth daily      rosuvastatin (CRESTOR) 10 MG tablet Take 1 tablet (10 mg) by mouth daily. 90 tablet 3    vardenafil (LEVITRA) 20 MG tablet Take 1 tablet (20 mg) by mouth daily as needed (for erections). 6 tablet 11     No current facility-administered medications for this visit.      Past Medical/Surgical History:   Patient Active Problem List   Diagnosis    Overweight (BMI 25.0-29.9)    Essential hypertension with goal blood pressure less than 140/90    Hyperlipidemia LDL goal <130    Prostate cancer (H)    Status post prostatectomy    History of adenomatous polyp of colon    Advance care planning    Spinal stenosis    Trigger finger, right middle finger    Erectile dysfunction, unspecified erectile dysfunction type     Past Medical History:   Diagnosis Date    Adenomatous colon polyp     Allergic conjunctivitis     Cancer (H) 12/22/2014    Prostate Cancer / Removed    Complication of anesthesia     nausea    Hypercholesterolemia     Hypertension     Obesity, Class I, BMI 30-34.9 05/19/2016    Spinal stenosis      Trigger finger, right middle finger

## 2025-07-16 NOTE — NURSING NOTE
Erik Youngblood's chief complaint for this visit includes:  Chief Complaint   Patient presents with    Skin Check     Annual skin check; no areas of concerns. Hx of DN     PCP: Fauzia Yepez    Referring Provider:  Referred Self, MD  No address on file    There were no vitals taken for this visit.  No Pain (0)        Allergies   Allergen Reactions    Bee Venom Shortness Of Breath and Swelling    Lisinopril Hives, Swelling and Other (See Comments)     Angioedema?    Penicillins Hives and Swelling       Do you need any medication refills at today's visit? No    Carley Valenzuela MA on 7/16/2025 at 10:35 AM

## (undated) DEVICE — BLADE KNIFE SURG 15 371115

## (undated) DEVICE — GLOVE PROTEXIS W/NEU-THERA 8.0  2D73TE80

## (undated) DEVICE — PREP CHLORAPREP 26ML TINTED ORANGE  260815

## (undated) DEVICE — SOL WATER IRRIG 1000ML BOTTLE 07139-09

## (undated) DEVICE — GLOVE PROTEXIS W/NEU-THERA 7.5  2D73TE75

## (undated) DEVICE — BLADE KNIFE SURG 11 371111

## (undated) DEVICE — DRAPE STERI TOWEL SM 1000

## (undated) DEVICE — NDL 19GA 1.5"

## (undated) DEVICE — BNDG KLING 2" 2231

## (undated) DEVICE — SU ETHILON 5-0 P-3 18" BLACK 698G

## (undated) DEVICE — ESU ELEC NDL 1" COATED/INSULATED E1465

## (undated) DEVICE — PACK HAND WRIST SOP15HWFSP

## (undated) DEVICE — ESU GROUND PAD ADULT W/CORD E7507

## (undated) RX ORDER — FENTANYL CITRATE 50 UG/ML
INJECTION, SOLUTION INTRAMUSCULAR; INTRAVENOUS
Status: DISPENSED
Start: 2021-11-22